# Patient Record
Sex: MALE | Race: WHITE | NOT HISPANIC OR LATINO | Employment: UNEMPLOYED | URBAN - METROPOLITAN AREA
[De-identification: names, ages, dates, MRNs, and addresses within clinical notes are randomized per-mention and may not be internally consistent; named-entity substitution may affect disease eponyms.]

---

## 2017-01-01 ENCOUNTER — APPOINTMENT (OUTPATIENT)
Dept: GENERAL RADIOLOGY | Facility: HOSPITAL | Age: 67
End: 2017-01-01

## 2017-01-01 ENCOUNTER — APPOINTMENT (OUTPATIENT)
Dept: CT IMAGING | Facility: HOSPITAL | Age: 67
End: 2017-01-01

## 2017-01-01 ENCOUNTER — HOSPITAL ENCOUNTER (INPATIENT)
Facility: HOSPITAL | Age: 67
LOS: 10 days | End: 2017-10-18
Attending: EMERGENCY MEDICINE | Admitting: INTERNAL MEDICINE

## 2017-01-01 ENCOUNTER — APPOINTMENT (OUTPATIENT)
Dept: CARDIOLOGY | Facility: HOSPITAL | Age: 67
End: 2017-01-01
Attending: HOSPITALIST

## 2017-01-01 VITALS
HEIGHT: 64 IN | RESPIRATION RATE: 20 BRPM | OXYGEN SATURATION: 86 % | DIASTOLIC BLOOD PRESSURE: 58 MMHG | TEMPERATURE: 98.1 F | SYSTOLIC BLOOD PRESSURE: 135 MMHG | HEART RATE: 120 BPM | BODY MASS INDEX: 53.78 KG/M2 | WEIGHT: 315 LBS

## 2017-01-01 DIAGNOSIS — A41.9 SEPSIS, DUE TO UNSPECIFIED ORGANISM: ICD-10-CM

## 2017-01-01 DIAGNOSIS — Z74.09 IMPAIRED FUNCTIONAL MOBILITY, BALANCE, GAIT, AND ENDURANCE: ICD-10-CM

## 2017-01-01 DIAGNOSIS — E87.20 LACTIC ACIDOSIS: ICD-10-CM

## 2017-01-01 DIAGNOSIS — R13.10 DYSPHAGIA, UNSPECIFIED TYPE: Primary | ICD-10-CM

## 2017-01-01 LAB
ALBUMIN SERPL-MCNC: 2.4 G/DL (ref 3.2–4.8)
ALBUMIN SERPL-MCNC: 2.5 G/DL (ref 3.2–4.8)
ALBUMIN SERPL-MCNC: 2.6 G/DL (ref 3.2–4.8)
ALBUMIN SERPL-MCNC: 2.7 G/DL (ref 3.2–4.8)
ALBUMIN SERPL-MCNC: 2.8 G/DL (ref 3.2–4.8)
ALBUMIN SERPL-MCNC: 3 G/DL (ref 3.2–4.8)
ALBUMIN SERPL-MCNC: 3.1 G/DL (ref 3.2–4.8)
ALBUMIN/GLOB SERPL: 0.8 G/DL (ref 1.5–2.5)
ALBUMIN/GLOB SERPL: 0.9 G/DL (ref 1.5–2.5)
ALBUMIN/GLOB SERPL: 0.9 G/DL (ref 1.5–2.5)
ALP SERPL-CCNC: 142 U/L (ref 25–100)
ALP SERPL-CCNC: 160 U/L (ref 25–100)
ALP SERPL-CCNC: 162 U/L (ref 25–100)
ALP SERPL-CCNC: 176 U/L (ref 25–100)
ALP SERPL-CCNC: 181 U/L (ref 25–100)
ALP SERPL-CCNC: 188 U/L (ref 25–100)
ALP SERPL-CCNC: 249 U/L (ref 25–100)
ALP SERPL-CCNC: 254 U/L (ref 25–100)
ALP SERPL-CCNC: 254 U/L (ref 25–100)
ALP SERPL-CCNC: 271 U/L (ref 25–100)
ALP SERPL-CCNC: 292 U/L (ref 25–100)
ALT SERPL W P-5'-P-CCNC: 32 U/L (ref 7–40)
ALT SERPL W P-5'-P-CCNC: 32 U/L (ref 7–40)
ALT SERPL W P-5'-P-CCNC: 33 U/L (ref 7–40)
ALT SERPL W P-5'-P-CCNC: 34 U/L (ref 7–40)
ALT SERPL W P-5'-P-CCNC: 34 U/L (ref 7–40)
ALT SERPL W P-5'-P-CCNC: 35 U/L (ref 7–40)
ALT SERPL W P-5'-P-CCNC: 60 U/L (ref 7–40)
ALT SERPL W P-5'-P-CCNC: 68 U/L (ref 7–40)
ALT SERPL W P-5'-P-CCNC: 68 U/L (ref 7–40)
ALT SERPL W P-5'-P-CCNC: 80 U/L (ref 7–40)
ALT SERPL W P-5'-P-CCNC: 91 U/L (ref 7–40)
AMMONIA BLD-SCNC: 115 UMOL/L (ref 19–60)
AMMONIA BLD-SCNC: 141 UMOL/L (ref 19–60)
AMMONIA BLD-SCNC: 31 UMOL/L (ref 19–60)
AMMONIA BLD-SCNC: 38 UMOL/L (ref 19–60)
AMMONIA BLD-SCNC: 47 UMOL/L (ref 19–60)
AMMONIA BLD-SCNC: 64 UMOL/L (ref 19–60)
AMPHET+METHAMPHET UR QL: NEGATIVE
AMPHETAMINES UR QL: NEGATIVE
ANION GAP SERPL CALCULATED.3IONS-SCNC: 1 MMOL/L (ref 3–11)
ANION GAP SERPL CALCULATED.3IONS-SCNC: 12 MMOL/L (ref 3–11)
ANION GAP SERPL CALCULATED.3IONS-SCNC: 13 MMOL/L (ref 3–11)
ANION GAP SERPL CALCULATED.3IONS-SCNC: 2 MMOL/L (ref 3–11)
ANION GAP SERPL CALCULATED.3IONS-SCNC: 20 MMOL/L (ref 3–11)
ANION GAP SERPL CALCULATED.3IONS-SCNC: 3 MMOL/L (ref 3–11)
ANION GAP SERPL CALCULATED.3IONS-SCNC: 4 MMOL/L (ref 3–11)
ANION GAP SERPL CALCULATED.3IONS-SCNC: 4 MMOL/L (ref 3–11)
ANION GAP SERPL CALCULATED.3IONS-SCNC: 5 MMOL/L (ref 3–11)
ANION GAP SERPL CALCULATED.3IONS-SCNC: 5 MMOL/L (ref 3–11)
ANION GAP SERPL CALCULATED.3IONS-SCNC: 6 MMOL/L (ref 3–11)
ANION GAP SERPL CALCULATED.3IONS-SCNC: 6 MMOL/L (ref 3–11)
ANION GAP SERPL CALCULATED.3IONS-SCNC: 7 MMOL/L (ref 3–11)
ARTERIAL PATENCY WRIST A: ABNORMAL
ARTERIAL PATENCY WRIST A: ABNORMAL
AST SERPL-CCNC: 112 U/L (ref 0–33)
AST SERPL-CCNC: 121 U/L (ref 0–33)
AST SERPL-CCNC: 127 U/L (ref 0–33)
AST SERPL-CCNC: 127 U/L (ref 0–33)
AST SERPL-CCNC: 143 U/L (ref 0–33)
AST SERPL-CCNC: 144 U/L (ref 0–33)
AST SERPL-CCNC: 186 U/L (ref 0–33)
AST SERPL-CCNC: 202 U/L (ref 0–33)
AST SERPL-CCNC: 202 U/L (ref 0–33)
AST SERPL-CCNC: 219 U/L (ref 0–33)
AST SERPL-CCNC: 228 U/L (ref 0–33)
ATMOSPHERIC PRESS: ABNORMAL MMHG
ATMOSPHERIC PRESS: ABNORMAL MMHG
BACTERIA SPEC AEROBE CULT: ABNORMAL
BACTERIA SPEC AEROBE CULT: ABNORMAL
BACTERIA SPEC AEROBE CULT: NORMAL
BACTERIA UR QL AUTO: ABNORMAL /HPF
BACTERIA UR QL AUTO: ABNORMAL /HPF
BARBITURATES UR QL SCN: NEGATIVE
BASE EXCESS BLDA CALC-SCNC: 0.9 MMOL/L (ref 0–2)
BASE EXCESS BLDA CALC-SCNC: 7.7 MMOL/L (ref 0–2)
BASOPHILS # BLD AUTO: 0.01 10*3/MM3 (ref 0–0.2)
BASOPHILS # BLD AUTO: 0.08 10*3/MM3 (ref 0–0.2)
BASOPHILS # BLD AUTO: 0.17 10*3/MM3 (ref 0–0.2)
BASOPHILS NFR BLD AUTO: 0.1 % (ref 0–1)
BASOPHILS NFR BLD AUTO: 0.6 % (ref 0–1)
BASOPHILS NFR BLD AUTO: 0.7 % (ref 0–1)
BDY SITE: ABNORMAL
BDY SITE: ABNORMAL
BENZODIAZ UR QL SCN: NEGATIVE
BH CV ECHO MEAS - AO ROOT AREA (BSA CORRECTED): 1
BH CV ECHO MEAS - AO ROOT AREA: 5 CM^2
BH CV ECHO MEAS - AO ROOT DIAM: 2.5 CM
BH CV ECHO MEAS - BSA(HAYCOCK): 2.7 M^2
BH CV ECHO MEAS - BSA: 2.4 M^2
BH CV ECHO MEAS - BZI_BMI: 56.8 KILOGRAMS/M^2
BH CV ECHO MEAS - BZI_METRIC_HEIGHT: 162.6 CM
BH CV ECHO MEAS - BZI_METRIC_WEIGHT: 150.1 KG
BH CV ECHO MEAS - CONTRAST EF (2CH): 51.3 ML/M^2
BH CV ECHO MEAS - CONTRAST EF 4CH: 65.3 ML/M^2
BH CV ECHO MEAS - EDV(CUBED): 205.5 ML
BH CV ECHO MEAS - EDV(MOD-SP2): 156 ML
BH CV ECHO MEAS - EDV(MOD-SP4): 199 ML
BH CV ECHO MEAS - EDV(TEICH): 173.3 ML
BH CV ECHO MEAS - EF(CUBED): 67 %
BH CV ECHO MEAS - EF(MOD-SP2): 51.3 %
BH CV ECHO MEAS - EF(MOD-SP4): 65.3 %
BH CV ECHO MEAS - EF(TEICH): 57.8 %
BH CV ECHO MEAS - ESV(CUBED): 67.7 ML
BH CV ECHO MEAS - ESV(MOD-SP2): 76 ML
BH CV ECHO MEAS - ESV(MOD-SP4): 69 ML
BH CV ECHO MEAS - ESV(TEICH): 73.2 ML
BH CV ECHO MEAS - FS: 30.9 %
BH CV ECHO MEAS - IVS/LVPW: 0.98
BH CV ECHO MEAS - IVSD: 1.3 CM
BH CV ECHO MEAS - LA DIMENSION: 3.9 CM
BH CV ECHO MEAS - LA/AO: 1.5
BH CV ECHO MEAS - LV DIASTOLIC VOL/BSA (35-75): 82.1 ML/M^2
BH CV ECHO MEAS - LV MASS(C)D: 356.7 GRAMS
BH CV ECHO MEAS - LV MASS(C)DI: 147.2 GRAMS/M^2
BH CV ECHO MEAS - LV SYSTOLIC VOL/BSA (12-30): 28.5 ML/M^2
BH CV ECHO MEAS - LVIDD: 5.9 CM
BH CV ECHO MEAS - LVIDS: 4.1 CM
BH CV ECHO MEAS - LVLD AP2: 8.3 CM
BH CV ECHO MEAS - LVLD AP4: 9.2 CM
BH CV ECHO MEAS - LVLS AP2: 6.5 CM
BH CV ECHO MEAS - LVLS AP4: 7.5 CM
BH CV ECHO MEAS - LVPWD: 1.4 CM
BH CV ECHO MEAS - SI(CUBED): 56.8 ML/M^2
BH CV ECHO MEAS - SI(MOD-SP2): 33 ML/M^2
BH CV ECHO MEAS - SI(MOD-SP4): 53.7 ML/M^2
BH CV ECHO MEAS - SI(TEICH): 41.3 ML/M^2
BH CV ECHO MEAS - SV(CUBED): 137.7 ML
BH CV ECHO MEAS - SV(MOD-SP2): 80 ML
BH CV ECHO MEAS - SV(MOD-SP4): 130 ML
BH CV ECHO MEAS - SV(TEICH): 100.1 ML
BILIRUB CONJ SERPL-MCNC: 2.1 MG/DL (ref 0–0.2)
BILIRUB CONJ SERPL-MCNC: 2.3 MG/DL (ref 0–0.2)
BILIRUB CONJ SERPL-MCNC: 2.3 MG/DL (ref 0–0.2)
BILIRUB INDIRECT SERPL-MCNC: 0.7 MG/DL (ref 0.1–1.1)
BILIRUB SERPL-MCNC: 2.8 MG/DL (ref 0.3–1.2)
BILIRUB SERPL-MCNC: 3 MG/DL (ref 0.3–1.2)
BILIRUB SERPL-MCNC: 3.5 MG/DL (ref 0.3–1.2)
BILIRUB SERPL-MCNC: 3.5 MG/DL (ref 0.3–1.2)
BILIRUB SERPL-MCNC: 3.8 MG/DL (ref 0.3–1.2)
BILIRUB SERPL-MCNC: 5.4 MG/DL (ref 0.3–1.2)
BILIRUB SERPL-MCNC: 5.6 MG/DL (ref 0.3–1.2)
BILIRUB SERPL-MCNC: 6 MG/DL (ref 0.3–1.2)
BILIRUB SERPL-MCNC: 7.2 MG/DL (ref 0.3–1.2)
BILIRUB UR QL STRIP: ABNORMAL
BILIRUB UR QL STRIP: ABNORMAL
BUN BLD-MCNC: 11 MG/DL (ref 9–23)
BUN BLD-MCNC: 12 MG/DL (ref 9–23)
BUN BLD-MCNC: 12 MG/DL (ref 9–23)
BUN BLD-MCNC: 13 MG/DL (ref 9–23)
BUN BLD-MCNC: 14 MG/DL (ref 9–23)
BUN BLD-MCNC: 23 MG/DL (ref 9–23)
BUN BLD-MCNC: 26 MG/DL (ref 9–23)
BUN BLD-MCNC: 27 MG/DL (ref 9–23)
BUN BLD-MCNC: 41 MG/DL (ref 9–23)
BUN BLD-MCNC: 43 MG/DL (ref 9–23)
BUN BLD-MCNC: 45 MG/DL (ref 9–23)
BUN/CREAT SERPL: 17.1 (ref 7–25)
BUN/CREAT SERPL: 20 (ref 7–25)
BUN/CREAT SERPL: 21.7 (ref 7–25)
BUN/CREAT SERPL: 23.3 (ref 7–25)
BUN/CREAT SERPL: 27.5 (ref 7–25)
BUN/CREAT SERPL: 28 (ref 7–25)
BUN/CREAT SERPL: 41 (ref 7–25)
BUN/CREAT SERPL: 43.3 (ref 7–25)
BUN/CREAT SERPL: 46 (ref 7–25)
BUN/CREAT SERPL: 56.3 (ref 7–25)
BUN/CREAT SERPL: 61.4 (ref 7–25)
BUPRENORPHINE SERPL-MCNC: NEGATIVE NG/ML
CA-I SERPL ISE-MCNC: 1.08 MMOL/L (ref 1.12–1.32)
CA-I SERPL ISE-MCNC: 1.2 MMOL/L (ref 1.12–1.32)
CALCIUM SPEC-SCNC: 7.9 MG/DL (ref 8.7–10.4)
CALCIUM SPEC-SCNC: 8.1 MG/DL (ref 8.7–10.4)
CALCIUM SPEC-SCNC: 8.2 MG/DL (ref 8.7–10.4)
CALCIUM SPEC-SCNC: 8.3 MG/DL (ref 8.7–10.4)
CALCIUM SPEC-SCNC: 8.4 MG/DL (ref 8.7–10.4)
CALCIUM SPEC-SCNC: 8.4 MG/DL (ref 8.7–10.4)
CALCIUM SPEC-SCNC: 8.5 MG/DL (ref 8.7–10.4)
CALCIUM SPEC-SCNC: 8.7 MG/DL (ref 8.7–10.4)
CALCIUM SPEC-SCNC: 8.8 MG/DL (ref 8.7–10.4)
CALCIUM SPEC-SCNC: 8.9 MG/DL (ref 8.7–10.4)
CALCIUM SPEC-SCNC: 9.2 MG/DL (ref 8.7–10.4)
CANNABINOIDS SERPL QL: NEGATIVE
CHLORIDE SERPL-SCNC: 103 MMOL/L (ref 99–109)
CHLORIDE SERPL-SCNC: 103 MMOL/L (ref 99–109)
CHLORIDE SERPL-SCNC: 104 MMOL/L (ref 99–109)
CHLORIDE SERPL-SCNC: 105 MMOL/L (ref 99–109)
CHLORIDE SERPL-SCNC: 107 MMOL/L (ref 99–109)
CHLORIDE SERPL-SCNC: 108 MMOL/L (ref 99–109)
CHLORIDE SERPL-SCNC: 84 MMOL/L (ref 99–109)
CHLORIDE SERPL-SCNC: 94 MMOL/L (ref 99–109)
CHLORIDE SERPL-SCNC: 97 MMOL/L (ref 99–109)
CHOLEST SERPL-MCNC: 72 MG/DL (ref 0–200)
CHOLEST SERPL-MCNC: 81 MG/DL (ref 0–200)
CK SERPL-CCNC: 259 U/L (ref 26–174)
CLARITY UR: ABNORMAL
CLARITY UR: ABNORMAL
CO2 BLDA-SCNC: 26.1 MMOL/L (ref 22–33)
CO2 BLDA-SCNC: 34.6 MMOL/L (ref 22–33)
CO2 SERPL-SCNC: 24 MMOL/L (ref 20–31)
CO2 SERPL-SCNC: 28 MMOL/L (ref 20–31)
CO2 SERPL-SCNC: 29 MMOL/L (ref 20–31)
CO2 SERPL-SCNC: 31 MMOL/L (ref 20–31)
CO2 SERPL-SCNC: 35 MMOL/L (ref 20–31)
CO2 SERPL-SCNC: 35 MMOL/L (ref 20–31)
CO2 SERPL-SCNC: 36 MMOL/L (ref 20–31)
CO2 SERPL-SCNC: 37 MMOL/L (ref 20–31)
CO2 SERPL-SCNC: 37 MMOL/L (ref 20–31)
CO2 SERPL-SCNC: 38 MMOL/L (ref 20–31)
CO2 SERPL-SCNC: 38 MMOL/L (ref 20–31)
CO2 SERPL-SCNC: 39 MMOL/L (ref 20–31)
COCAINE UR QL: NEGATIVE
COHGB MFR BLD: 1.4 % (ref 0–2)
COHGB MFR BLD: 1.7 % (ref 0–2)
COLOR UR: ABNORMAL
COLOR UR: ABNORMAL
CREAT BLD-MCNC: 0.4 MG/DL (ref 0.6–1.3)
CREAT BLD-MCNC: 0.5 MG/DL (ref 0.6–1.3)
CREAT BLD-MCNC: 0.5 MG/DL (ref 0.6–1.3)
CREAT BLD-MCNC: 0.6 MG/DL (ref 0.6–1.3)
CREAT BLD-MCNC: 0.7 MG/DL (ref 0.6–1.3)
CREAT BLD-MCNC: 0.8 MG/DL (ref 0.6–1.3)
CREAT BLD-MCNC: 1 MG/DL (ref 0.6–1.3)
CREAT UR-MCNC: 167.4 MG/DL
CRP SERPL-MCNC: 14.83 MG/DL (ref 0–1)
CRP SERPL-MCNC: 15.29 MG/DL (ref 0–1)
D-LACTATE SERPL-SCNC: 1.1 MMOL/L (ref 0.5–2)
D-LACTATE SERPL-SCNC: 1.5 MMOL/L (ref 0.5–2)
D-LACTATE SERPL-SCNC: 10.6 MMOL/L (ref 0.5–2)
D-LACTATE SERPL-SCNC: 3.4 MMOL/L (ref 0.5–2)
DEPRECATED RDW RBC AUTO: 56.9 FL (ref 37–54)
DEPRECATED RDW RBC AUTO: 57.9 FL (ref 37–54)
DEPRECATED RDW RBC AUTO: 59 FL (ref 37–54)
DEPRECATED RDW RBC AUTO: 60.9 FL (ref 37–54)
DEPRECATED RDW RBC AUTO: 66.3 FL (ref 37–54)
DEPRECATED RDW RBC AUTO: 67 FL (ref 37–54)
DEPRECATED RDW RBC AUTO: 67.4 FL (ref 37–54)
DEPRECATED RDW RBC AUTO: 68.2 FL (ref 37–54)
DEPRECATED RDW RBC AUTO: 68.5 FL (ref 37–54)
EOSINOPHIL # BLD AUTO: 0.03 10*3/MM3 (ref 0–0.3)
EOSINOPHIL # BLD AUTO: 0.24 10*3/MM3 (ref 0–0.3)
EOSINOPHIL # BLD AUTO: 0.42 10*3/MM3 (ref 0–0.3)
EOSINOPHIL NFR BLD AUTO: 0.3 % (ref 0–3)
EOSINOPHIL NFR BLD AUTO: 1.8 % (ref 0–3)
EOSINOPHIL NFR BLD AUTO: 1.8 % (ref 0–3)
ERYTHROCYTE [DISTWIDTH] IN BLOOD BY AUTOMATED COUNT: 15.6 % (ref 11.3–14.5)
ERYTHROCYTE [DISTWIDTH] IN BLOOD BY AUTOMATED COUNT: 15.7 % (ref 11.3–14.5)
ERYTHROCYTE [DISTWIDTH] IN BLOOD BY AUTOMATED COUNT: 15.8 % (ref 11.3–14.5)
ERYTHROCYTE [DISTWIDTH] IN BLOOD BY AUTOMATED COUNT: 16.1 % (ref 11.3–14.5)
ERYTHROCYTE [DISTWIDTH] IN BLOOD BY AUTOMATED COUNT: 16.6 % (ref 11.3–14.5)
ERYTHROCYTE [DISTWIDTH] IN BLOOD BY AUTOMATED COUNT: 16.8 % (ref 11.3–14.5)
ERYTHROCYTE [DISTWIDTH] IN BLOOD BY AUTOMATED COUNT: 16.8 % (ref 11.3–14.5)
ERYTHROCYTE [DISTWIDTH] IN BLOOD BY AUTOMATED COUNT: 17.1 % (ref 11.3–14.5)
ERYTHROCYTE [DISTWIDTH] IN BLOOD BY AUTOMATED COUNT: 17.2 % (ref 11.3–14.5)
GFR SERPL CREATININE-BSD FRML MDRD: 113 ML/MIN/1.73
GFR SERPL CREATININE-BSD FRML MDRD: 113 ML/MIN/1.73
GFR SERPL CREATININE-BSD FRML MDRD: 135 ML/MIN/1.73
GFR SERPL CREATININE-BSD FRML MDRD: 75 ML/MIN/1.73
GFR SERPL CREATININE-BSD FRML MDRD: 97 ML/MIN/1.73
GFR SERPL CREATININE-BSD FRML MDRD: >150 ML/MIN/1.73
GLOBULIN UR ELPH-MCNC: 2.9 GM/DL
GLOBULIN UR ELPH-MCNC: 2.9 GM/DL
GLOBULIN UR ELPH-MCNC: 3.1 GM/DL
GLOBULIN UR ELPH-MCNC: 3.2 GM/DL
GLOBULIN UR ELPH-MCNC: 3.2 GM/DL
GLOBULIN UR ELPH-MCNC: 3.5 GM/DL
GLUCOSE BLD-MCNC: 107 MG/DL (ref 70–100)
GLUCOSE BLD-MCNC: 109 MG/DL (ref 70–100)
GLUCOSE BLD-MCNC: 114 MG/DL (ref 70–100)
GLUCOSE BLD-MCNC: 118 MG/DL (ref 70–100)
GLUCOSE BLD-MCNC: 126 MG/DL (ref 70–100)
GLUCOSE BLD-MCNC: 135 MG/DL (ref 70–100)
GLUCOSE BLD-MCNC: 138 MG/DL (ref 70–100)
GLUCOSE BLD-MCNC: 141 MG/DL (ref 70–100)
GLUCOSE BLD-MCNC: 144 MG/DL (ref 70–100)
GLUCOSE BLD-MCNC: 145 MG/DL (ref 70–100)
GLUCOSE BLD-MCNC: 145 MG/DL (ref 70–100)
GLUCOSE BLD-MCNC: 149 MG/DL (ref 70–100)
GLUCOSE BLD-MCNC: 175 MG/DL (ref 70–100)
GLUCOSE BLDC GLUCOMTR-MCNC: 102 MG/DL (ref 70–130)
GLUCOSE BLDC GLUCOMTR-MCNC: 107 MG/DL (ref 70–130)
GLUCOSE BLDC GLUCOMTR-MCNC: 108 MG/DL (ref 70–130)
GLUCOSE BLDC GLUCOMTR-MCNC: 111 MG/DL (ref 70–130)
GLUCOSE BLDC GLUCOMTR-MCNC: 114 MG/DL (ref 70–130)
GLUCOSE BLDC GLUCOMTR-MCNC: 115 MG/DL (ref 70–130)
GLUCOSE BLDC GLUCOMTR-MCNC: 116 MG/DL (ref 70–130)
GLUCOSE BLDC GLUCOMTR-MCNC: 117 MG/DL (ref 70–130)
GLUCOSE BLDC GLUCOMTR-MCNC: 118 MG/DL (ref 70–130)
GLUCOSE BLDC GLUCOMTR-MCNC: 124 MG/DL (ref 70–130)
GLUCOSE BLDC GLUCOMTR-MCNC: 125 MG/DL (ref 70–130)
GLUCOSE BLDC GLUCOMTR-MCNC: 125 MG/DL (ref 70–130)
GLUCOSE BLDC GLUCOMTR-MCNC: 128 MG/DL (ref 70–130)
GLUCOSE BLDC GLUCOMTR-MCNC: 128 MG/DL (ref 70–130)
GLUCOSE BLDC GLUCOMTR-MCNC: 129 MG/DL (ref 70–130)
GLUCOSE BLDC GLUCOMTR-MCNC: 133 MG/DL (ref 70–130)
GLUCOSE BLDC GLUCOMTR-MCNC: 133 MG/DL (ref 70–130)
GLUCOSE BLDC GLUCOMTR-MCNC: 145 MG/DL (ref 70–130)
GLUCOSE BLDC GLUCOMTR-MCNC: 171 MG/DL (ref 70–130)
GLUCOSE BLDC GLUCOMTR-MCNC: 182 MG/DL (ref 70–130)
GLUCOSE BLDC GLUCOMTR-MCNC: 74 MG/DL (ref 70–130)
GLUCOSE BLDC GLUCOMTR-MCNC: 86 MG/DL (ref 70–130)
GLUCOSE BLDC GLUCOMTR-MCNC: 93 MG/DL (ref 70–130)
GLUCOSE BLDC GLUCOMTR-MCNC: 96 MG/DL (ref 70–130)
GLUCOSE BLDC GLUCOMTR-MCNC: 97 MG/DL (ref 70–130)
GLUCOSE BLDC GLUCOMTR-MCNC: 97 MG/DL (ref 70–130)
GLUCOSE UR STRIP-MCNC: NEGATIVE MG/DL
GLUCOSE UR STRIP-MCNC: NEGATIVE MG/DL
HCO3 BLDA-SCNC: 25 MMOL/L (ref 20–26)
HCO3 BLDA-SCNC: 33.1 MMOL/L (ref 20–26)
HCT VFR BLD AUTO: 36.2 % (ref 38.9–50.9)
HCT VFR BLD AUTO: 37.3 % (ref 38.9–50.9)
HCT VFR BLD AUTO: 37.9 % (ref 38.9–50.9)
HCT VFR BLD AUTO: 38.4 % (ref 38.9–50.9)
HCT VFR BLD AUTO: 38.7 % (ref 38.9–50.9)
HCT VFR BLD AUTO: 38.8 % (ref 38.9–50.9)
HCT VFR BLD AUTO: 39.1 % (ref 38.9–50.9)
HCT VFR BLD AUTO: 40.1 % (ref 38.9–50.9)
HCT VFR BLD AUTO: 41 % (ref 38.9–50.9)
HCT VFR BLD CALC: 38.9 %
HCT VFR BLD CALC: 45.8 %
HGB BLD-MCNC: 12.5 G/DL (ref 13.1–17.5)
HGB BLD-MCNC: 12.7 G/DL (ref 13.1–17.5)
HGB BLD-MCNC: 12.9 G/DL (ref 13.1–17.5)
HGB BLD-MCNC: 13 G/DL (ref 13.1–17.5)
HGB BLD-MCNC: 13.8 G/DL (ref 13.1–17.5)
HGB BLD-MCNC: 13.8 G/DL (ref 13.1–17.5)
HGB BLD-MCNC: 13.9 G/DL (ref 13.1–17.5)
HGB BLDA-MCNC: 12.7 G/DL (ref 13.5–17.5)
HGB BLDA-MCNC: 15 G/DL (ref 13.5–17.5)
HGB UR QL STRIP.AUTO: ABNORMAL
HGB UR QL STRIP.AUTO: ABNORMAL
HOLD SPECIMEN: NORMAL
HOROWITZ INDEX BLD+IHG-RTO: 32 %
HOROWITZ INDEX BLD+IHG-RTO: 44 %
HYALINE CASTS UR QL AUTO: ABNORMAL /LPF
HYALINE CASTS UR QL AUTO: ABNORMAL /LPF
IMM GRANULOCYTES # BLD: 0.03 10*3/MM3 (ref 0–0.03)
IMM GRANULOCYTES # BLD: 0.15 10*3/MM3 (ref 0–0.03)
IMM GRANULOCYTES # BLD: 0.26 10*3/MM3 (ref 0–0.03)
IMM GRANULOCYTES NFR BLD: 0.3 % (ref 0–0.6)
IMM GRANULOCYTES NFR BLD: 1.1 % (ref 0–0.6)
IMM GRANULOCYTES NFR BLD: 1.1 % (ref 0–0.6)
INR PPP: 1.27
INR PPP: 1.28
INR PPP: 1.63
INR PPP: 1.78
KETONES UR QL STRIP: ABNORMAL
KETONES UR QL STRIP: ABNORMAL
LEUKOCYTE ESTERASE UR QL STRIP.AUTO: ABNORMAL
LEUKOCYTE ESTERASE UR QL STRIP.AUTO: ABNORMAL
LIPASE SERPL-CCNC: 141 U/L (ref 6–51)
LIPASE SERPL-CCNC: 247 U/L (ref 6–51)
LV EF 2D ECHO EST: 75 %
LYMPHOCYTES # BLD AUTO: 0.29 10*3/MM3 (ref 0.6–4.8)
LYMPHOCYTES # BLD AUTO: 0.94 10*3/MM3 (ref 0.6–4.8)
LYMPHOCYTES # BLD AUTO: 1.96 10*3/MM3 (ref 0.6–4.8)
LYMPHOCYTES NFR BLD AUTO: 3.3 % (ref 24–44)
LYMPHOCYTES NFR BLD AUTO: 7 % (ref 24–44)
LYMPHOCYTES NFR BLD AUTO: 8.3 % (ref 24–44)
MAGNESIUM SERPL-MCNC: 1.8 MG/DL (ref 1.3–2.7)
MAGNESIUM SERPL-MCNC: 2 MG/DL (ref 1.3–2.7)
MAGNESIUM SERPL-MCNC: 2 MG/DL (ref 1.3–2.7)
MAGNESIUM SERPL-MCNC: 2.1 MG/DL (ref 1.3–2.7)
MAGNESIUM SERPL-MCNC: 2.3 MG/DL (ref 1.3–2.7)
MAGNESIUM SERPL-MCNC: 2.5 MG/DL (ref 1.3–2.7)
MCH RBC QN AUTO: 35.9 PG (ref 27–31)
MCH RBC QN AUTO: 36 PG (ref 27–31)
MCH RBC QN AUTO: 36.3 PG (ref 27–31)
MCH RBC QN AUTO: 36.4 PG (ref 27–31)
MCH RBC QN AUTO: 36.6 PG (ref 27–31)
MCH RBC QN AUTO: 37 PG (ref 27–31)
MCH RBC QN AUTO: 37.1 PG (ref 27–31)
MCH RBC QN AUTO: 37.1 PG (ref 27–31)
MCH RBC QN AUTO: 37.2 PG (ref 27–31)
MCHC RBC AUTO-ENTMCNC: 32.4 G/DL (ref 32–36)
MCHC RBC AUTO-ENTMCNC: 32.6 G/DL (ref 32–36)
MCHC RBC AUTO-ENTMCNC: 33.5 G/DL (ref 32–36)
MCHC RBC AUTO-ENTMCNC: 33.7 G/DL (ref 32–36)
MCHC RBC AUTO-ENTMCNC: 34.3 G/DL (ref 32–36)
MCHC RBC AUTO-ENTMCNC: 34.6 G/DL (ref 32–36)
MCHC RBC AUTO-ENTMCNC: 35.1 G/DL (ref 32–36)
MCHC RBC AUTO-ENTMCNC: 35.5 G/DL (ref 32–36)
MCHC RBC AUTO-ENTMCNC: 35.7 G/DL (ref 32–36)
MCV RBC AUTO: 102.5 FL (ref 80–99)
MCV RBC AUTO: 102.7 FL (ref 80–99)
MCV RBC AUTO: 104.5 FL (ref 80–99)
MCV RBC AUTO: 105.4 FL (ref 80–99)
MCV RBC AUTO: 108.3 FL (ref 80–99)
MCV RBC AUTO: 109.9 FL (ref 80–99)
MCV RBC AUTO: 110.3 FL (ref 80–99)
MCV RBC AUTO: 110.9 FL (ref 80–99)
MCV RBC AUTO: 112 FL (ref 80–99)
METHADONE UR QL SCN: NEGATIVE
METHGB BLD QL: 0.6 % (ref 0–1.5)
METHGB BLD QL: 1.2 % (ref 0–1.5)
MODALITY: ABNORMAL
MODALITY: ABNORMAL
MONOCYTES # BLD AUTO: 0.27 10*3/MM3 (ref 0–1)
MONOCYTES # BLD AUTO: 1.05 10*3/MM3 (ref 0–1)
MONOCYTES # BLD AUTO: 1.68 10*3/MM3 (ref 0–1)
MONOCYTES NFR BLD AUTO: 3.1 % (ref 0–12)
MONOCYTES NFR BLD AUTO: 7.1 % (ref 0–12)
MONOCYTES NFR BLD AUTO: 7.9 % (ref 0–12)
NEUTROPHILS # BLD AUTO: 10.89 10*3/MM3 (ref 1.5–8.3)
NEUTROPHILS # BLD AUTO: 19.25 10*3/MM3 (ref 1.5–8.3)
NEUTROPHILS # BLD AUTO: 8.19 10*3/MM3 (ref 1.5–8.3)
NEUTROPHILS NFR BLD AUTO: 81 % (ref 41–71)
NEUTROPHILS NFR BLD AUTO: 81.6 % (ref 41–71)
NEUTROPHILS NFR BLD AUTO: 92.9 % (ref 41–71)
NITRITE UR QL STRIP: POSITIVE
NITRITE UR QL STRIP: POSITIVE
OPIATES UR QL: NEGATIVE
OSMOLALITY SERPL: 301 MOSM/KG (ref 275–295)
OSMOLALITY UR: 301 MOSM/KG (ref 300–1100)
OXYCODONE UR QL SCN: NEGATIVE
OXYHGB MFR BLDV: 89.2 % (ref 94–99)
OXYHGB MFR BLDV: 93.8 % (ref 94–99)
PCO2 BLDA: 37.4 MM HG (ref 35–48)
PCO2 BLDA: 48.9 MM HG (ref 35–48)
PCP UR QL SCN: NEGATIVE
PH BLDA: 7.43 PH UNITS (ref 7.35–7.45)
PH BLDA: 7.44 PH UNITS (ref 7.35–7.45)
PH UR STRIP.AUTO: 6 [PH] (ref 5–8)
PH UR STRIP.AUTO: 6 [PH] (ref 5–8)
PHOSPHATE SERPL-MCNC: 1.2 MG/DL (ref 2.4–5.1)
PHOSPHATE SERPL-MCNC: 1.4 MG/DL (ref 2.4–5.1)
PHOSPHATE SERPL-MCNC: 1.4 MG/DL (ref 2.4–5.1)
PHOSPHATE SERPL-MCNC: 1.6 MG/DL (ref 2.4–5.1)
PHOSPHATE SERPL-MCNC: 1.6 MG/DL (ref 2.4–5.1)
PHOSPHATE SERPL-MCNC: 1.9 MG/DL (ref 2.4–5.1)
PHOSPHATE SERPL-MCNC: 1.9 MG/DL (ref 2.4–5.1)
PHOSPHATE SERPL-MCNC: 2 MG/DL (ref 2.4–5.1)
PHOSPHATE SERPL-MCNC: 2.1 MG/DL (ref 2.4–5.1)
PHOSPHATE SERPL-MCNC: 2.2 MG/DL (ref 2.4–5.1)
PHOSPHATE SERPL-MCNC: 2.4 MG/DL (ref 2.4–5.1)
PHOSPHATE SERPL-MCNC: 2.5 MG/DL (ref 2.4–5.1)
PHOSPHATE SERPL-MCNC: 2.5 MG/DL (ref 2.4–5.1)
PHOSPHATE SERPL-MCNC: 2.6 MG/DL (ref 2.4–5.1)
PHOSPHATE SERPL-MCNC: 2.7 MG/DL (ref 2.4–5.1)
PHOSPHATE SERPL-MCNC: 2.7 MG/DL (ref 2.4–5.1)
PHOSPHATE SERPL-MCNC: 4.9 MG/DL (ref 2.4–5.1)
PLATELET # BLD AUTO: 108 10*3/MM3 (ref 150–450)
PLATELET # BLD AUTO: 108 10*3/MM3 (ref 150–450)
PLATELET # BLD AUTO: 159 10*3/MM3 (ref 150–450)
PLATELET # BLD AUTO: 66 10*3/MM3 (ref 150–450)
PLATELET # BLD AUTO: 68 10*3/MM3 (ref 150–450)
PLATELET # BLD AUTO: 87 10*3/MM3 (ref 150–450)
PLATELET # BLD AUTO: 89 10*3/MM3 (ref 150–450)
PLATELET # BLD AUTO: 94 10*3/MM3 (ref 150–450)
PLATELET # BLD AUTO: 96 10*3/MM3 (ref 150–450)
PMV BLD AUTO: 10.8 FL (ref 6–12)
PMV BLD AUTO: 11.4 FL (ref 6–12)
PMV BLD AUTO: 11.4 FL (ref 6–12)
PMV BLD AUTO: 12 FL (ref 6–12)
PMV BLD AUTO: 12.2 FL (ref 6–12)
PMV BLD AUTO: 12.2 FL (ref 6–12)
PMV BLD AUTO: 12.3 FL (ref 6–12)
PMV BLD AUTO: 12.9 FL (ref 6–12)
PMV BLD AUTO: 13 FL (ref 6–12)
PO2 BLDA: 62.3 MM HG (ref 83–108)
PO2 BLDA: 85 MM HG (ref 83–108)
POTASSIUM BLD-SCNC: 2.9 MMOL/L (ref 3.5–5.5)
POTASSIUM BLD-SCNC: 2.9 MMOL/L (ref 3.5–5.5)
POTASSIUM BLD-SCNC: 3 MMOL/L (ref 3.5–5.5)
POTASSIUM BLD-SCNC: 3.1 MMOL/L (ref 3.5–5.5)
POTASSIUM BLD-SCNC: 3.4 MMOL/L (ref 3.5–5.5)
POTASSIUM BLD-SCNC: 3.6 MMOL/L (ref 3.5–5.5)
POTASSIUM BLD-SCNC: 3.7 MMOL/L (ref 3.5–5.5)
POTASSIUM BLD-SCNC: 3.7 MMOL/L (ref 3.5–5.5)
POTASSIUM BLD-SCNC: 3.8 MMOL/L (ref 3.5–5.5)
POTASSIUM BLD-SCNC: 3.9 MMOL/L (ref 3.5–5.5)
POTASSIUM BLD-SCNC: 3.9 MMOL/L (ref 3.5–5.5)
POTASSIUM BLD-SCNC: 4 MMOL/L (ref 3.5–5.5)
POTASSIUM BLD-SCNC: 4 MMOL/L (ref 3.5–5.5)
POTASSIUM BLD-SCNC: 4.1 MMOL/L (ref 3.5–5.5)
POTASSIUM BLD-SCNC: 4.1 MMOL/L (ref 3.5–5.5)
POTASSIUM BLD-SCNC: 4.2 MMOL/L (ref 3.5–5.5)
POTASSIUM BLD-SCNC: 4.4 MMOL/L (ref 3.5–5.5)
PREALB SERPL-MCNC: <5 MG/DL (ref 10–40)
PREALB SERPL-MCNC: <5 MG/DL (ref 10–40)
PROCALCITONIN SERPL-MCNC: 0.41 NG/ML
PROCALCITONIN SERPL-MCNC: 0.81 NG/ML
PROPOXYPH UR QL: NEGATIVE
PROT SERPL-MCNC: 5.3 G/DL (ref 5.7–8.2)
PROT SERPL-MCNC: 5.5 G/DL (ref 5.7–8.2)
PROT SERPL-MCNC: 5.7 G/DL (ref 5.7–8.2)
PROT SERPL-MCNC: 5.7 G/DL (ref 5.7–8.2)
PROT SERPL-MCNC: 5.8 G/DL (ref 5.7–8.2)
PROT SERPL-MCNC: 5.8 G/DL (ref 5.7–8.2)
PROT SERPL-MCNC: 6 G/DL (ref 5.7–8.2)
PROT SERPL-MCNC: 6 G/DL (ref 5.7–8.2)
PROT SERPL-MCNC: 6.2 G/DL (ref 5.7–8.2)
PROT SERPL-MCNC: 6.6 G/DL (ref 5.7–8.2)
PROT SERPL-MCNC: 7.4 G/DL (ref 5.7–8.2)
PROT UR QL STRIP: ABNORMAL
PROT UR QL STRIP: ABNORMAL
PROTHROMBIN TIME: 14 SECONDS (ref 9.6–11.5)
PROTHROMBIN TIME: 14.1 SECONDS (ref 9.6–11.5)
PROTHROMBIN TIME: 18 SECONDS (ref 9.6–11.5)
PROTHROMBIN TIME: 19.7 SECONDS (ref 9.6–11.5)
RBC # BLD AUTO: 3.48 10*6/MM3 (ref 4.2–5.76)
RBC # BLD AUTO: 3.5 10*6/MM3 (ref 4.2–5.76)
RBC # BLD AUTO: 3.5 10*6/MM3 (ref 4.2–5.76)
RBC # BLD AUTO: 3.53 10*6/MM3 (ref 4.2–5.76)
RBC # BLD AUTO: 3.54 10*6/MM3 (ref 4.2–5.76)
RBC # BLD AUTO: 3.58 10*6/MM3 (ref 4.2–5.76)
RBC # BLD AUTO: 3.73 10*6/MM3 (ref 4.2–5.76)
RBC # BLD AUTO: 3.74 10*6/MM3 (ref 4.2–5.76)
RBC # BLD AUTO: 3.77 10*6/MM3 (ref 4.2–5.76)
RBC # UR: ABNORMAL /HPF
RBC # UR: ABNORMAL /HPF
REF LAB TEST METHOD: ABNORMAL
REF LAB TEST METHOD: ABNORMAL
SODIUM BLD-SCNC: 128 MMOL/L (ref 132–146)
SODIUM BLD-SCNC: 134 MMOL/L (ref 132–146)
SODIUM BLD-SCNC: 141 MMOL/L (ref 132–146)
SODIUM BLD-SCNC: 142 MMOL/L (ref 132–146)
SODIUM BLD-SCNC: 144 MMOL/L (ref 132–146)
SODIUM BLD-SCNC: 145 MMOL/L (ref 132–146)
SODIUM BLD-SCNC: 145 MMOL/L (ref 132–146)
SODIUM BLD-SCNC: 146 MMOL/L (ref 132–146)
SODIUM BLD-SCNC: 147 MMOL/L (ref 132–146)
SODIUM BLD-SCNC: 147 MMOL/L (ref 132–146)
SODIUM BLD-SCNC: 148 MMOL/L (ref 132–146)
SODIUM BLD-SCNC: 149 MMOL/L (ref 132–146)
SODIUM BLD-SCNC: 149 MMOL/L (ref 132–146)
SODIUM BLD-SCNC: 150 MMOL/L (ref 132–146)
SODIUM BLD-SCNC: 150 MMOL/L (ref 132–146)
SODIUM UR-SCNC: 16 MMOL/L (ref 30–90)
SP GR UR STRIP: 1.02 (ref 1–1.03)
SP GR UR STRIP: 1.02 (ref 1–1.03)
SQUAMOUS #/AREA URNS HPF: ABNORMAL /HPF
SQUAMOUS #/AREA URNS HPF: ABNORMAL /HPF
T4 FREE SERPL-MCNC: 0.88 NG/DL (ref 0.89–1.76)
TRICYCLICS UR QL SCN: NEGATIVE
TRIGL SERPL-MCNC: 110 MG/DL (ref 0–150)
TRIGL SERPL-MCNC: 112 MG/DL (ref 0–150)
TROPONIN I SERPL-MCNC: 0.02 NG/ML (ref 0–0.07)
TROPONIN I SERPL-MCNC: 0.04 NG/ML (ref 0–0.07)
TSH SERPL DL<=0.05 MIU/L-ACNC: 6.01 MIU/ML (ref 0.35–5.35)
UROBILINOGEN UR QL STRIP: ABNORMAL
UROBILINOGEN UR QL STRIP: ABNORMAL
WBC NRBC COR # BLD: 10 10*3/MM3 (ref 3.5–10.8)
WBC NRBC COR # BLD: 11.3 10*3/MM3 (ref 3.5–10.8)
WBC NRBC COR # BLD: 13.03 10*3/MM3 (ref 3.5–10.8)
WBC NRBC COR # BLD: 13.35 10*3/MM3 (ref 3.5–10.8)
WBC NRBC COR # BLD: 13.52 10*3/MM3 (ref 3.5–10.8)
WBC NRBC COR # BLD: 23.74 10*3/MM3 (ref 3.5–10.8)
WBC NRBC COR # BLD: 7.12 10*3/MM3 (ref 3.5–10.8)
WBC NRBC COR # BLD: 8.82 10*3/MM3 (ref 3.5–10.8)
WBC NRBC COR # BLD: 9.76 10*3/MM3 (ref 3.5–10.8)
WBC UR QL AUTO: ABNORMAL /HPF
WBC UR QL AUTO: ABNORMAL /HPF
WHOLE BLOOD HOLD SPECIMEN: NORMAL
WHOLE BLOOD HOLD SPECIMEN: NORMAL

## 2017-01-01 PROCEDURE — 84100 ASSAY OF PHOSPHORUS: CPT | Performed by: NURSE PRACTITIONER

## 2017-01-01 PROCEDURE — 84478 ASSAY OF TRIGLYCERIDES: CPT

## 2017-01-01 PROCEDURE — 81001 URINALYSIS AUTO W/SCOPE: CPT | Performed by: NURSE PRACTITIONER

## 2017-01-01 PROCEDURE — 82805 BLOOD GASES W/O2 SATURATION: CPT | Performed by: EMERGENCY MEDICINE

## 2017-01-01 PROCEDURE — 84132 ASSAY OF SERUM POTASSIUM: CPT | Performed by: NURSE PRACTITIONER

## 2017-01-01 PROCEDURE — 80076 HEPATIC FUNCTION PANEL: CPT | Performed by: HOSPITALIST

## 2017-01-01 PROCEDURE — 82465 ASSAY BLD/SERUM CHOLESTEROL: CPT

## 2017-01-01 PROCEDURE — 80069 RENAL FUNCTION PANEL: CPT | Performed by: HOSPITALIST

## 2017-01-01 PROCEDURE — 25010000002 HYDROMORPHONE PER 4 MG: Performed by: FAMILY MEDICINE

## 2017-01-01 PROCEDURE — 82140 ASSAY OF AMMONIA: CPT | Performed by: INTERNAL MEDICINE

## 2017-01-01 PROCEDURE — 85025 COMPLETE CBC W/AUTO DIFF WBC: CPT | Performed by: EMERGENCY MEDICINE

## 2017-01-01 PROCEDURE — 83735 ASSAY OF MAGNESIUM: CPT | Performed by: INTERNAL MEDICINE

## 2017-01-01 PROCEDURE — 71010 HC CHEST PA OR AP: CPT

## 2017-01-01 PROCEDURE — 82962 GLUCOSE BLOOD TEST: CPT

## 2017-01-01 PROCEDURE — 82805 BLOOD GASES W/O2 SATURATION: CPT | Performed by: NURSE PRACTITIONER

## 2017-01-01 PROCEDURE — 87077 CULTURE AEROBIC IDENTIFY: CPT | Performed by: EMERGENCY MEDICINE

## 2017-01-01 PROCEDURE — 25010000002 PIPERACILLIN-TAZOBACTAM: Performed by: EMERGENCY MEDICINE

## 2017-01-01 PROCEDURE — 25010000002 FUROSEMIDE PER 20 MG: Performed by: INTERNAL MEDICINE

## 2017-01-01 PROCEDURE — 94799 UNLISTED PULMONARY SVC/PX: CPT

## 2017-01-01 PROCEDURE — 94660 CPAP INITIATION&MGMT: CPT

## 2017-01-01 PROCEDURE — 97110 THERAPEUTIC EXERCISES: CPT

## 2017-01-01 PROCEDURE — 85025 COMPLETE CBC W/AUTO DIFF WBC: CPT | Performed by: HOSPITALIST

## 2017-01-01 PROCEDURE — 94760 N-INVAS EAR/PLS OXIMETRY 1: CPT

## 2017-01-01 PROCEDURE — 87040 BLOOD CULTURE FOR BACTERIA: CPT | Performed by: EMERGENCY MEDICINE

## 2017-01-01 PROCEDURE — 85027 COMPLETE CBC AUTOMATED: CPT | Performed by: INTERNAL MEDICINE

## 2017-01-01 PROCEDURE — 99291 CRITICAL CARE FIRST HOUR: CPT | Performed by: INTERNAL MEDICINE

## 2017-01-01 PROCEDURE — 80053 COMPREHEN METABOLIC PANEL: CPT

## 2017-01-01 PROCEDURE — 93308 TTE F-UP OR LMTD: CPT | Performed by: INTERNAL MEDICINE

## 2017-01-01 PROCEDURE — 86140 C-REACTIVE PROTEIN: CPT

## 2017-01-01 PROCEDURE — 25010000002 LORAZEPAM PER 2 MG: Performed by: NURSE PRACTITIONER

## 2017-01-01 PROCEDURE — 92612 ENDOSCOPY SWALLOW (FEES) VID: CPT

## 2017-01-01 PROCEDURE — 84100 ASSAY OF PHOSPHORUS: CPT

## 2017-01-01 PROCEDURE — 84100 ASSAY OF PHOSPHORUS: CPT | Performed by: INTERNAL MEDICINE

## 2017-01-01 PROCEDURE — 83605 ASSAY OF LACTIC ACID: CPT | Performed by: EMERGENCY MEDICINE

## 2017-01-01 PROCEDURE — 83935 ASSAY OF URINE OSMOLALITY: CPT | Performed by: HOSPITALIST

## 2017-01-01 PROCEDURE — 25010000002 VANCOMYCIN: Performed by: HOSPITALIST

## 2017-01-01 PROCEDURE — 80053 COMPREHEN METABOLIC PANEL: CPT | Performed by: NURSE PRACTITIONER

## 2017-01-01 PROCEDURE — 25010000002 PIPERACILLIN SOD-TAZOBACTAM PER 1 G: Performed by: HOSPITALIST

## 2017-01-01 PROCEDURE — 74000 HC ABDOMEN KUB: CPT

## 2017-01-01 PROCEDURE — 25010000002 VANCOMYCIN HCL IN NACL 1.75-0.9 GM/500ML-% SOLUTION

## 2017-01-01 PROCEDURE — 93308 TTE F-UP OR LMTD: CPT

## 2017-01-01 PROCEDURE — 80053 COMPREHEN METABOLIC PANEL: CPT | Performed by: INTERNAL MEDICINE

## 2017-01-01 PROCEDURE — 87086 URINE CULTURE/COLONY COUNT: CPT | Performed by: NURSE PRACTITIONER

## 2017-01-01 PROCEDURE — 92610 EVALUATE SWALLOWING FUNCTION: CPT

## 2017-01-01 PROCEDURE — 36600 WITHDRAWAL OF ARTERIAL BLOOD: CPT | Performed by: NURSE PRACTITIONER

## 2017-01-01 PROCEDURE — 99232 SBSQ HOSP IP/OBS MODERATE 35: CPT | Performed by: INTERNAL MEDICINE

## 2017-01-01 PROCEDURE — 84100 ASSAY OF PHOSPHORUS: CPT | Performed by: HOSPITALIST

## 2017-01-01 PROCEDURE — 99233 SBSQ HOSP IP/OBS HIGH 50: CPT | Performed by: HOSPITALIST

## 2017-01-01 PROCEDURE — 84145 PROCALCITONIN (PCT): CPT | Performed by: NURSE PRACTITIONER

## 2017-01-01 PROCEDURE — 25010000002 PIPERACILLIN-TAZOBACTAM: Performed by: NURSE PRACTITIONER

## 2017-01-01 PROCEDURE — 0DH67UZ INSERTION OF FEEDING DEVICE INTO STOMACH, VIA NATURAL OR ARTIFICIAL OPENING: ICD-10-PCS | Performed by: HOSPITALIST

## 2017-01-01 PROCEDURE — 84134 ASSAY OF PREALBUMIN: CPT

## 2017-01-01 PROCEDURE — 25010000002 SULFUR HEXAFLUORIDE MICROSPH 60.7-25 MG RECONSTITUTED SUSPENSION: Performed by: HOSPITALIST

## 2017-01-01 PROCEDURE — 80048 BASIC METABOLIC PNL TOTAL CA: CPT | Performed by: HOSPITALIST

## 2017-01-01 PROCEDURE — 83735 ASSAY OF MAGNESIUM: CPT | Performed by: EMERGENCY MEDICINE

## 2017-01-01 PROCEDURE — 25010000002 FUROSEMIDE PER 20 MG: Performed by: FAMILY MEDICINE

## 2017-01-01 PROCEDURE — 83690 ASSAY OF LIPASE: CPT | Performed by: EMERGENCY MEDICINE

## 2017-01-01 PROCEDURE — 85610 PROTHROMBIN TIME: CPT | Performed by: INTERNAL MEDICINE

## 2017-01-01 PROCEDURE — 25010000002 LORAZEPAM PER 2 MG: Performed by: HOSPITALIST

## 2017-01-01 PROCEDURE — 25010000002 MAGNESIUM SULFATE PER 500 MG OF MAGNESIUM: Performed by: EMERGENCY MEDICINE

## 2017-01-01 PROCEDURE — 85610 PROTHROMBIN TIME: CPT | Performed by: HOSPITALIST

## 2017-01-01 PROCEDURE — 82140 ASSAY OF AMMONIA: CPT | Performed by: NURSE PRACTITIONER

## 2017-01-01 PROCEDURE — 99239 HOSP IP/OBS DSCHRG MGMT >30: CPT | Performed by: HOSPITALIST

## 2017-01-01 PROCEDURE — 25010000003 POTASSIUM CHLORIDE 10 MEQ/100ML SOLUTION: Performed by: INTERNAL MEDICINE

## 2017-01-01 PROCEDURE — 85027 COMPLETE CBC AUTOMATED: CPT | Performed by: HOSPITALIST

## 2017-01-01 PROCEDURE — C1751 CATH, INF, PER/CENT/MIDLINE: HCPCS

## 2017-01-01 PROCEDURE — 84300 ASSAY OF URINE SODIUM: CPT | Performed by: HOSPITALIST

## 2017-01-01 PROCEDURE — 84443 ASSAY THYROID STIM HORMONE: CPT | Performed by: INTERNAL MEDICINE

## 2017-01-01 PROCEDURE — 80306 DRUG TEST PRSMV INSTRMNT: CPT | Performed by: EMERGENCY MEDICINE

## 2017-01-01 PROCEDURE — 36600 WITHDRAWAL OF ARTERIAL BLOOD: CPT | Performed by: EMERGENCY MEDICINE

## 2017-01-01 PROCEDURE — 82330 ASSAY OF CALCIUM: CPT | Performed by: INTERNAL MEDICINE

## 2017-01-01 PROCEDURE — 99285 EMERGENCY DEPT VISIT HI MDM: CPT

## 2017-01-01 PROCEDURE — 94640 AIRWAY INHALATION TREATMENT: CPT

## 2017-01-01 PROCEDURE — 87086 URINE CULTURE/COLONY COUNT: CPT | Performed by: EMERGENCY MEDICINE

## 2017-01-01 PROCEDURE — 4A02X4A MEASUREMENT OF CARDIAC ELECTRICAL ACTIVITY, GUIDANCE, EXTERNAL APPROACH: ICD-10-PCS | Performed by: INTERNAL MEDICINE

## 2017-01-01 PROCEDURE — 82570 ASSAY OF URINE CREATININE: CPT | Performed by: HOSPITALIST

## 2017-01-01 PROCEDURE — 97163 PT EVAL HIGH COMPLEX 45 MIN: CPT

## 2017-01-01 PROCEDURE — 83735 ASSAY OF MAGNESIUM: CPT

## 2017-01-01 PROCEDURE — 87186 SC STD MICRODIL/AGAR DIL: CPT | Performed by: EMERGENCY MEDICINE

## 2017-01-01 PROCEDURE — P9612 CATHETERIZE FOR URINE SPEC: HCPCS

## 2017-01-01 PROCEDURE — 25010000002 VANCOMYCIN HCL IN NACL 2-0.9 GM/500ML-% SOLUTION: Performed by: EMERGENCY MEDICINE

## 2017-01-01 PROCEDURE — 25010000002 DIPHENHYDRAMINE PER 50 MG: Performed by: EMERGENCY MEDICINE

## 2017-01-01 PROCEDURE — 3E0G76Z INTRODUCTION OF NUTRITIONAL SUBSTANCE INTO UPPER GI, VIA NATURAL OR ARTIFICIAL OPENING: ICD-10-PCS | Performed by: INTERNAL MEDICINE

## 2017-01-01 PROCEDURE — 25010000003 CEFTRIAXONE PER 250 MG: Performed by: INTERNAL MEDICINE

## 2017-01-01 PROCEDURE — C1894 INTRO/SHEATH, NON-LASER: HCPCS

## 2017-01-01 PROCEDURE — 74176 CT ABD & PELVIS W/O CONTRAST: CPT

## 2017-01-01 PROCEDURE — 99233 SBSQ HOSP IP/OBS HIGH 50: CPT | Performed by: INTERNAL MEDICINE

## 2017-01-01 PROCEDURE — 5A09457 ASSISTANCE WITH RESPIRATORY VENTILATION, 24-96 CONSECUTIVE HOURS, CONTINUOUS POSITIVE AIRWAY PRESSURE: ICD-10-PCS | Performed by: INTERNAL MEDICINE

## 2017-01-01 PROCEDURE — 25010000002 LORAZEPAM PER 2 MG: Performed by: EMERGENCY MEDICINE

## 2017-01-01 PROCEDURE — 81001 URINALYSIS AUTO W/SCOPE: CPT | Performed by: EMERGENCY MEDICINE

## 2017-01-01 PROCEDURE — 25010000002 THIAMINE PER 100 MG: Performed by: EMERGENCY MEDICINE

## 2017-01-01 PROCEDURE — 83605 ASSAY OF LACTIC ACID: CPT | Performed by: INTERNAL MEDICINE

## 2017-01-01 PROCEDURE — 83735 ASSAY OF MAGNESIUM: CPT | Performed by: NURSE PRACTITIONER

## 2017-01-01 PROCEDURE — 83930 ASSAY OF BLOOD OSMOLALITY: CPT | Performed by: HOSPITALIST

## 2017-01-01 PROCEDURE — 93005 ELECTROCARDIOGRAM TRACING: CPT | Performed by: EMERGENCY MEDICINE

## 2017-01-01 PROCEDURE — 85027 COMPLETE CBC AUTOMATED: CPT | Performed by: NURSE PRACTITIONER

## 2017-01-01 PROCEDURE — 84439 ASSAY OF FREE THYROXINE: CPT | Performed by: INTERNAL MEDICINE

## 2017-01-01 PROCEDURE — 70450 CT HEAD/BRAIN W/O DYE: CPT

## 2017-01-01 PROCEDURE — 83605 ASSAY OF LACTIC ACID: CPT | Performed by: NURSE PRACTITIONER

## 2017-01-01 PROCEDURE — 84145 PROCALCITONIN (PCT): CPT | Performed by: HOSPITALIST

## 2017-01-01 PROCEDURE — 82550 ASSAY OF CK (CPK): CPT | Performed by: EMERGENCY MEDICINE

## 2017-01-01 PROCEDURE — 02HV33Z INSERTION OF INFUSION DEVICE INTO SUPERIOR VENA CAVA, PERCUTANEOUS APPROACH: ICD-10-PCS | Performed by: INTERNAL MEDICINE

## 2017-01-01 PROCEDURE — 83690 ASSAY OF LIPASE: CPT | Performed by: NURSE PRACTITIONER

## 2017-01-01 PROCEDURE — 84484 ASSAY OF TROPONIN QUANT: CPT

## 2017-01-01 PROCEDURE — 80053 COMPREHEN METABOLIC PANEL: CPT | Performed by: EMERGENCY MEDICINE

## 2017-01-01 RX ORDER — DIPHENHYDRAMINE HYDROCHLORIDE 50 MG/ML
25 INJECTION INTRAMUSCULAR; INTRAVENOUS ONCE
Status: COMPLETED | OUTPATIENT
Start: 2017-01-01 | End: 2017-01-01

## 2017-01-01 RX ORDER — CEFTRIAXONE SODIUM 2 G/50ML
2 INJECTION, SOLUTION INTRAVENOUS EVERY 24 HOURS
Status: DISCONTINUED | OUTPATIENT
Start: 2017-01-01 | End: 2017-01-01

## 2017-01-01 RX ORDER — LORAZEPAM 2 MG/ML
1 INJECTION INTRAMUSCULAR ONCE
Status: COMPLETED | OUTPATIENT
Start: 2017-01-01 | End: 2017-01-01

## 2017-01-01 RX ORDER — FUROSEMIDE 10 MG/ML
40 INJECTION INTRAMUSCULAR; INTRAVENOUS EVERY 12 HOURS
Status: COMPLETED | OUTPATIENT
Start: 2017-01-01 | End: 2017-01-01

## 2017-01-01 RX ORDER — FAMOTIDINE 10 MG/ML
20 INJECTION, SOLUTION INTRAVENOUS EVERY 12 HOURS SCHEDULED
Status: DISCONTINUED | OUTPATIENT
Start: 2017-01-01 | End: 2017-01-01

## 2017-01-01 RX ORDER — MAGNESIUM SULFATE HEPTAHYDRATE 40 MG/ML
2 INJECTION, SOLUTION INTRAVENOUS AS NEEDED
Status: DISCONTINUED | OUTPATIENT
Start: 2017-01-01 | End: 2017-01-01 | Stop reason: HOSPADM

## 2017-01-01 RX ORDER — LORAZEPAM 2 MG/ML
2 INJECTION INTRAMUSCULAR
Status: DISCONTINUED | OUTPATIENT
Start: 2017-01-01 | End: 2017-01-01 | Stop reason: HOSPADM

## 2017-01-01 RX ORDER — GINSENG 100 MG
CAPSULE ORAL EVERY 12 HOURS SCHEDULED
Status: DISCONTINUED | OUTPATIENT
Start: 2017-01-01 | End: 2017-01-01 | Stop reason: HOSPADM

## 2017-01-01 RX ORDER — FOLIC ACID 1 MG/1
1 TABLET ORAL DAILY
Status: DISCONTINUED | OUTPATIENT
Start: 2017-01-01 | End: 2017-01-01 | Stop reason: HOSPADM

## 2017-01-01 RX ORDER — LORAZEPAM 1 MG/1
2 TABLET ORAL
Status: DISCONTINUED | OUTPATIENT
Start: 2017-01-01 | End: 2017-01-01 | Stop reason: HOSPADM

## 2017-01-01 RX ORDER — DIAZEPAM 5 MG/1
5 TABLET ORAL EVERY 8 HOURS PRN
Status: DISCONTINUED | OUTPATIENT
Start: 2017-01-01 | End: 2017-01-01

## 2017-01-01 RX ORDER — LACTULOSE 10 G/15ML
30 SOLUTION ORAL
Status: DISCONTINUED | OUTPATIENT
Start: 2017-01-01 | End: 2017-01-01

## 2017-01-01 RX ORDER — SODIUM CHLORIDE 0.9 % (FLUSH) 0.9 %
1-10 SYRINGE (ML) INJECTION AS NEEDED
Status: DISCONTINUED | OUTPATIENT
Start: 2017-01-01 | End: 2017-01-01 | Stop reason: HOSPADM

## 2017-01-01 RX ORDER — GLYCOPYRROLATE 0.2 MG/ML
0.4 INJECTION INTRAMUSCULAR; INTRAVENOUS EVERY 4 HOURS
Status: DISCONTINUED | OUTPATIENT
Start: 2017-01-01 | End: 2017-01-01 | Stop reason: HOSPADM

## 2017-01-01 RX ORDER — LORAZEPAM 2 MG/ML
0.5 INJECTION INTRAMUSCULAR EVERY 6 HOURS
Status: DISCONTINUED | OUTPATIENT
Start: 2017-01-01 | End: 2017-01-01

## 2017-01-01 RX ORDER — THIAMINE MONONITRATE (VIT B1) 100 MG
100 TABLET ORAL DAILY
Status: DISCONTINUED | OUTPATIENT
Start: 2017-01-01 | End: 2017-01-01 | Stop reason: HOSPADM

## 2017-01-01 RX ORDER — LACTULOSE 10 G/15ML
30 SOLUTION ORAL EVERY 4 HOURS PRN
Status: DISCONTINUED | OUTPATIENT
Start: 2017-01-01 | End: 2017-01-01 | Stop reason: HOSPADM

## 2017-01-01 RX ORDER — SODIUM CHLORIDE 0.9 % (FLUSH) 0.9 %
10 SYRINGE (ML) INJECTION AS NEEDED
Status: DISCONTINUED | OUTPATIENT
Start: 2017-01-01 | End: 2017-01-01

## 2017-01-01 RX ORDER — HYDROMORPHONE HYDROCHLORIDE 1 MG/ML
0.5 INJECTION, SOLUTION INTRAMUSCULAR; INTRAVENOUS; SUBCUTANEOUS
Status: DISCONTINUED | OUTPATIENT
Start: 2017-01-01 | End: 2017-01-01 | Stop reason: HOSPADM

## 2017-01-01 RX ORDER — LACTULOSE 10 G/15ML
20 SOLUTION ORAL EVERY 6 HOURS
Status: DISCONTINUED | OUTPATIENT
Start: 2017-01-01 | End: 2017-01-01

## 2017-01-01 RX ORDER — MAGNESIUM SULFATE HEPTAHYDRATE 40 MG/ML
4 INJECTION, SOLUTION INTRAVENOUS AS NEEDED
Status: DISCONTINUED | OUTPATIENT
Start: 2017-01-01 | End: 2017-01-01 | Stop reason: HOSPADM

## 2017-01-01 RX ORDER — DEXMEDETOMIDINE HYDROCHLORIDE 4 UG/ML
INJECTION, SOLUTION INTRAVENOUS
Status: COMPLETED
Start: 2017-01-01 | End: 2017-01-01

## 2017-01-01 RX ORDER — LACTULOSE 10 G/15ML
30 SOLUTION ORAL
Status: COMPLETED | OUTPATIENT
Start: 2017-01-01 | End: 2017-01-01

## 2017-01-01 RX ORDER — AMINO ACIDS/PROTEIN HYDROLYS 15G-100/30
1 LIQUID (ML) ORAL 2 TIMES DAILY
Status: DISCONTINUED | OUTPATIENT
Start: 2017-01-01 | End: 2017-01-01

## 2017-01-01 RX ORDER — LACTULOSE 10 G/15ML
20 SOLUTION ORAL 3 TIMES DAILY
Status: DISCONTINUED | OUTPATIENT
Start: 2017-01-01 | End: 2017-01-01

## 2017-01-01 RX ORDER — LORAZEPAM 2 MG/ML
1 INJECTION INTRAMUSCULAR EVERY 4 HOURS PRN
Status: DISCONTINUED | OUTPATIENT
Start: 2017-01-01 | End: 2017-01-01

## 2017-01-01 RX ORDER — SODIUM CHLORIDE 9 MG/ML
50 INJECTION, SOLUTION INTRAVENOUS CONTINUOUS
Status: DISCONTINUED | OUTPATIENT
Start: 2017-01-01 | End: 2017-01-01

## 2017-01-01 RX ORDER — DIAZEPAM 5 MG/1
5 TABLET ORAL EVERY 8 HOURS SCHEDULED
Status: DISCONTINUED | OUTPATIENT
Start: 2017-01-01 | End: 2017-01-01

## 2017-01-01 RX ORDER — DEXMEDETOMIDINE HYDROCHLORIDE 4 UG/ML
.2-1.5 INJECTION, SOLUTION INTRAVENOUS
Status: DISCONTINUED | OUTPATIENT
Start: 2017-01-01 | End: 2017-01-01

## 2017-01-01 RX ORDER — VANCOMYCIN 2 GRAM/500 ML IN 0.9 % SODIUM CHLORIDE INTRAVENOUS
2000 ONCE
Status: COMPLETED | OUTPATIENT
Start: 2017-01-01 | End: 2017-01-01

## 2017-01-01 RX ORDER — FUROSEMIDE 10 MG/ML
20 INJECTION INTRAMUSCULAR; INTRAVENOUS ONCE
Status: COMPLETED | OUTPATIENT
Start: 2017-01-01 | End: 2017-01-01

## 2017-01-01 RX ORDER — DEXTROSE MONOHYDRATE 50 MG/ML
20 INJECTION, SOLUTION INTRAVENOUS CONTINUOUS
Status: DISCONTINUED | OUTPATIENT
Start: 2017-01-01 | End: 2017-01-01 | Stop reason: HOSPADM

## 2017-01-01 RX ORDER — NYSTATIN 100000 [USP'U]/G
POWDER TOPICAL EVERY 12 HOURS SCHEDULED
Status: DISCONTINUED | OUTPATIENT
Start: 2017-01-01 | End: 2017-01-01 | Stop reason: HOSPADM

## 2017-01-01 RX ORDER — DIAZEPAM 5 MG/ML
5 INJECTION, SOLUTION INTRAMUSCULAR; INTRAVENOUS EVERY 8 HOURS PRN
Status: DISCONTINUED | OUTPATIENT
Start: 2017-01-01 | End: 2017-01-01

## 2017-01-01 RX ORDER — SODIUM CHLORIDE 9 MG/ML
100 INJECTION, SOLUTION INTRAVENOUS CONTINUOUS
Status: DISCONTINUED | OUTPATIENT
Start: 2017-01-01 | End: 2017-01-01

## 2017-01-01 RX ORDER — LACTULOSE 10 G/15ML
20 SOLUTION ORAL 2 TIMES DAILY
Status: DISCONTINUED | OUTPATIENT
Start: 2017-01-01 | End: 2017-01-01

## 2017-01-01 RX ORDER — FAMOTIDINE 20 MG/1
20 TABLET, FILM COATED ORAL 2 TIMES DAILY
Status: DISCONTINUED | OUTPATIENT
Start: 2017-01-01 | End: 2017-01-01 | Stop reason: HOSPADM

## 2017-01-01 RX ORDER — MULTIVIT AND MINERALS-FERROUS GLUCONATE 9 MG IRON/15 ML ORAL LIQUID 9 MG/15 ML
15 LIQUID (ML) ORAL DAILY
Status: DISCONTINUED | OUTPATIENT
Start: 2017-01-01 | End: 2017-01-01 | Stop reason: HOSPADM

## 2017-01-01 RX ORDER — LORAZEPAM 2 MG/ML
0.5 INJECTION INTRAMUSCULAR EVERY 8 HOURS
Status: DISCONTINUED | OUTPATIENT
Start: 2017-01-01 | End: 2017-01-01

## 2017-01-01 RX ORDER — VANCOMYCIN/0.9 % SOD CHLORIDE 1.5G/250ML
1500 PLASTIC BAG, INJECTION (ML) INTRAVENOUS EVERY 24 HOURS
Status: DISCONTINUED | OUTPATIENT
Start: 2017-01-01 | End: 2017-01-01

## 2017-01-01 RX ORDER — POTASSIUM CHLORIDE 1.5 G/1.77G
40 POWDER, FOR SOLUTION ORAL AS NEEDED
Status: DISCONTINUED | OUTPATIENT
Start: 2017-01-01 | End: 2017-01-01 | Stop reason: HOSPADM

## 2017-01-01 RX ORDER — LORAZEPAM 1 MG/1
1 TABLET ORAL
Status: DISCONTINUED | OUTPATIENT
Start: 2017-01-01 | End: 2017-01-01 | Stop reason: HOSPADM

## 2017-01-01 RX ORDER — LACTULOSE 10 G/15ML
20 SOLUTION ORAL EVERY 4 HOURS
Status: DISCONTINUED | OUTPATIENT
Start: 2017-01-01 | End: 2017-01-01

## 2017-01-01 RX ORDER — AMINO ACIDS/PROTEIN HYDROLYS 15G-100/30
1 LIQUID (ML) ORAL DAILY
Status: DISCONTINUED | OUTPATIENT
Start: 2017-01-01 | End: 2017-01-01

## 2017-01-01 RX ORDER — DIAZEPAM 5 MG/1
10 TABLET ORAL EVERY 8 HOURS SCHEDULED
Status: DISCONTINUED | OUTPATIENT
Start: 2017-01-01 | End: 2017-01-01

## 2017-01-01 RX ORDER — DEXTROSE MONOHYDRATE 25 G/50ML
25 INJECTION, SOLUTION INTRAVENOUS ONCE
Status: COMPLETED | OUTPATIENT
Start: 2017-01-01 | End: 2017-01-01

## 2017-01-01 RX ORDER — LACTULOSE 10 G/15ML
30 SOLUTION ORAL 4 TIMES DAILY
Status: DISCONTINUED | OUTPATIENT
Start: 2017-01-01 | End: 2017-01-01

## 2017-01-01 RX ORDER — ONDANSETRON 2 MG/ML
4 INJECTION INTRAMUSCULAR; INTRAVENOUS EVERY 6 HOURS PRN
Status: DISCONTINUED | OUTPATIENT
Start: 2017-01-01 | End: 2017-01-01 | Stop reason: HOSPADM

## 2017-01-01 RX ORDER — IPRATROPIUM BROMIDE AND ALBUTEROL SULFATE 2.5; .5 MG/3ML; MG/3ML
3 SOLUTION RESPIRATORY (INHALATION) EVERY 4 HOURS PRN
Status: DISCONTINUED | OUTPATIENT
Start: 2017-01-01 | End: 2017-01-01 | Stop reason: HOSPADM

## 2017-01-01 RX ORDER — POTASSIUM CHLORIDE 7.45 MG/ML
10 INJECTION INTRAVENOUS
Status: DISCONTINUED | OUTPATIENT
Start: 2017-01-01 | End: 2017-01-01 | Stop reason: HOSPADM

## 2017-01-01 RX ORDER — VANCOMYCIN 1.75 GRAM/500 ML IN 0.9 % SODIUM CHLORIDE INTRAVENOUS
1750 EVERY 24 HOURS
Status: DISCONTINUED | OUTPATIENT
Start: 2017-01-01 | End: 2017-01-01

## 2017-01-01 RX ORDER — DEXTROSE MONOHYDRATE 50 MG/ML
50 INJECTION, SOLUTION INTRAVENOUS CONTINUOUS
Status: ACTIVE | OUTPATIENT
Start: 2017-01-01 | End: 2017-01-01

## 2017-01-01 RX ORDER — LORAZEPAM 2 MG/ML
1 INJECTION INTRAMUSCULAR
Status: DISCONTINUED | OUTPATIENT
Start: 2017-01-01 | End: 2017-01-01 | Stop reason: HOSPADM

## 2017-01-01 RX ORDER — POTASSIUM CHLORIDE 750 MG/1
40 CAPSULE, EXTENDED RELEASE ORAL AS NEEDED
Status: DISCONTINUED | OUTPATIENT
Start: 2017-01-01 | End: 2017-01-01 | Stop reason: HOSPADM

## 2017-01-01 RX ADMIN — SODIUM CHLORIDE 250 ML/HR: 9 INJECTION, SOLUTION INTRAVENOUS at 14:24

## 2017-01-01 RX ADMIN — Medication: at 21:57

## 2017-01-01 RX ADMIN — NYSTATIN: 100000 POWDER TOPICAL at 08:13

## 2017-01-01 RX ADMIN — LACTULOSE 20 G: 10 SOLUTION ORAL at 22:03

## 2017-01-01 RX ADMIN — AMPICILLIN SODIUM 1 G: 1 INJECTION, POWDER, FOR SOLUTION INTRAMUSCULAR; INTRAVENOUS at 19:45

## 2017-01-01 RX ADMIN — FUROSEMIDE 40 MG: 10 INJECTION, SOLUTION INTRAMUSCULAR; INTRAVENOUS at 11:51

## 2017-01-01 RX ADMIN — THIAMINE HYDROCHLORIDE 1000 ML/HR: 100 INJECTION, SOLUTION INTRAMUSCULAR; INTRAVENOUS at 11:29

## 2017-01-01 RX ADMIN — POTASSIUM CHLORIDE 40 MEQ: 1.5 POWDER, FOR SOLUTION ORAL at 17:06

## 2017-01-01 RX ADMIN — NYSTATIN: 100000 POWDER TOPICAL at 08:02

## 2017-01-01 RX ADMIN — AMPICILLIN SODIUM 1 G: 1 INJECTION, POWDER, FOR SOLUTION INTRAMUSCULAR; INTRAVENOUS at 19:28

## 2017-01-01 RX ADMIN — RIFAXIMIN 550 MG: 550 TABLET ORAL at 21:07

## 2017-01-01 RX ADMIN — DIAZEPAM 5 MG: 5 TABLET ORAL at 14:48

## 2017-01-01 RX ADMIN — MULTIVITAMIN 15 ML: LIQUID ORAL at 08:05

## 2017-01-01 RX ADMIN — METRONIDAZOLE 500 MG: 500 INJECTION, SOLUTION INTRAVENOUS at 02:04

## 2017-01-01 RX ADMIN — MULTIVITAMIN 15 ML: LIQUID ORAL at 09:56

## 2017-01-01 RX ADMIN — LORAZEPAM 1 MG: 2 INJECTION INTRAMUSCULAR; INTRAVENOUS at 16:46

## 2017-01-01 RX ADMIN — DEXMEDETOMIDINE HYDROCHLORIDE 0.4 MCG/KG/HR: 4 INJECTION, SOLUTION INTRAVENOUS at 02:24

## 2017-01-01 RX ADMIN — GLYCOPYRROLATE 0.4 MG: 0.2 INJECTION, SOLUTION INTRAMUSCULAR; INTRAVENOUS at 01:45

## 2017-01-01 RX ADMIN — MULTIVITAMIN 15 ML: LIQUID ORAL at 12:38

## 2017-01-01 RX ADMIN — METRONIDAZOLE 500 MG: 500 INJECTION, SOLUTION INTRAVENOUS at 09:44

## 2017-01-01 RX ADMIN — DIAZEPAM 5 MG: 5 TABLET ORAL at 22:02

## 2017-01-01 RX ADMIN — AMPICILLIN SODIUM 1 G: 1 INJECTION, POWDER, FOR SOLUTION INTRAMUSCULAR; INTRAVENOUS at 01:38

## 2017-01-01 RX ADMIN — Medication: at 20:15

## 2017-01-01 RX ADMIN — RIFAXIMIN 550 MG: 550 TABLET ORAL at 15:10

## 2017-01-01 RX ADMIN — AMPICILLIN SODIUM 1 G: 1 INJECTION, POWDER, FOR SOLUTION INTRAMUSCULAR; INTRAVENOUS at 14:48

## 2017-01-01 RX ADMIN — DEXMEDETOMIDINE HYDROCHLORIDE 0.8 MCG/KG/HR: 4 INJECTION, SOLUTION INTRAVENOUS at 01:15

## 2017-01-01 RX ADMIN — POTASSIUM CHLORIDE 40 MEQ: 1.5 POWDER, FOR SOLUTION ORAL at 01:10

## 2017-01-01 RX ADMIN — Medication 1 APPLICATION: at 17:37

## 2017-01-01 RX ADMIN — MULTIVITAMIN 15 ML: LIQUID ORAL at 08:14

## 2017-01-01 RX ADMIN — Medication 1 APPLICATION: at 09:26

## 2017-01-01 RX ADMIN — FOLIC ACID 1 MG: 1 TABLET ORAL at 08:04

## 2017-01-01 RX ADMIN — FOLIC ACID 1 MG: 1 TABLET ORAL at 08:15

## 2017-01-01 RX ADMIN — DIAZEPAM 10 MG: 5 TABLET ORAL at 05:50

## 2017-01-01 RX ADMIN — LACTULOSE 20 G: 10 SOLUTION ORAL at 21:01

## 2017-01-01 RX ADMIN — SODIUM CHLORIDE 100 ML/HR: 9 INJECTION, SOLUTION INTRAVENOUS at 17:07

## 2017-01-01 RX ADMIN — LACTULOSE 30 G: 10 SOLUTION ORAL at 19:57

## 2017-01-01 RX ADMIN — AMPICILLIN SODIUM 1 G: 1 INJECTION, POWDER, FOR SOLUTION INTRAMUSCULAR; INTRAVENOUS at 08:15

## 2017-01-01 RX ADMIN — DEXTROSE MONOHYDRATE 125 ML/HR: 50 INJECTION, SOLUTION INTRAVENOUS at 08:15

## 2017-01-01 RX ADMIN — DIAZEPAM 10 MG: 5 TABLET ORAL at 21:47

## 2017-01-01 RX ADMIN — AMPICILLIN SODIUM 1 G: 1 INJECTION, POWDER, FOR SOLUTION INTRAMUSCULAR; INTRAVENOUS at 13:11

## 2017-01-01 RX ADMIN — METRONIDAZOLE 500 MG: 500 INJECTION, SOLUTION INTRAVENOUS at 17:07

## 2017-01-01 RX ADMIN — DIAZEPAM 10 MG: 5 TABLET ORAL at 15:09

## 2017-01-01 RX ADMIN — RIFAXIMIN 550 MG: 550 TABLET ORAL at 20:09

## 2017-01-01 RX ADMIN — AMPICILLIN SODIUM 1 G: 1 INJECTION, POWDER, FOR SOLUTION INTRAMUSCULAR; INTRAVENOUS at 07:58

## 2017-01-01 RX ADMIN — FAMOTIDINE 20 MG: 20 TABLET ORAL at 17:19

## 2017-01-01 RX ADMIN — DIAZEPAM 10 MG: 5 TABLET ORAL at 13:50

## 2017-01-01 RX ADMIN — DEXMEDETOMIDINE HYDROCHLORIDE 0.6 MCG/KG/HR: 4 INJECTION, SOLUTION INTRAVENOUS at 06:29

## 2017-01-01 RX ADMIN — LACTULOSE 20 G: 10 SOLUTION ORAL at 04:00

## 2017-01-01 RX ADMIN — FUROSEMIDE 20 MG: 10 INJECTION, SOLUTION INTRAMUSCULAR; INTRAVENOUS at 01:46

## 2017-01-01 RX ADMIN — FAMOTIDINE 20 MG: 20 TABLET ORAL at 18:32

## 2017-01-01 RX ADMIN — TAZOBACTAM SODIUM AND PIPERACILLIN SODIUM 3.38 G: 375; 3 INJECTION, SOLUTION INTRAVENOUS at 20:14

## 2017-01-01 RX ADMIN — DEXTROSE MONOHYDRATE 50 ML/HR: 50 INJECTION, SOLUTION INTRAVENOUS at 09:00

## 2017-01-01 RX ADMIN — AMPICILLIN SODIUM 1 G: 1 INJECTION, POWDER, FOR SOLUTION INTRAMUSCULAR; INTRAVENOUS at 20:10

## 2017-01-01 RX ADMIN — LACTULOSE 30 G: 10 SOLUTION ORAL at 00:08

## 2017-01-01 RX ADMIN — SODIUM CHLORIDE 500 ML: 9 INJECTION, SOLUTION INTRAVENOUS at 15:12

## 2017-01-01 RX ADMIN — LACTULOSE 20 G: 10 SOLUTION ORAL at 09:59

## 2017-01-01 RX ADMIN — Medication: at 20:26

## 2017-01-01 RX ADMIN — SODIUM CHLORIDE 50 ML/HR: 9 INJECTION, SOLUTION INTRAVENOUS at 18:43

## 2017-01-01 RX ADMIN — POTASSIUM CHLORIDE 10 MEQ: 10 INJECTION, SOLUTION INTRAVENOUS at 06:57

## 2017-01-01 RX ADMIN — NYSTATIN 1 APPLICATION: 100000 POWDER TOPICAL at 08:04

## 2017-01-01 RX ADMIN — POTASSIUM & SODIUM PHOSPHATES POWDER PACK 280-160-250 MG 2 PACKET: 280-160-250 PACK at 06:52

## 2017-01-01 RX ADMIN — POTASSIUM CHLORIDE 10 MEQ: 10 INJECTION, SOLUTION INTRAVENOUS at 08:36

## 2017-01-01 RX ADMIN — LACTULOSE 30 G: 10 SOLUTION ORAL at 22:25

## 2017-01-01 RX ADMIN — NYSTATIN: 100000 POWDER TOPICAL at 20:21

## 2017-01-01 RX ADMIN — Medication 100 MG: at 08:15

## 2017-01-01 RX ADMIN — NYSTATIN: 100000 POWDER TOPICAL at 20:24

## 2017-01-01 RX ADMIN — NYSTATIN: 100000 POWDER TOPICAL at 20:10

## 2017-01-01 RX ADMIN — DEXMEDETOMIDINE HYDROCHLORIDE 0.4 MCG/KG/HR: 4 INJECTION, SOLUTION INTRAVENOUS at 17:37

## 2017-01-01 RX ADMIN — LACTULOSE 20 G: 10 SOLUTION ORAL at 08:12

## 2017-01-01 RX ADMIN — AMPICILLIN SODIUM 1 G: 1 INJECTION, POWDER, FOR SOLUTION INTRAMUSCULAR; INTRAVENOUS at 09:12

## 2017-01-01 RX ADMIN — NYSTATIN 1 APPLICATION: 100000 POWDER TOPICAL at 09:26

## 2017-01-01 RX ADMIN — POTASSIUM PHOSPHATE, MONOBASIC AND POTASSIUM PHOSPHATE, DIBASIC 45 MMOL: 224; 236 INJECTION, SOLUTION INTRAVENOUS at 11:16

## 2017-01-01 RX ADMIN — NYSTATIN: 100000 POWDER TOPICAL at 08:58

## 2017-01-01 RX ADMIN — RIFAXIMIN 550 MG: 550 TABLET ORAL at 20:14

## 2017-01-01 RX ADMIN — SULFUR HEXAFLUORIDE 3 ML: KIT at 11:10

## 2017-01-01 RX ADMIN — METRONIDAZOLE 500 MG: 500 INJECTION, SOLUTION INTRAVENOUS at 02:05

## 2017-01-01 RX ADMIN — DEXMEDETOMIDINE HYDROCHLORIDE 0.4 MCG/KG/HR: 4 INJECTION, SOLUTION INTRAVENOUS at 19:36

## 2017-01-01 RX ADMIN — AMPICILLIN SODIUM 1 G: 1 INJECTION, POWDER, FOR SOLUTION INTRAMUSCULAR; INTRAVENOUS at 02:03

## 2017-01-01 RX ADMIN — RIFAXIMIN 550 MG: 550 TABLET ORAL at 08:02

## 2017-01-01 RX ADMIN — AMPICILLIN SODIUM 1 G: 1 INJECTION, POWDER, FOR SOLUTION INTRAMUSCULAR; INTRAVENOUS at 01:29

## 2017-01-01 RX ADMIN — RIFAXIMIN 550 MG: 550 TABLET ORAL at 20:24

## 2017-01-01 RX ADMIN — FAMOTIDINE 20 MG: 20 TABLET ORAL at 09:59

## 2017-01-01 RX ADMIN — FOLIC ACID 1 MG: 1 TABLET ORAL at 10:25

## 2017-01-01 RX ADMIN — NOREPINEPHRINE BITARTRATE 0.02 MCG/KG/MIN: 8 SOLUTION at 16:20

## 2017-01-01 RX ADMIN — FAMOTIDINE 20 MG: 20 TABLET ORAL at 18:06

## 2017-01-01 RX ADMIN — Medication 2000 MG: at 12:38

## 2017-01-01 RX ADMIN — TAZOBACTAM SODIUM AND PIPERACILLIN SODIUM 4.5 G: .5; 4 INJECTION, POWDER, LYOPHILIZED, FOR SOLUTION INTRAVENOUS at 17:07

## 2017-01-01 RX ADMIN — FAMOTIDINE 20 MG: 20 TABLET ORAL at 08:12

## 2017-01-01 RX ADMIN — FOLIC ACID 1 MG: 1 TABLET ORAL at 08:13

## 2017-01-01 RX ADMIN — POTASSIUM CHLORIDE 10 MEQ: 10 INJECTION, SOLUTION INTRAVENOUS at 06:07

## 2017-01-01 RX ADMIN — POTASSIUM & SODIUM PHOSPHATES POWDER PACK 280-160-250 MG 2 PACKET: 280-160-250 PACK at 17:37

## 2017-01-01 RX ADMIN — POTASSIUM CHLORIDE 40 MEQ: 1.5 POWDER, FOR SOLUTION ORAL at 21:01

## 2017-01-01 RX ADMIN — LACTULOSE 30 G: 20 SOLUTION ORAL at 11:31

## 2017-01-01 RX ADMIN — LORAZEPAM 1 MG: 2 INJECTION INTRAMUSCULAR; INTRAVENOUS at 08:55

## 2017-01-01 RX ADMIN — DIAZEPAM 10 MG: 5 TABLET ORAL at 22:16

## 2017-01-01 RX ADMIN — Medication 100 MG: at 10:25

## 2017-01-01 RX ADMIN — AMPICILLIN SODIUM 1 G: 1 INJECTION, POWDER, FOR SOLUTION INTRAMUSCULAR; INTRAVENOUS at 02:06

## 2017-01-01 RX ADMIN — LACTULOSE 20 G: 10 SOLUTION ORAL at 10:41

## 2017-01-01 RX ADMIN — AMPICILLIN SODIUM 1 G: 1 INJECTION, POWDER, FOR SOLUTION INTRAMUSCULAR; INTRAVENOUS at 15:09

## 2017-01-01 RX ADMIN — TAZOBACTAM SODIUM AND PIPERACILLIN SODIUM 4.5 G: .5; 4 INJECTION, POWDER, LYOPHILIZED, FOR SOLUTION INTRAVENOUS at 22:06

## 2017-01-01 RX ADMIN — AMPICILLIN SODIUM 1 G: 1 INJECTION, POWDER, FOR SOLUTION INTRAMUSCULAR; INTRAVENOUS at 19:52

## 2017-01-01 RX ADMIN — FOLIC ACID 1 MG: 1 TABLET ORAL at 11:31

## 2017-01-01 RX ADMIN — GLYCOPYRROLATE 0.4 MG: 0.2 INJECTION, SOLUTION INTRAMUSCULAR; INTRAVENOUS at 05:29

## 2017-01-01 RX ADMIN — Medication: at 20:24

## 2017-01-01 RX ADMIN — Medication: at 20:09

## 2017-01-01 RX ADMIN — LACTULOSE 20 G: 10 SOLUTION ORAL at 21:07

## 2017-01-01 RX ADMIN — LACTULOSE 30 G: 10 SOLUTION ORAL at 18:08

## 2017-01-01 RX ADMIN — Medication 1 APPLICATION: at 08:04

## 2017-01-01 RX ADMIN — AMPICILLIN SODIUM 1 G: 1 INJECTION, POWDER, FOR SOLUTION INTRAMUSCULAR; INTRAVENOUS at 08:13

## 2017-01-01 RX ADMIN — RIFAXIMIN 550 MG: 550 TABLET ORAL at 20:20

## 2017-01-01 RX ADMIN — Medication 100 MG: at 08:06

## 2017-01-01 RX ADMIN — Medication 100 MG: at 08:13

## 2017-01-01 RX ADMIN — AMPICILLIN SODIUM 1 G: 1 INJECTION, POWDER, FOR SOLUTION INTRAMUSCULAR; INTRAVENOUS at 20:24

## 2017-01-01 RX ADMIN — SODIUM CHLORIDE 50 ML/HR: 9 INJECTION, SOLUTION INTRAVENOUS at 18:56

## 2017-01-01 RX ADMIN — Medication: at 08:14

## 2017-01-01 RX ADMIN — AMPICILLIN SODIUM 1 G: 1 INJECTION, POWDER, FOR SOLUTION INTRAMUSCULAR; INTRAVENOUS at 14:19

## 2017-01-01 RX ADMIN — RIFAXIMIN 550 MG: 550 TABLET ORAL at 08:13

## 2017-01-01 RX ADMIN — IPRATROPIUM BROMIDE AND ALBUTEROL SULFATE 3 ML: .5; 3 SOLUTION RESPIRATORY (INHALATION) at 21:20

## 2017-01-01 RX ADMIN — DIAZEPAM 10 MG: 5 TABLET ORAL at 21:42

## 2017-01-01 RX ADMIN — POTASSIUM CHLORIDE 10 MEQ: 10 INJECTION, SOLUTION INTRAVENOUS at 03:09

## 2017-01-01 RX ADMIN — LORAZEPAM 1 MG: 2 INJECTION INTRAMUSCULAR; INTRAVENOUS at 23:14

## 2017-01-01 RX ADMIN — NYSTATIN 1 APPLICATION: 100000 POWDER TOPICAL at 08:14

## 2017-01-01 RX ADMIN — LACTULOSE 30 G: 10 SOLUTION ORAL at 23:40

## 2017-01-01 RX ADMIN — LACTULOSE 30 G: 10 SOLUTION ORAL at 20:09

## 2017-01-01 RX ADMIN — DIAZEPAM 10 MG: 5 TABLET ORAL at 05:40

## 2017-01-01 RX ADMIN — FAMOTIDINE 20 MG: 20 TABLET ORAL at 08:04

## 2017-01-01 RX ADMIN — RIFAXIMIN 550 MG: 550 TABLET ORAL at 20:25

## 2017-01-01 RX ADMIN — FAMOTIDINE 20 MG: 10 INJECTION, SOLUTION INTRAVENOUS at 08:04

## 2017-01-01 RX ADMIN — Medication 9.38 PACKET: at 14:02

## 2017-01-01 RX ADMIN — AMPICILLIN SODIUM 1 G: 1 INJECTION, POWDER, FOR SOLUTION INTRAMUSCULAR; INTRAVENOUS at 02:24

## 2017-01-01 RX ADMIN — DIAZEPAM 5 MG: 5 TABLET ORAL at 05:54

## 2017-01-01 RX ADMIN — Medication 1 PACKET: at 08:14

## 2017-01-01 RX ADMIN — POTASSIUM CHLORIDE 10 MEQ: 10 INJECTION, SOLUTION INTRAVENOUS at 07:28

## 2017-01-01 RX ADMIN — DEXMEDETOMIDINE HYDROCHLORIDE 0.4 MCG/KG/HR: 4 INJECTION, SOLUTION INTRAVENOUS at 08:04

## 2017-01-01 RX ADMIN — Medication: at 08:12

## 2017-01-01 RX ADMIN — DEXTROSE MONOHYDRATE 25 G: 25 INJECTION, SOLUTION INTRAVENOUS at 11:26

## 2017-01-01 RX ADMIN — DEXMEDETOMIDINE HYDROCHLORIDE 0.2 MCG/KG/HR: 4 INJECTION, SOLUTION INTRAVENOUS at 21:46

## 2017-01-01 RX ADMIN — LORAZEPAM 1 MG: 2 INJECTION INTRAMUSCULAR; INTRAVENOUS at 09:40

## 2017-01-01 RX ADMIN — DIAZEPAM 10 MG: 5 TABLET ORAL at 13:11

## 2017-01-01 RX ADMIN — Medication 100 MG: at 08:04

## 2017-01-01 RX ADMIN — DEXMEDETOMIDINE HYDROCHLORIDE 0.2 MCG/KG/HR: 4 INJECTION, SOLUTION INTRAVENOUS at 06:57

## 2017-01-01 RX ADMIN — MULTIVITAMIN 15 ML: LIQUID ORAL at 08:13

## 2017-01-01 RX ADMIN — LORAZEPAM 1 MG: 2 INJECTION INTRAMUSCULAR; INTRAVENOUS at 18:30

## 2017-01-01 RX ADMIN — POTASSIUM CHLORIDE 10 MEQ: 10 INJECTION, SOLUTION INTRAVENOUS at 05:03

## 2017-01-01 RX ADMIN — AMPICILLIN SODIUM 1 G: 1 INJECTION, POWDER, FOR SOLUTION INTRAMUSCULAR; INTRAVENOUS at 20:25

## 2017-01-01 RX ADMIN — DEXMEDETOMIDINE HYDROCHLORIDE 0.6 MCG/KG/HR: 4 INJECTION, SOLUTION INTRAVENOUS at 16:22

## 2017-01-01 RX ADMIN — LACTULOSE 20 G: 10 SOLUTION ORAL at 08:02

## 2017-01-01 RX ADMIN — Medication 100 MG: at 08:14

## 2017-01-01 RX ADMIN — Medication: at 20:10

## 2017-01-01 RX ADMIN — AMPICILLIN SODIUM 1 G: 1 INJECTION, POWDER, FOR SOLUTION INTRAMUSCULAR; INTRAVENOUS at 07:46

## 2017-01-01 RX ADMIN — LACTULOSE 30 G: 20 SOLUTION ORAL at 08:04

## 2017-01-01 RX ADMIN — SODIUM CHLORIDE 1000 ML: 9 INJECTION, SOLUTION INTRAVENOUS at 12:37

## 2017-01-01 RX ADMIN — LACTULOSE 20 G: 10 SOLUTION ORAL at 03:49

## 2017-01-01 RX ADMIN — DEXMEDETOMIDINE HYDROCHLORIDE 0.8 MCG/KG/HR: 4 INJECTION, SOLUTION INTRAVENOUS at 21:01

## 2017-01-01 RX ADMIN — NYSTATIN: 100000 POWDER TOPICAL at 20:49

## 2017-01-01 RX ADMIN — LACTULOSE 30 G: 10 SOLUTION ORAL at 22:06

## 2017-01-01 RX ADMIN — FUROSEMIDE 40 MG: 10 INJECTION, SOLUTION INTRAMUSCULAR; INTRAVENOUS at 01:09

## 2017-01-01 RX ADMIN — DIPHENHYDRAMINE HYDROCHLORIDE 25 MG: 50 INJECTION INTRAMUSCULAR; INTRAVENOUS at 12:38

## 2017-01-01 RX ADMIN — POTASSIUM CHLORIDE 10 MEQ: 10 INJECTION, SOLUTION INTRAVENOUS at 03:49

## 2017-01-01 RX ADMIN — HYDROMORPHONE HYDROCHLORIDE 0.5 MG: 1 INJECTION, SOLUTION INTRAMUSCULAR; INTRAVENOUS; SUBCUTANEOUS at 01:47

## 2017-01-01 RX ADMIN — Medication 1750 MG: at 12:06

## 2017-01-01 RX ADMIN — LACTULOSE 30 G: 10 SOLUTION ORAL at 21:27

## 2017-01-01 RX ADMIN — AMPICILLIN SODIUM 1 G: 1 INJECTION, POWDER, FOR SOLUTION INTRAMUSCULAR; INTRAVENOUS at 08:14

## 2017-01-01 RX ADMIN — FOLIC ACID 1 MG: 1 TABLET ORAL at 09:59

## 2017-01-01 RX ADMIN — LACTULOSE 20 G: 10 SOLUTION ORAL at 09:44

## 2017-01-01 RX ADMIN — FAMOTIDINE 20 MG: 20 TABLET ORAL at 17:38

## 2017-01-01 RX ADMIN — RIFAXIMIN 550 MG: 550 TABLET ORAL at 10:25

## 2017-01-01 RX ADMIN — AMPICILLIN SODIUM 1 G: 1 INJECTION, POWDER, FOR SOLUTION INTRAMUSCULAR; INTRAVENOUS at 03:03

## 2017-01-01 RX ADMIN — AMPICILLIN SODIUM 1 G: 1 INJECTION, POWDER, FOR SOLUTION INTRAMUSCULAR; INTRAVENOUS at 13:26

## 2017-01-01 RX ADMIN — FAMOTIDINE 20 MG: 20 TABLET ORAL at 08:15

## 2017-01-01 RX ADMIN — POTASSIUM PHOSPHATE, MONOBASIC AND POTASSIUM PHOSPHATE, DIBASIC 15 MMOL: 224; 236 INJECTION, SOLUTION INTRAVENOUS at 03:49

## 2017-01-01 RX ADMIN — NYSTATIN: 100000 POWDER TOPICAL at 08:17

## 2017-01-01 RX ADMIN — DEXMEDETOMIDINE HYDROCHLORIDE 0.8 MCG/KG/HR: 4 INJECTION, SOLUTION INTRAVENOUS at 16:10

## 2017-01-01 RX ADMIN — LACTULOSE 20 G: 10 SOLUTION ORAL at 17:37

## 2017-01-01 RX ADMIN — NYSTATIN: 100000 POWDER TOPICAL at 20:09

## 2017-01-01 RX ADMIN — AMPICILLIN SODIUM 1 G: 1 INJECTION, POWDER, FOR SOLUTION INTRAMUSCULAR; INTRAVENOUS at 13:50

## 2017-01-01 RX ADMIN — TAZOBACTAM SODIUM AND PIPERACILLIN SODIUM 3.38 G: 375; 3 INJECTION, SOLUTION INTRAVENOUS at 00:01

## 2017-01-01 RX ADMIN — DIAZEPAM 10 MG: 5 TABLET ORAL at 14:19

## 2017-01-01 RX ADMIN — POTASSIUM & SODIUM PHOSPHATES POWDER PACK 280-160-250 MG 2 PACKET: 280-160-250 PACK at 17:06

## 2017-01-01 RX ADMIN — Medication: at 08:05

## 2017-01-01 RX ADMIN — RIFAXIMIN 550 MG: 550 TABLET ORAL at 21:01

## 2017-01-01 RX ADMIN — AMPICILLIN SODIUM 1 G: 1 INJECTION, POWDER, FOR SOLUTION INTRAMUSCULAR; INTRAVENOUS at 14:02

## 2017-01-01 RX ADMIN — FAMOTIDINE 20 MG: 10 INJECTION, SOLUTION INTRAVENOUS at 21:07

## 2017-01-01 RX ADMIN — METRONIDAZOLE 500 MG: 500 INJECTION, SOLUTION INTRAVENOUS at 18:10

## 2017-01-01 RX ADMIN — CEFTRIAXONE SODIUM 2 G: 2 INJECTION, SOLUTION INTRAVENOUS at 11:51

## 2017-01-01 RX ADMIN — LORAZEPAM 1 MG: 2 INJECTION INTRAMUSCULAR; INTRAVENOUS at 11:50

## 2017-01-01 RX ADMIN — MULTIVITAMIN 15 ML: LIQUID ORAL at 09:59

## 2017-01-01 RX ADMIN — NYSTATIN: 100000 POWDER TOPICAL at 08:06

## 2017-01-01 RX ADMIN — FAMOTIDINE 20 MG: 10 INJECTION, SOLUTION INTRAVENOUS at 08:06

## 2017-01-01 RX ADMIN — NYSTATIN: 100000 POWDER TOPICAL at 20:15

## 2017-01-01 RX ADMIN — TAZOBACTAM SODIUM AND PIPERACILLIN SODIUM 4.5 G: .5; 4 INJECTION, POWDER, LYOPHILIZED, FOR SOLUTION INTRAVENOUS at 06:11

## 2017-01-01 RX ADMIN — LORAZEPAM 1 MG: 2 INJECTION INTRAMUSCULAR; INTRAVENOUS at 14:04

## 2017-01-01 RX ADMIN — HYDROMORPHONE HYDROCHLORIDE 0.5 MG: 1 INJECTION, SOLUTION INTRAMUSCULAR; INTRAVENOUS; SUBCUTANEOUS at 04:51

## 2017-01-01 RX ADMIN — Medication 100 MG: at 09:59

## 2017-01-01 RX ADMIN — RIFAXIMIN 550 MG: 550 TABLET ORAL at 08:06

## 2017-01-01 RX ADMIN — NYSTATIN: 100000 POWDER TOPICAL at 21:57

## 2017-01-01 RX ADMIN — MULTIVITAMIN 15 ML: LIQUID ORAL at 08:06

## 2017-01-01 RX ADMIN — LACTULOSE 20 G: 10 SOLUTION ORAL at 08:15

## 2017-01-01 RX ADMIN — LACTULOSE 20 G: 10 SOLUTION ORAL at 15:40

## 2017-01-01 RX ADMIN — FAMOTIDINE 20 MG: 10 INJECTION, SOLUTION INTRAVENOUS at 20:49

## 2017-01-01 RX ADMIN — Medication: at 20:21

## 2017-01-01 RX ADMIN — FOLIC ACID 1 MG: 1 TABLET ORAL at 08:14

## 2017-01-01 RX ADMIN — LACTULOSE 30 G: 10 SOLUTION ORAL at 18:31

## 2017-01-01 RX ADMIN — METRONIDAZOLE 500 MG: 500 INJECTION, SOLUTION INTRAVENOUS at 10:20

## 2017-01-01 RX ADMIN — RIFAXIMIN 550 MG: 550 TABLET ORAL at 12:38

## 2017-01-01 RX ADMIN — DEXMEDETOMIDINE HYDROCHLORIDE 0.3 MCG/KG/HR: 4 INJECTION, SOLUTION INTRAVENOUS at 10:00

## 2017-01-01 RX ADMIN — DEXMEDETOMIDINE HYDROCHLORIDE 0.4 MCG/KG/HR: 4 INJECTION, SOLUTION INTRAVENOUS at 23:32

## 2017-01-01 RX ADMIN — RIFAXIMIN 550 MG: 550 TABLET ORAL at 08:05

## 2017-01-01 RX ADMIN — NYSTATIN: 100000 POWDER TOPICAL at 08:04

## 2017-01-01 RX ADMIN — SODIUM CHLORIDE 50 ML/HR: 9 INJECTION, SOLUTION INTRAVENOUS at 15:32

## 2017-01-01 RX ADMIN — MULTIVITAMIN 15 ML: LIQUID ORAL at 10:24

## 2017-01-01 RX ADMIN — CEFTRIAXONE SODIUM 2 G: 2 INJECTION, SOLUTION INTRAVENOUS at 11:31

## 2017-01-01 RX ADMIN — FAMOTIDINE 20 MG: 20 TABLET ORAL at 17:06

## 2017-01-01 RX ADMIN — FAMOTIDINE 20 MG: 20 TABLET ORAL at 08:14

## 2017-01-01 RX ADMIN — RIFAXIMIN 550 MG: 550 TABLET ORAL at 08:14

## 2017-01-01 RX ADMIN — FOLIC ACID 1 MG: 1 TABLET ORAL at 08:06

## 2017-01-01 RX ADMIN — AMPICILLIN SODIUM 1 G: 1 INJECTION, POWDER, FOR SOLUTION INTRAMUSCULAR; INTRAVENOUS at 20:20

## 2017-01-01 RX ADMIN — DIAZEPAM 10 MG: 5 TABLET ORAL at 05:13

## 2017-01-01 RX ADMIN — DIAZEPAM 5 MG: 5 TABLET ORAL at 17:03

## 2017-01-01 RX ADMIN — NYSTATIN: 100000 POWDER TOPICAL at 20:26

## 2017-01-01 RX ADMIN — VANCOMYCIN HYDROCHLORIDE 2000 MG: 1 INJECTION, POWDER, LYOPHILIZED, FOR SOLUTION INTRAVENOUS at 18:25

## 2017-01-01 RX ADMIN — Medication 100 MG: at 11:31

## 2017-01-01 RX ADMIN — DEXMEDETOMIDINE HYDROCHLORIDE 1 MCG/KG/HR: 4 INJECTION, SOLUTION INTRAVENOUS at 11:11

## 2017-10-08 PROBLEM — K70.11 ALCOHOLIC HEPATITIS WITH ASCITES: Status: ACTIVE | Noted: 2017-01-01

## 2017-10-08 PROBLEM — I51.89 DIASTOLIC DYSFUNCTION: Status: ACTIVE | Noted: 2017-01-01

## 2017-10-08 PROBLEM — E87.1 HYPONATREMIA: Status: ACTIVE | Noted: 2017-01-01

## 2017-10-08 PROBLEM — R79.89 ELEVATED LACTIC ACID LEVEL: Status: ACTIVE | Noted: 2017-01-01

## 2017-10-08 PROBLEM — R41.82 ALTERED MENTAL STATUS: Status: ACTIVE | Noted: 2017-01-01

## 2017-10-08 PROBLEM — K76.82 HEPATIC ENCEPHALOPATHY (HCC): Status: ACTIVE | Noted: 2017-01-01

## 2017-10-08 PROBLEM — A41.9 SEPSIS (HCC): Status: ACTIVE | Noted: 2017-01-01

## 2017-10-08 PROBLEM — E16.2 HYPOGLYCEMIA: Status: ACTIVE | Noted: 2017-01-01

## 2017-10-08 NOTE — H&P
Critical Care History & Physical    Patient name: Tod Ambrose  CSN: [id number]  MRN: [id number]  : 1950  Today's date: 10/8/2017    Primary Care Physician: No Known Provider    Chief complaint:  confusion    HPI: Mr. Ambrose is a 65 y/o WM who was admitted via the ED today for confusion.  According to his sister, who is an RN here and who he lives with, he had been increasingly confused and weak over the last few days.  She states that he lives in her basement and lost his job about a year ago.  He is noncompliant and does not go for any follow up appts.  He is still drinking alcohol and she is unsure of how much he drinks.  He does drink bourbon, beer and whiskey.  He is also still smoking.  She stated she went down to check on him today and there was stench.  He was incontinent of bowel and bladder and very lethargic.  She called EMS.  In the ED, he had a temp 97.9, P 113. R 16, 178/85 and 95% on 2L.  He was altered.  ABG on 32% had pH 7.43, pCO2 37, pO2 85.  WBC 8.8, Hgb 13.9, HCT 39.1, plts 66, troponin 0.02, Na 128, K 4.2, BUN 41, Cr 1.0, lipase 247, lactate 10.6, , ALT 34, Alk Phos 162.  CT head revealed questionable pontine infarct but there was motion artifact. UDS was negative. He will be admitted into the ICU per the Intensivist service.    PMH:   Past Medical History:   Diagnosis Date   • Alcoholic hepatitis with ascites    • COPD (chronic obstructive pulmonary disease)    • Diastolic dysfunction    • Polycythemia    • Thrombocytopenia      PSH:   Past Surgical History:   Procedure Laterality Date   • PARACENTESIS       PFH:   History reviewed. No pertinent family history.    SH:   Social History     Social History   • Marital status: Single     Spouse name: N/A   • Number of children: N/A   • Years of education: N/A     Social History Main Topics   • Smoking status: Never Smoker   • Smokeless tobacco: None   • Alcohol use Yes       "Comment: daily unknown amount   • Drug use: No   • Sexual activity: Not Asked     Other Topics Concern   • None     Social History Narrative   • None     Allergies:   No Known Allergies    Code Status:  Conditional Code    Home Medications:  No prescriptions prior to admission.     Review of Systems  Negative systems except for what is noted in HPI     Vital Signs:  Blood pressure 166/94, pulse (!) 121, temperature 98.1 °F (36.7 °C), temperature source Axillary, resp. rate 21, height 64\" (162.6 cm), weight 250 lb (113 kg), SpO2 95 %.    Physical Exam:  Constitutional:  Appears well-developed and obese. No distress.   HEENT:  Normocephalic and atraumatic. PERRL  Neck:  Neck supple. No JVD present.   CV: Normal rate, regular rhythm, intact distal pulses.  No gallop, murmur or rub  Pulmonary/Chest: Effort normal. Coarse rhonchi bilaterally.   Abdominal: Soft. +BS.  No distended and no mass. + hernia. Obese. There is no tenderness. Dull to percussion.   Musculoskeletal: Normal muscle tone and strength  Neurological: Lethargic, does not follow commands  Skin: Skin is warm and dry. No rash noted.   Extremities:  No clubbing, edema or cyanosis    Labs:    Results from last 7 days  Lab Units 10/08/17  1140   WBC 10*3/mm3 8.82   HEMOGLOBIN g/dL 13.9   HEMATOCRIT % 39.1   PLATELETS 10*3/mm3 66*       Results from last 7 days  Lab Units 10/08/17  1140   SODIUM mmol/L 128*   POTASSIUM mmol/L 4.2   CHLORIDE mmol/L 84*   CO2 mmol/L 24.0   BUN mg/dL 41*   CREATININE mg/dL 1.00   CALCIUM mg/dL 9.2   BILIRUBIN mg/dL 7.2*   ALK PHOS U/L 162*   ALT (SGPT) U/L 34   AST (SGOT) U/L 121*   GLUCOSE mg/dL 175*     Magnesium   Date Value Ref Range Status   10/08/2017 2.1 1.3 - 2.7 mg/dL Final     Imaging:  CT a/p revealed Stranding identified within the mesenteric root with peripancreatic stranding identified. There are also inflammatory changes and wall thickening of the second and third portion of the duodenum. Correlation to clinical lab " values is recommended. Stones are seen in the gallbladder. A small amount of fluid along the left paracolic gutter. Ventral abdominal wall hernia containing bowel and fat. No definite strangulation.    ECHO: 11/2/14 The study is technically limited due to poor acoustic windows. The left ventricular chamber size is normal. The estimated ejection fraction is 50-55%. Abnormal left ventricular diastolic function is observed.  RA is noted dilated. There is mild mitral regurgitation.  There is moderate tricuspid regurgitation. The right ventricular systolic pressure is estimated to be 40-45 mmHg    ABG:     Results from last 7 days  Lab Units 10/08/17  1100   PH, ARTERIAL pH units 7.433   PO2 ART mm Hg 85.0   PCO2, ARTERIAL mm Hg 37.4   HCO3 ART mmol/L 25.0     Assessment:  Hospital Problem List     * (Principal)Altered mental status    Sepsis    Diastolic dysfunction    Hypoglycemia    H/O Alcoholic hepatitis with ascites    Elevated lactic acid level    Hyponatremia    Hepatic encephalopathy        Plan:   ICU admission  Start Lactulose for hepatic encephalopathy.   Gentle hydration  NPO  Serial lactates  Precedex if needed for agitation later  Repeat lipase in am  Add flagyl for enteritis. Continue Vanc and Zosyn  Procalcitonin  Check INR in order to calculate discriminate function. If > 32, patient may benefit from prednisolone.     KITTY Cesar, LUDA-BC, FNP-BC  Pulmonary & Critical Care Medicine    I have personally seen, interviewed and examined the patient and verified all the key components of the history, physical examination, assessment and plan with KITTY Cesar, LUDA-BC, FNP-BC and reviewed the note, which reflects my changes and contributions.    Time: was greater than 40 minutes.(This excludes time spent performing separately reportable procedures and services).       Vandana V Case, DO  Pulmonary and Critical Care Medicine

## 2017-10-08 NOTE — PROGRESS NOTES
"Pharmacy Consult-Vancomycin Dosing  Tod Ambrose is a  66 y.o. male receiving vancomycin therapy.     Indication: Sepsis  Consulting Provider: Intensivist  ID Consult: No    Goal Trough: 15-20 mcg/mL    Current Antimicrobial Therapy  IV Anti-Infectives       Ordered     Dose/Rate Route Frequency Start Stop      10/08/17 1520  piperacillin-tazobactam (ZOSYN) 4.5 g/100 mL 0.9% NS IVPB (mbp)     Ordering Provider:  KITTY Casas    4.5 g  over 4 Hours Intravenous Every 8 Hours 10/08/17 2200      10/08/17 1520  Pharmacy to dose vancomycin     Ordering Provider:  KITTY Casas     Does not apply Continuous PRN 10/08/17 1519      10/08/17 1135  vancomycin IVPB 2000 mg in 0.9% Sodium Chloride (premix) 500 mL     Ordering Provider:  Ramiro Ruiz MD    2,000 mg Intravenous Once 10/08/17 1137 10/08/17 1238    10/08/17 1135  piperacillin-tazobactam (ZOSYN) 4.5 g/100 mL 0.9% NS IVPB (mbp)     Ordering Provider:  Ramiro Ruiz MD    4.5 g Intravenous Once 10/08/17 1137            Allergies  Allergies as of 10/08/2017   • (No Known Allergies)     Labs    Results from last 7 days   Lab Units 10/08/17  1140   BUN mg/dL 41*   CREATININE mg/dL 1.00       Results from last 7 days   Lab Units 10/08/17  1140   WBC 10*3/mm3 8.82     Evaluation of Dosing     Last Dose Received in the ED/Outside Facility: 2000 mg IV x 1 10/8 @ 1238    Ht - 64\" (162.6 cm)  Wt - 250 lb (113 kg)    Estimated Creatinine Clearance: 84.1 mL/min (by C-G formula based on Cr of 1).    Microbiology and Radiology    Blood and urine cultures in process    Evaluation of Level    Vancomycin trough 10/10 @ 1230.    Assessment/Plan:    Patient received a loading dose of vancomycin 2000 mg IV x 1.  Will start patient on a maintenance dose of vancomycin 1750 mg IV every 24 hours.  Vancomycin trough ordered for 10/10 @ 1230 prior to the 3rd dose. Goal trough = 15-20 mcg/mL.  Monitor renal function, cultures and sensitivities, and clinical " status, and adjust regimen as necessary.  Pharmacy will continue to follow.    Rachana Harris, PharmD  10/8/2017  3:35 PM

## 2017-10-09 PROBLEM — J96.21 ACUTE ON CHRONIC RESPIRATORY FAILURE WITH HYPOXIA AND HYPERCAPNIA (HCC): Status: ACTIVE | Noted: 2017-01-01

## 2017-10-09 PROBLEM — J96.22 ACUTE ON CHRONIC RESPIRATORY FAILURE WITH HYPOXIA AND HYPERCAPNIA (HCC): Status: ACTIVE | Noted: 2017-01-01

## 2017-10-09 PROBLEM — F10.10 ALCOHOL ABUSE: Status: ACTIVE | Noted: 2017-01-01

## 2017-10-09 NOTE — CONSULTS
Continued Stay Note  [facility]     Patient Name: Tod Ambrose  MRN: [id number]  Today's Date: 10/9/2017    Admit Date: 10/8/2017          Discharge Plan       10/09/17 1426    Case Management/Social Work Plan    Additional Comments SW received consult for drinking. SW spoke with pt's nurse who explained that pt is currently confused and is not appropriate at this time to see for etoh resources. SW will continue to follow and see pt when appropriate.      10/09/17 0595    Case Management/Social Work Plan    Plan TBD    Patient/Family In Agreement With Plan yes    Additional Comments Pt has BIPAP on, I met with Tung (sister) she states her brother stay in the basement of her home. He was independent with ADL's and mobility. Pt wear 02 Tung was not sure of the company, pt is not compiant with his 02.  Pt has had [facility] for P[facility] in the past. .              Discharge Codes     None            IDALIA Tomlin

## 2017-10-09 NOTE — PROGRESS NOTES
Intensive Care Follow-up      LOS: 1 day     Mr. Tod Ambrose, 66 y.o. male is followed for: Altered mental status     Subjective - Interval History     Remains obtunded  On low dose Precedex for episodes of agitation  Remains encephalopathic even one Precedex decreased  BiPAP dependent    The patient's relevant past medical, surgical and social history were reviewed and updated in Epic as appropriate.     Objective     Infusions:    dexmedetomidine 0.2-1.5 mcg/kg/hr Last Rate: 0.4 mcg/kg/hr (10/09/17 0804)   norepinephrine 0.02-0.3 mcg/kg/min Last Rate: Stopped (10/08/17 1754)   Pharmacy to dose vancomycin     sodium chloride 100 mL/hr Last Rate: 100 mL/hr (10/08/17 1707)     Medications:    [START ON 10/10/2017] Pharmacy Consult  Does not apply Once   ceftriaxone 2 g Intravenous Q24H   famotidine 20 mg Intravenous Q12H   folic acid 1 mg Oral Daily   lactulose 30 g Oral 4x Daily   metroNIDAZOLE 500 mg Intravenous Q8H   multivitamin and minerals 15 mL Oral Daily   nystatin  Topical Q12H   rifAXIMin 550 mg Oral Q12H   thiamine 100 mg Oral Daily   vancomycin 1,750 mg Intravenous Q24H     Intake/Output       10/08/17 0700 - 10/09/17 0659 10/09/17 0700 - 10/10/17 0659    Intake (ml) 7928.2 553    Output (ml) 595 275    Net (ml) 7333.2 278    Last Weight 250 lb (113 kg) --        Vital Sign Min/Max for last 24 hours  Temp  Min: 97.6 °F (36.4 °C)  Max: 98.5 °F (36.9 °C)   BP  Min: 81/53  Max: 166/94   Pulse  Min: 56  Max: 121   Resp  Min: 14  Max: 22   SpO2  Min: 89 %  Max: 98 %   Flow (L/min)  Min: 2  Max: 6        Physical Exam:   GENERAL: On BiPAP, no overt distress   HEENT: No adenopathy or thyromegaly   LUNGS: Decreased breath sounds bilaterally without wheezes   HEART: Regular rate and rhythm without murmurs   ABDOMEN: Obese, protuberant.  No overt fluid wave.   EXTREMITIES: Trace pitting lower extremity edema, no cyanosis   NEURO/PSYCH: Obtunded.  Will open eyes to stimulation.  Spontaneous will move all  fours      Results from last 7 days  Lab Units 10/09/17  0457 10/08/17  1140   WBC 10*3/mm3 7.12 8.82   HEMOGLOBIN g/dL 12.7* 13.9   PLATELETS 10*3/mm3 68* 66*       Results from last 7 days  Lab Units 10/09/17  0457 10/08/17  1140   SODIUM mmol/L 134 128*   POTASSIUM mmol/L 3.1* 4.2   CO2 mmol/L 28.0 24.0   BUN mg/dL 45* 41*   CREATININE mg/dL 0.80 1.00   MAGNESIUM mg/dL 2.5 2.1   PHOSPHORUS mg/dL 1.2*  --    GLUCOSE mg/dL 107* 175*     Estimated Creatinine Clearance: 105.1 mL/min (by C-G formula based on Cr of 0.8).            Results from last 7 days  Lab Units 10/08/17  1727   PH, ARTERIAL pH units 7.439   PCO2, ARTERIAL mm Hg 48.9*   PO2 ART mm Hg 62.3*     Lab Results   Component Value Date    LACTATE 1.5 10/09/2017          Images: Chest x-ray reveals cardiomegaly and some opacity occasional left base which could be due to effusion, atelectasis, or infiltrate    I reviewed the patient's results and images.     Impression      Hospital Problem List     * (Principal)Altered mental status    Sepsis    Elevated lactic acid level    Hepatic encephalopathy    H/O Alcoholic hepatitis with ascites    Alcohol abuse    Mixed respiratory failure    Diastolic dysfunction    Hypoglycemia    Hyponatremia               Plan        Add Rifaxan  Simplify antibiotics  Follow-up cultures and adjust antibiotics as appropriate  Continue vitamins  Discontinue IV fluids; appears volume resuscitated  Prognosis appears poor     Plan of care and goals reviewed with mulitdisciplinary team at daily rounds   I discussed the patient's findings and my recommendations with nursing staff     Critical Care time spent in direct patient care: 30 minutes (excluding procedure time, if applicable) including high complexity decision making to assess, manipulate, and support vital organ system failure in this individual who has impairment of one or more vital organ systems such that there is a high probability of imminent or life threatening  deterioration in the patient’s condition.    NATHANAEL Peterson MD  Pulmonary and Critical Care Medicine

## 2017-10-09 NOTE — PLAN OF CARE
Problem: Patient Care Overview (Adult)  Goal: Plan of Care Review  Outcome: Ongoing (interventions implemented as appropriate)    10/09/17 3935   Patient Care Overview   Progress no change   Outcome Evaluation   Outcome Summary/Follow up Plan WOC consulted to evaluate groin excoriation. Bilateral groin and scrotum excoriated with several small superficial open wounds; small amount bleeding; scrotum edematous; moist. Area cleansed with skin wipes. Inter-dry applied to bilateral groin regions. Plan Z-guard BID and prn for soiling; Inter-dry to bilateral groin and scrotal area; PT for scrotal wrap. WOC to follow. Please contact WOC as needed for concerns.          Problem: Wound, Traumatic, Nonburn (Adult)  Goal: Signs and Symptoms of Listed Potential Problems Will be Absent or Manageable (Wound, Traumatic, Nonburn)  Outcome: Ongoing (interventions implemented as appropriate)

## 2017-10-09 NOTE — PLAN OF CARE
Problem: Acute Alcohol Withdrawal Syndrome, Risk For/Actual (Adult)  Goal: Signs and Symptoms of Listed Potential Problems Will be Absent or Manageable (Acute Alcohol Withdrawal Syndrome, Risk For/Actual)  Outcome: Ongoing (interventions implemented as appropriate)    Problem: Patient Care Overview (Adult)  Goal: Plan of Care Review  Outcome: Ongoing (interventions implemented as appropriate)    10/09/17 2585   Coping/Psychosocial Response Interventions   Plan Of Care Reviewed With patient;daughter   Patient Care Overview   Progress no change         Problem: Sepsis (Adult)  Goal: Signs and Symptoms of Listed Potential Problems Will be Absent or Manageable (Sepsis)  Outcome: Ongoing (interventions implemented as appropriate)    Problem: Respiratory Insufficiency (Adult)  Goal: Identify Related Risk Factors and Signs and Symptoms  Outcome: Ongoing (interventions implemented as appropriate)  Goal: Acid/Base Balance  Outcome: Ongoing (interventions implemented as appropriate)  Goal: Effective Ventilation  Outcome: Ongoing (interventions implemented as appropriate)    Problem: Wound, Traumatic, Nonburn (Adult)  Goal: Signs and Symptoms of Listed Potential Problems Will be Absent or Manageable (Wound, Traumatic, Nonburn)  Outcome: Ongoing (interventions implemented as appropriate)

## 2017-10-09 NOTE — PROGRESS NOTES
Discharge Planning Assessment  [facility]     Patient Name: Tod Ambrose  MRN: [id number]  Today's Date: 10/9/2017    Admit Date: 10/8/2017          Discharge Needs Assessment       10/09/17 0362    Living Environment    Lives With sibling(s)   Pt lives with his sister Tung    Living Arrangements house    Home Accessibility bed and bath on same level    Transportation Available car;family or friend will provide    Living Environment Comment --   I met with Tung (sister) in room, she states her brother lives in the basement of her home in [facility].    Living Environment    Provides Primary Care For no one, unable/limited ability to care for self    Primary Care Provided By spouse/significant other    Quality Of Family Relationships supportive    Able to Return to Prior Living Arrangements yes    Discharge Needs Assessment    Concerns To Be Addressed basic needs concerns;adjustment to diagnosis/illness concerns    Readmission Within The Last 30 Days no previous admission in last 30 days    Anticipated Changes Related to Illness inability to care for self    Equipment Currently Used at Home oxygen   home o2 2 liters they were not sure of the agency.    Discharge Disposition still a patient    Discharge Contact Information if Applicable Caroline Ambrose (sister)  [phone]            Discharge Plan       10/09/17 6060    Case Management/Social Work Plan    Plan TBD    Patient/Family In Agreement With Plan yes    Additional Comments Pt has BIPAP on, I met with Tung (sister) she states her brother stay in the basement of her home. He was independent with ADL's and mobility. Pt wear 02 Tung was not sure of the company, pt is not compiant with his 02.  Pt has had [facility]  for P.T[facility] in the past. .        Discharge Placement     No information found                Demographic Summary       10/09/17 1350    Referral Information    Admission Type inpatient    Arrived From admitted as an inpatient    Referral  Source admission list    Reason For Consult discharge planning    Record Reviewed clinical discipline documentation;history and physical    Contact Information    Permission Granted to Share Information With     Primary Care Physician Information    Name Per Liseth's daughter pt does not see PCP            Functional Status       10/09/17 0100    Functional Status Prior    Ambulation 0-->independent    Transferring 0-->independent    Toileting 0-->independent    Bathing 0-->independent    Dressing 0-->independent    Eating 0-->independent    Communication 0-->understands/communicates without difficulty    Swallowing 0-->swallows foods/liquids without difficulty    Prior Functional Level Comment --   independent    IADL    Medications independent    Meal Preparation independent    Housekeeping independent    Laundry independent    Shopping independent    Oral Care independent    IADL Comments --   independent    Activity Tolerance    Usual Activity Tolerance excellent            Psychosocial     None            Abuse/Neglect     None            Legal     None            Substance Abuse     None            Patient Forms     None          Macy Logan RN

## 2017-10-09 NOTE — PROGRESS NOTES
"Adult Nutrition  Assessment/PES    Patient Name:  Tod Ambrose  YOB: 1950  MRN: [id number]  Admit Date:  10/8/2017    Assessment Date:  10/9/2017       If require enteral feed, suggest Peptamen AF @80ml/hr          Reason for Assessment       10/09/17 1606    Reason for Assessment    Reason For Assessment/Visit multidisciplinary rounds;identified at risk by screening criteria;TF/PN    Time Spent (min) 60    Diagnosis Diagnosis    Cardiac Diastolic dysfunction    Hepatic Encephalopathy  h/o ascites, hepatitis    Infectious Disease Sepsis    Neurological AMS    Pulmonary/Critical Care COPD;biPAP    Substance Use ETOH;Tobacco        Patient Active Problem List   Diagnosis   • Sepsis   • Altered mental status   • Diastolic dysfunction   • Hypoglycemia   • H/O Alcoholic hepatitis with ascites   • Elevated lactic acid level   • Hyponatremia   • Hepatic encephalopathy   • Alcohol abuse   • Mixed respiratory failure             Anthropometrics       10/09/17 1609    Anthropometrics (Special Considerations)    Height Used for Calculations 1.626 m (5' 4\")    Weight Used for Calculations 113 kg (250 lb)    RD Calculated     RD Calculated BMI (kg/m2) 42.9            Labs/Tests/Procedures/Meds       10/09/17 1609    Labs/Tests/Procedures/Meds    Labs/Tests Review Reviewed;BUN;Creat;K+;Mg++;Phos;NH3;Lactic acid    Medication Review Reviewed, pertinent   NS@100ml, precedex, pepcid, lactulose, rifaximin, MVI, folate, thiamine       Results from last 7 days  Lab Units 10/09/17  0457   SODIUM mmol/L 134   POTASSIUM mmol/L 3.1*   CHLORIDE mmol/L 94*   CO2 mmol/L 28.0   BUN mg/dL 45*   CREATININE mg/dL 0.80   CALCIUM mg/dL 8.4*   BILIRUBIN mg/dL 6.0*   ALK PHOS U/L 142*   ALT (SGPT) U/L 32   AST (SGOT) U/L 127*   GLUCOSE mg/dL 107*              Physical Findings       10/09/17 1610    Physical Findings/Assessment    Additional Documentation Physical Appearance (Group)   pt on Bipap, not awake,  per RN: pt is not " alert enough to eat    Physical Appearance    Skin other (see comments)   per WOC: groin/ scotum excoriated, several small superficial open wounds       WOC: Outcome Summary/Follow up Plan WOC consulted to evaluate groin excoriation. Bilateral groin and scrotum excoriated with several small superficial open wounds; small amount bleeding; scrotum edematous; moist. Area cleansed with skin wipes. Inter-dry applied to bilateral groin regions. Plan Z-guard BID and prn for soiling; Inter-dry to bilateral groin and scrotal area; PT for scrotal wrap. WOC to follow. Please contact WOC as needed for concerns.              Estimated/Assessed Needs       10/09/17 1612    Estimated/Assessed Energy Needs    Energy Need Method Kcal/kg;Morrison-St Jeor    kcal/kg --   14kcal per kg= 1591kcal    Total estimated needs (Morrison St. Jeor) --   MSJ= 1832kcal    Estimated Kcal Range  1591-1832kcal    Estimated/Assessed Protein Needs    Estimated Protein Range 118-148g protein   2-2.5g protein per kg IBW            Nutrition Prescription Ordered       10/09/17 1613    Nutrition Prescription PO    Current PO Diet NPO              Problem/Interventions:        Problem 1       10/09/17 1606    Nutrition Diagnoses Problem 1    Problem 1 Needs Alternate Route    Etiology (related to) --   per clinical status    Signs/Symptoms (evidenced by) NPO                    Intervention Goal       10/09/17 1613    Intervention Goal    General Nutrition support treatment            Nutrition Intervention       10/09/17 1613    Nutrition Intervention    RD/Tech Action Follow Tx progress;Care plan reviewd, consider enteral feed depending on GOC            Nutrition Prescription       10/09/17 1613    Nutrition Prescription EN    Enteral Prescription Enteral begin/change;Enteral to supply    Enteral Route ND    Product Peptamen AF    TF Delivery Method Continuous    Continuous TF Goal Rate (mL/hr) 80 mL/hr    Continuous TF Starting Rate (mL/hr) 25 mL/hr     Continuous TF Goal Volume (mL) 1600 mL based on 20 hour goal volume    New EN Prescription Ordered? No, recommended    EN to Supply    Kcal/Day 1920 Kcal/Day    Protein (gm/day) 121 gm/day    Meet Estimated Kcal Need (%) 105 %    Meet Estimated Protein Need (%) 100 %    TF Free H2O (mL) 1296 mL            Education/Evaluation       10/09/17 1615    Monitor/Evaluation    Monitor Per protocol;I&O;Pertinent labs;Weight;Skin status;Symptoms        Electronically signed by:  Eri Kennedy RD  10/09/17 4:15 PM

## 2017-10-09 NOTE — PLAN OF CARE
Problem: Pressure Ulcer Risk (Antwan Scale) (Adult,Obstetrics,Pediatric)  Goal: Identify Related Risk Factors and Signs and Symptoms  Outcome: Ongoing (interventions implemented as appropriate)  Goal: Skin Integrity  Outcome: Ongoing (interventions implemented as appropriate)    Problem: Acute Alcohol Withdrawal Syndrome, Risk For/Actual (Adult)  Goal: Signs and Symptoms of Listed Potential Problems Will be Absent or Manageable (Acute Alcohol Withdrawal Syndrome, Risk For/Actual)  Outcome: Ongoing (interventions implemented as appropriate)    Problem: Patient Care Overview (Adult)  Goal: Plan of Care Review  Outcome: Ongoing (interventions implemented as appropriate)  Goal: Discharge Needs Assessment  Outcome: Ongoing (interventions implemented as appropriate)    Problem: Sepsis (Adult)  Goal: Signs and Symptoms of Listed Potential Problems Will be Absent or Manageable (Sepsis)  Outcome: Ongoing (interventions implemented as appropriate)    Problem: Respiratory Insufficiency (Adult)  Goal: Identify Related Risk Factors and Signs and Symptoms  Outcome: Ongoing (interventions implemented as appropriate)  Goal: Acid/Base Balance  Outcome: Ongoing (interventions implemented as appropriate)  Goal: Effective Ventilation  Outcome: Ongoing (interventions implemented as appropriate)

## 2017-10-09 NOTE — PLAN OF CARE
Problem: Patient Care Overview (Adult)  Goal: Plan of Care Review    10/09/17 0855   Outcome Evaluation   Outcome Summary/Follow up Plan WOC consulted to evaluate groin excoriation. Bilateral groin and scrotum excoriated with several small superficial open wounds; small amount bleeding; scrotum edematous; moist. Area cleansed with skin wipes. Inter-dry applied to bilateral groin regions. Plan Z-guard BID and prn for soiling; Inter-dry to bilateral groin and scrotal area; PT for scrotal wrap. WOC to follow. Please contact WOC as needed for concerns.

## 2017-10-10 NOTE — PROGRESS NOTES
"Intensive Care Follow-up      LOS: 2 days     Mr. Tod Ambrose, 66 y.o. male is followed for: Altered mental status     Subjective - Interval History     Remains poorly responsive although a little \"lighter\" today  On Precedex for agitation  Remains BiPAP dependent for respiratory support    The patient's relevant past medical, surgical and social history were reviewed and updated in Epic as appropriate.     Objective     Infusions:    dexmedetomidine 0.2-1.5 mcg/kg/hr Last Rate: 0.2 mcg/kg/hr (10/10/17 0657)   Pharmacy to dose vancomycin       Medications:    Pharmacy Consult  Does not apply Once   ceftriaxone 2 g Intravenous Q24H   famotidine 20 mg Oral BID   folic acid 1 mg Oral Daily   lactulose 20 g Oral Q6H   metroNIDAZOLE 500 mg Intravenous Q8H   multivitamin and minerals 15 mL Oral Daily   nystatin  Topical Q12H   rifAXIMin 550 mg Oral Q12H   thiamine 100 mg Oral Daily   vancomycin 1,750 mg Intravenous Q24H     Intake/Output       10/09/17 0700 - 10/10/17 0659 10/10/17 0700 - 10/11/17 0659    Intake (ml) 1785.4 754.9    Output (ml) 1750 150    Net (ml) 35.4 604.9        Vital Sign Min/Max for last 24 hours  Temp  Min: 97.3 °F (36.3 °C)  Max: 98.3 °F (36.8 °C)   BP  Min: 79/48  Max: 131/116   Pulse  Min: 55  Max: 81   Resp  Min: 14  Max: 22   SpO2  Min: 90 %  Max: 99 %   No Data Recorded        Physical Exam:   GENERAL: On BiPAP, no distress   HEENT: No adenopathy or thyromegaly   LUNGS: Decreased breath sounds bilaterally without wheezes   HEART: Regular rate and rhythm with distant heart sounds   ABDOMEN: Obese, protuberant, bowel sounds noted, soft, no fluid wave   EXTREMITIES: Pitting ankle edema, no cyanosis   NEURO/PSYCH: Will open eyes and withdraw symmetrically.  Doesn't follow commands      Results from last 7 days  Lab Units 10/09/17  0457 10/08/17  1140   WBC 10*3/mm3 7.12 8.82   HEMOGLOBIN g/dL 12.7* 13.9   PLATELETS 10*3/mm3 68* 66*       Results from last 7 days  Lab Units 10/10/17  0141 " 10/10/17  0140 10/09/17  0457 10/08/17  1140   SODIUM mmol/L 142  --  134 128*   POTASSIUM mmol/L 2.9*  2.9*  --  3.1* 4.2   CO2 mmol/L 31.0  --  28.0 24.0   BUN mg/dL 43*  --  45* 41*   CREATININE mg/dL 0.70  --  0.80 1.00   MAGNESIUM mg/dL 2.3  --  2.5 2.1   PHOSPHORUS mg/dL 1.9* 2.0* 1.2*  --    GLUCOSE mg/dL 114*  --  107* 175*     Estimated Creatinine Clearance: 105.1 mL/min (by C-G formula based on Cr of 0.7).            Results from last 7 days  Lab Units 10/08/17  1727   PH, ARTERIAL pH units 7.439   PCO2, ARTERIAL mm Hg 48.9*   PO2 ART mm Hg 62.3*     Lab Results   Component Value Date    LACTATE 1.1 10/10/2017          Images: Chest x-ray without overt effusion although left diaphragm and distinct    I reviewed the patient's results and images.     Impression      Hospital Problem List     * (Principal)Altered mental status    Sepsis    Elevated lactic acid level    Hepatic encephalopathy    H/O Alcoholic hepatitis with ascites    Alcohol abuse    Mixed respiratory failure    Diastolic dysfunction    Hypoglycemia    Hyponatremia               Plan        Initiate nutritional support  Continue lactulose and Rifamixin  Continue broad-spectrum empiric antibiotics for now until final culture results  Continue BiPAP  Remains tenuous however hopefully we will see an improvement in neurologic status as ammonia level improves    Discussed at bedside with sister who is her POA     Plan of care and goals reviewed with mulitdisciplinary team at daily rounds   I discussed the patient's findings and my recommendations with family and nursing staff     Critical Care time spent in direct patient care: 35 minutes (excluding procedure time, if applicable) including high complexity decision making to assess, manipulate, and support vital organ system failure in this individual who has impairment of one or more vital organ systems such that there is a high probability of imminent or life threatening deterioration in the  patient’s condition.    NATHANAEL Peterson MD  Pulmonary and Critical Care Medicine

## 2017-10-10 NOTE — PLAN OF CARE
Problem: Pressure Ulcer Risk (Antwan Scale) (Adult,Obstetrics,Pediatric)  Goal: Identify Related Risk Factors and Signs and Symptoms  Outcome: Ongoing (interventions implemented as appropriate)  Goal: Skin Integrity  Outcome: Ongoing (interventions implemented as appropriate)    Problem: Acute Alcohol Withdrawal Syndrome, Risk For/Actual (Adult)  Goal: Signs and Symptoms of Listed Potential Problems Will be Absent or Manageable (Acute Alcohol Withdrawal Syndrome, Risk For/Actual)  Outcome: Ongoing (interventions implemented as appropriate)    Problem: Patient Care Overview (Adult)  Goal: Plan of Care Review  Outcome: Ongoing (interventions implemented as appropriate)    10/10/17 1600 10/10/17 1713   Coping/Psychosocial Response Interventions   Plan Of Care Reviewed With patient --    Patient Care Overview   Progress --  progress toward functional goals is gradual       Goal: Discharge Needs Assessment  Outcome: Ongoing (interventions implemented as appropriate)    Problem: Sepsis (Adult)  Goal: Signs and Symptoms of Listed Potential Problems Will be Absent or Manageable (Sepsis)  Outcome: Ongoing (interventions implemented as appropriate)    Problem: Respiratory Insufficiency (Adult)  Goal: Identify Related Risk Factors and Signs and Symptoms  Outcome: Ongoing (interventions implemented as appropriate)  Goal: Acid/Base Balance  Outcome: Ongoing (interventions implemented as appropriate)  Goal: Effective Ventilation  Outcome: Ongoing (interventions implemented as appropriate)    Problem: Wound, Traumatic, Nonburn (Adult)  Goal: Signs and Symptoms of Listed Potential Problems Will be Absent or Manageable (Wound, Traumatic, Nonburn)  Outcome: Ongoing (interventions implemented as appropriate)

## 2017-10-10 NOTE — PLAN OF CARE
Problem: Pressure Ulcer Risk (Antwan Scale) (Adult,Obstetrics,Pediatric)  Goal: Identify Related Risk Factors and Signs and Symptoms  Outcome: Ongoing (interventions implemented as appropriate)  Goal: Skin Integrity  Outcome: Ongoing (interventions implemented as appropriate)    Problem: Acute Alcohol Withdrawal Syndrome, Risk For/Actual (Adult)  Goal: Signs and Symptoms of Listed Potential Problems Will be Absent or Manageable (Acute Alcohol Withdrawal Syndrome, Risk For/Actual)  Outcome: Ongoing (interventions implemented as appropriate)    Problem: Patient Care Overview (Adult)  Goal: Plan of Care Review  Outcome: Ongoing (interventions implemented as appropriate)  Goal: Discharge Needs Assessment  Outcome: Ongoing (interventions implemented as appropriate)    Problem: Sepsis (Adult)  Goal: Signs and Symptoms of Listed Potential Problems Will be Absent or Manageable (Sepsis)  Outcome: Ongoing (interventions implemented as appropriate)    Problem: Respiratory Insufficiency (Adult)  Goal: Identify Related Risk Factors and Signs and Symptoms  Outcome: Ongoing (interventions implemented as appropriate)  Goal: Acid/Base Balance  Outcome: Ongoing (interventions implemented as appropriate)  Goal: Effective Ventilation  Outcome: Ongoing (interventions implemented as appropriate)    Problem: Wound, Traumatic, Nonburn (Adult)  Goal: Signs and Symptoms of Listed Potential Problems Will be Absent or Manageable (Wound, Traumatic, Nonburn)  Outcome: Ongoing (interventions implemented as appropriate)

## 2017-10-10 NOTE — CONSULTS
Adult Nutrition  Assessment/PES    Patient Name:  Tod Ambrose  YOB: 1950  MRN: [id number]  Admit Date:  10/8/2017    Assessment Date:  10/10/2017    Comments:  Enteral nutrition order set intiated; Peptamen 1.5 at 20ml/hr  advance as tolerated to 60 ml/hr , daily goal is 1200ml, 1 Pro-Stat daily and IVF dc;d H20 at 25 ml/hr; reduced volume feeding provided  w/ presence of ascites . Initial protein load 1.5 gm/kg of IBW advance as tolerated based on NH3 and mental status.          Reason for Assessment       10/10/17 1539    Reason for Assessment    Reason For Assessment/Visit follow up protocol;multidisciplinary rounds;TF/PN    Identified At Risk By Screening Criteria tube feeding or parenteral nutrition    Time Spent (min) 45    Diagnosis --   per notes of this adm              Nutrition/Diet History       10/10/17 1540    Nutrition/Diet History    Reported/Observed By RN;MD    Other Pt still w/ AMS only slightly improved still unable to eat; begin to feed via NG tube               Labs/Tests/Procedures/Meds       10/10/17 1542    Labs/Tests/Procedures/Meds    Labs/Tests Review Reviewed;NH3;K+;Phos    Medication Review Reviewed, pertinent            Physical Findings       10/10/17 1629    Physical Appearance    Overall Physical Appearance on oxygen therapy   BiPap dependent    Tubes nasogastric tube    Skin --   per WOCN notes            Estimated/Assessed Needs       10/10/17 1630    Estimated/Assessed Protein Needs    Weight Used for Protein Calculation 59 kg (130 lb 1.1 oz)   IBW    Protein (gm/kg) 1.5    1.5 Gm Protein (gm) 88.5    Estimated Protein Range 90 gm /d in presences of elev ammonia and hepatic encephalopathy ; advance as mental status and biochemical parameters allow            Nutrition Prescription Ordered       10/10/17 1632    Nutrition Prescription PO    Current PO Diet NPO              Problem/Interventions:        Problem 1       10/10/17 1632    Nutrition Diagnoses Problem  1    Problem 1 Needs Alternate Route    Etiology (related to) Medical Diagnosis    Neurological AMS;Encephalopathy    Pulmonary/Critical Care A/C respiratory failure;biPAP    Signs/Symptoms (evidenced by) NPO;Other (comment)   unable to take po diet                    Intervention Goal       10/10/17 1634    Intervention Goal    General Nutrition support treatment    TF/PN Inititiate TF/PN;Establish TF tolerance            Nutrition Intervention       10/10/17 1634    Nutrition Intervention    RD/Tech Action Follow Tx progress;Care plan reviewd            Nutrition Prescription       10/10/17 1634    Nutrition Prescription EN    Enteral Prescription Enteral begin/change    Enteral Route NG    Product Peptamin 1.5 qi    Modulars Liquid Protein (15 gm/30 mL)    Liquid Protein (15 gm/30 mL) 30 mL/1 packet    Protein Liquid Frequency Daily    TF Delivery Method Continuous    Continuous TF Goal Rate (mL/hr) 60 mL/hr    Continuous TF Starting Rate (mL/hr) 20 mL/hr    Continuous TF Goal Volume (mL) 1200 mL    Continuous TF Starting Volume (mL) 480 mL    Water flush (mL)  25 mL    Water Flush Frequency Per hour    New EN Prescription Ordered? Yes   vo given per Dr. Peterson to begin feeding    EN to Supply    Kcal/Day 1900 Kcal/Day    Protein (gm/day) 96 gm/day    Meet Estimated Kcal Need (%) 104 %    Meet Estimated Protein Need (%) 106 %   calculated on 1.5 gm/kg og IBW - initial protein load    TF Free H2O (mL) 924 mL    Total Free H2O (mL/day) 1524 mL/day            Education/Evaluation       10/10/17 1658    Monitor/Evaluation    Monitor Per protocol;Pertinent labs;TF delivery/tolerance;I&O;Symptoms;Skin status        Electronically signed by:  Dawn Fernandes RD  10/10/17 4:58 PM

## 2017-10-11 NOTE — PLAN OF CARE
Problem: Pressure Ulcer Risk (Antwan Scale) (Adult,Obstetrics,Pediatric)  Goal: Identify Related Risk Factors and Signs and Symptoms  Outcome: Ongoing (interventions implemented as appropriate)  Goal: Skin Integrity  Outcome: Ongoing (interventions implemented as appropriate)    Problem: Acute Alcohol Withdrawal Syndrome, Risk For/Actual (Adult)  Goal: Signs and Symptoms of Listed Potential Problems Will be Absent or Manageable (Acute Alcohol Withdrawal Syndrome, Risk For/Actual)  Outcome: Ongoing (interventions implemented as appropriate)    Problem: Patient Care Overview (Adult)  Goal: Plan of Care Review  Outcome: Ongoing (interventions implemented as appropriate)  Goal: Adult Individualization and Mutuality  Outcome: Ongoing (interventions implemented as appropriate)  Goal: Discharge Needs Assessment  Outcome: Ongoing (interventions implemented as appropriate)    Problem: Sepsis (Adult)  Goal: Signs and Symptoms of Listed Potential Problems Will be Absent or Manageable (Sepsis)  Outcome: Ongoing (interventions implemented as appropriate)    Problem: Respiratory Insufficiency (Adult)  Goal: Identify Related Risk Factors and Signs and Symptoms  Outcome: Ongoing (interventions implemented as appropriate)  Goal: Acid/Base Balance  Outcome: Ongoing (interventions implemented as appropriate)  Goal: Effective Ventilation  Outcome: Ongoing (interventions implemented as appropriate)    Problem: Wound, Traumatic, Nonburn (Adult)  Goal: Signs and Symptoms of Listed Potential Problems Will be Absent or Manageable (Wound, Traumatic, Nonburn)  Outcome: Ongoing (interventions implemented as appropriate)

## 2017-10-11 NOTE — PROGRESS NOTES
Intensive Care Follow-up      LOS: 3 days     Mr. Tod Ambrose, 66 y.o. male is followed for: Altered mental status     Subjective - Interval History     More awake and alert today  We'll follow some commands  Desaturates when off of BiPAP  Still on Precedex drip and low dose norepinephrine    The patient's relevant past medical, surgical and social history were reviewed and updated in Epic as appropriate.     Objective     Infusions:    dexmedetomidine 0.2-1.5 mcg/kg/hr Last Rate: 0.6 mcg/kg/hr (10/11/17 0629)   norepinephrine 0.02-0.3 mcg/kg/min Last Rate: 0.02 mcg/kg/min (10/10/17 1715)     Medications:    ampicillin 1 g Intravenous Q6H   famotidine 20 mg Oral BID   folic acid 1 mg Oral Daily   lactulose 20 g Oral BID   multivitamin and minerals 15 mL Oral Daily   nystatin  Topical Q12H   PRO-STAT 1 packet Nasogastric Daily   rifAXIMin 550 mg Oral Q12H   thiamine 100 mg Oral Daily     Intake/Output       10/10/17 0700 - 10/11/17 0659    Intake (ml) 2288.9    Output (ml) 4410    Net (ml) -2121.1        Vital Sign Min/Max for last 24 hours  Temp  Min: 96.7 °F (35.9 °C)  Max: 98.1 °F (36.7 °C)   BP  Min: 96/43  Max: 139/76   Pulse  Min: 54  Max: 85   Resp  Min: 14  Max: 20   SpO2  Min: 87 %  Max: 98 %   No Data Recorded        Physical Exam:   GENERAL: More alert, no distress   HEENT: No adenopathy or thyromegaly   LUNGS: Decreased breath sounds bilaterally without wheezes   HEART: Regular rate and rhythm with distant heart sounds   ABDOMEN: Bees, protuberant, soft, nontender.  Bowel sounds present   EXTREMITIES: No edema or cyanosis   NEURO/PSYCH: Lethargic but arousable and follows simple commands.  Moves all 4 symmetrically      Results from last 7 days  Lab Units 10/11/17  0426 10/09/17  0457 10/08/17  1140   WBC 10*3/mm3 11.30* 7.12 8.82   HEMOGLOBIN g/dL 13.8 12.7* 13.9   PLATELETS 10*3/mm3 108* 68* 66*       Results from last 7 days  Lab Units 10/11/17  0426 10/10/17  1540 10/10/17  0141  10/09/17  0457    SODIUM mmol/L 146  --  142  --  134   POTASSIUM mmol/L 3.8 3.0* 2.9*  2.9*  --  3.1*   CO2 mmol/L 35.0*  --  31.0  --  28.0   BUN mg/dL 27*  --  43*  --  45*   CREATININE mg/dL 0.70  --  0.70  --  0.80   MAGNESIUM mg/dL 2.0  --  2.3  --  2.5   PHOSPHORUS mg/dL 1.6*  1.6* 1.4* 1.9*  < > 1.2*   GLUCOSE mg/dL 145*  --  114*  --  107*   < > = values in this interval not displayed.  Estimated Creatinine Clearance: 105.1 mL/min (by C-G formula based on Cr of 0.7).            Results from last 7 days  Lab Units 10/08/17  1727   PH, ARTERIAL pH units 7.439   PCO2, ARTERIAL mm Hg 48.9*   PO2 ART mm Hg 62.3*     Lab Results   Component Value Date    LACTATE 1.1 10/10/2017          Images: Chest x-ray reveals low lung volumes and some atelectasis but no consolidation or effusions    I reviewed the patient's results and images.     Impression      Hospital Problem List     * (Principal)Altered mental status    Sepsis    Elevated lactic acid level    Hepatic encephalopathy    H/O Alcoholic hepatitis with ascites    Alcohol abuse    Mixed respiratory failure    Diastolic dysfunction    Hypoglycemia    Hyponatremia               Plan        Continue lactulose but decrease frequency  Reduce Precedex and mobilize  Physical therapy  Continue ampicillin for urinary tract infection   Still remains on stable from a pulmonary and hemodynamic standpoint requiring continued ICU care     Plan of care and goals reviewed with mulitdisciplinary team at daily rounds   I discussed the patient's findings and my recommendations with patient and nursing staff     Critical Care time spent in direct patient care: 30 minutes (excluding procedure time, if applicable) including high complexity decision making to assess, manipulate, and support vital organ system failure in this individual who has impairment of one or more vital organ systems such that there is a high probability of imminent or life threatening deterioration in the patient’s  condition.    NATHANAEL Peterson MD  Pulmonary and Critical Care Medicine

## 2017-10-11 NOTE — PLAN OF CARE
Problem: Patient Care Overview (Adult)  Goal: Plan of Care Review  Outcome: Ongoing (interventions implemented as appropriate)    10/11/17 1330   Coping/Psychosocial Response Interventions   Plan Of Care Reviewed With patient;spouse   Patient Care Overview   Progress no change   Outcome Evaluation   Outcome Summary/Follow up Plan WOC consulted for pressure injury to bridge of nose. Pt has been on Bi-PAP, which was stopped today. Medical Device DTPI noted bridge of nose. Plan to apply Bacitracin BID. If Bi-Pap is used, apply thick foam dressing under mask to avoid pressure. Groin excoriation slightly improved, moist with yeast. Plan to crust groin area with Nystatin powder, stoma powder and barrier spray, then apply Inter-dry dressing to bilateral groin regions. See LDA and orders. JOSE RAUL bed ordered from [facility]. WOC nurse will continue to f/u. Contact WOC nurse with concerns. Thank you.         Problem: Wound, Traumatic, Nonburn (Adult)  Goal: Signs and Symptoms of Listed Potential Problems Will be Absent or Manageable (Wound, Traumatic, Nonburn)  Outcome: Ongoing (interventions implemented as appropriate)    10/11/17 1330   Wound, Traumatic, Nonburn   Problems Assessed (Wound) all   Problems Present (Wound) skin breakdown;undernutrition

## 2017-10-11 NOTE — PROGRESS NOTES
Adult Nutrition  Assessment/PES    Patient Name:  Tod Ambrose  YOB: 1950  MRN: [id number]  Admit Date:  10/8/2017    Assessment Date:  10/11/2017       Change TF to Peptamen AF @80ml/hr, free water @10ml/hr            Reason for Assessment       10/11/17 1215    Reason for Assessment    Reason For Assessment/Visit multidisciplinary rounds;TF/PN;follow up protocol    Time Spent (min) 30    Hepatic Encephalopathy    Infectious Disease Sepsis    Neurological AMS    Pulmonary/Critical Care COPD;biPAP    Substance Use ETOH;Tobacco          Patient Active Problem List   Diagnosis   • Sepsis   • Altered mental status   • Diastolic dysfunction   • Hypoglycemia   • H/O Alcoholic hepatitis with ascites   • Elevated lactic acid level   • Hyponatremia   • Hepatic encephalopathy   • Alcohol abuse   • Mixed respiratory failure             Labs/Tests/Procedures/Meds       10/11/17 1216    Labs/Tests/Procedures/Meds    Labs/Tests Review Reviewed;BUN;Creat;NH3 47; Mg++;Phos;C-React PRO;Pre Albumin    Medication Review Reviewed, pertinent   lactulose, rifaximin, abx, pepcid, MVI, folate, thiamine            Results from last 7 days  Lab Units 10/11/17  0426   SODIUM mmol/L 146   POTASSIUM mmol/L 3.8   CHLORIDE mmol/L 105   CO2 mmol/L 35.0*   BUN mg/dL 27*   CREATININE mg/dL 0.70   CALCIUM mg/dL 8.5*   BILIRUBIN mg/dL 5.4*   ALK PHOS U/L 188*   ALT (SGPT) U/L 34   AST (SGOT) U/L 144*   GLUCOSE mg/dL 145*           Physical Findings       10/11/17 1217    Physical Findings/Assessment    Additional Documentation --   pt resting in bed on bipap, per RN: pt more alert today, talking, oriented to self, tolerating TF    Physical Appearance    Tubes nasogastric tube    Skin other (see comments)   scotal edema, groin/ scotum- several superficial open wounds            Estimated/Assessed Needs       10/11/17 1222    Estimated/Assessed Energy Needs    kcal/kg --   14kcal per kg= 1591kcal    Total estimated needs ([place]  [place]) --   MSJ= 1832kcal    Estimated Kcal Range  1591-1832kcal    Estimated/Assessed Protein Needs    Protein (gm/kg) --   1.2g protein per kg actual body weight = 136g protein    Estimated Protein Range 118-148g protein   2-2.5g protein per kg IBW            Nutrition Prescription Ordered       10/11/17 1219    Nutrition Prescription PO    Current PO Diet NPO    Nutrition Prescription EN    Enteral Route NG    Product Peptamen 1.5    Modulars Liquid Protein (15 gm/30 mL)    Liquid Protein (15 gm/30 mL) 30 mL/1 packet    Protein Liquid Frequency Daily    TF Delivery Method Continuous    Continuous TF Goal Rate (mL/hr) 60 mL/hr    Continuous TF Current Rate (mL/hr) 60 mL/hr    Continuous TF Goal Volume (mL) 1200 mL    Water flush (mL)  25 mL    Water Flush Frequency Per hour            Evaluation of Received Nutrient/Fluid Intake       10/11/17 1219    EN Evaluation    EN Average Volume Delivered (mL/day) 306 mL/day   TF initiated yetserday night    % Goal Volume  26 %            Evaluation of Prescribed Nutrient/Fluid Intake       10/11/17 1220    Evaluation of Prescribed Nutrient/Fluid Intake    Nutrition Prescribed Calorie Evaluation;Protein Evaluation;Fluid Evaluation    Calories at Prescribed Goal    Enteral Calories (kcal) 1900    Total Calories (kcal) 1900    % Kcal Needs 104    Protein at Prescribed Goal    Enteral Protein (gm) 97    Total Protein (gm) 97    % Protein Needs 82    Fluid at Prescribed Goal    Enteral  Fluid (mL) 924    Free Water Flush Fluid (mL) 1424    Total Fluid Intake (mL) 2348          Problem/Interventions:        Problem 1       10/11/17 1215    Nutrition Diagnoses Problem 1    Problem 1 Needs Alternate Route    Etiology (related to) --   per clinical status, AMS    Signs/Symptoms (evidenced by) NPO                    Intervention Goal       10/11/17 1221    Intervention Goal    General Nutrition support treatment    TF/PN Adjust TF/PN            Nutrition Intervention       10/11/17  1221    Nutrition Intervention    RD/Tech Action Follow Tx progress;Care plan reviewd            Nutrition Prescription       10/11/17 1221    Nutrition Prescription EN    Enteral Prescription Enteral begin/change    Enteral Route NG    Product Peptamen AF    Continuous TF Goal Rate (mL/hr) 80 mL/hr    Continuous TF Starting Rate (mL/hr) 60 mL/hr    Continuous TF Goal Volume (mL) 1600 mL based on 20 hour goal volume    Water flush (mL)  10 mL    Water Flush Frequency Per hour    New EN Prescription Ordered? Yes    EN to Supply    Kcal/Day 1920 Kcal/Day    Protein (gm/day) 121 gm/day    Meet Estimated Kcal Need (%) 105 %    Meet Estimated Protein Need (%) 100 %    TF Free H2O (mL) 1296 mL    Total Free H2O (mL/day) 1496 mL/day            Education/Evaluation       10/11/17 1222    Monitor/Evaluation    Monitor Per protocol;I&O;Supplement intake;Pertinent labs;TF delivery/tolerance;Skin status;Symptoms        Electronically signed by:  Eri Kennedy RD  10/11/17 12:26 PM

## 2017-10-12 NOTE — MBS/VFSS/FEES
Acute Care - Speech Language Pathology   Swallow Initial Evaluation [facility]   Clinical Swallow Evaluation  & Fiberoptic Endoscopic Evaluation of Swallowing (FEES)     Patient Name: Tod Ambrose  : 1950  MRN: [id number]  Today's Date: 10/12/2017  Onset of Illness/Injury or Date of Surgery Date: 10/11/17  Date of Referral to SLP: (P) 10/12/17         Admit Date: 10/8/2017    Visit Dx:     ICD-10-CM ICD-9-CM   1. Sepsis, due to unspecified organism A41.9 038.9     995.91   2. Lactic acidosis E87.2 276.2   3. Impaired functional mobility, balance, gait, and endurance Z74.09 V49.89   4. Dysphagia, unspecified type R13.10 787.20     Patient Active Problem List   Diagnosis   • Sepsis   • Altered mental status   • Diastolic dysfunction   • Hypoglycemia   • H/O Alcoholic hepatitis with ascites   • Elevated lactic acid level   • Hyponatremia   • Hepatic encephalopathy   • Alcohol abuse   • Mixed respiratory failure     Past Medical History:   Diagnosis Date   • Alcoholic hepatitis with ascites    • COPD (chronic obstructive pulmonary disease)    • Diastolic dysfunction    • Polycythemia    • Thrombocytopenia      Past Surgical History:   Procedure Laterality Date   • PARACENTESIS            SWALLOW EVALUATION (last 72 hours)      Swallow Evaluation       10/12/17 1100                Rehab Evaluation    Document Type (P)  evaluation  -AS        Subjective Information (P)  no complaints;agree to therapy  -AS        General Information    Patient Profile Review (P)  yes  -AS        Onset of Illness/Injury (P)  10/08/17  -AS        Subjective Patient Observations (P)  Pt alert and cooperative. Family present @ bedside.   -AS        Pertinent History Of Current Problem (P)  Admit w/ altered mental status, mixed respiratiory failure. Currently on high flow nasal canula.   -AS        Current Diet Limitations (P)  NPO  -AS        Precautions/Limitations, Vision (P)  WFL;other (see comments)   for purposes of eval   -AS         Precautions/Limitations, Hearing (P)  WFL;other (see comments)   for purposes of eval   -AS        Prior Level of Function- Communication (P)  functional in all spheres  -AS        Prior Level of Function- Swallowing (P)  no diet consistency restrictions  -AS        Plans/Goals Discussed With (P)  patient and family;agreed upon  -AS        Barriers to Rehab (P)  none identified  -AS        Clinical Impression    Patient's Goals For Discharge (P)  return to PO diet  -AS        Family Goals For Discharge (P)  patient able to return to PO diet  -AS        SLP Swallowing Diagnosis (P)  moderate dysphagia;severe dysphagia;pharyngeal dysfunction  -AS        Rehab Potential/Prognosis, Swallowing (P)  good, to achieve stated therapy goals  -AS        Criteria for Skilled Therapeutic Interventions Met (P)  skilled criteria for dysphagia intervention met  -AS        Therapy Frequency (P)  5 times/wk  -AS        Predicted Duration Therapy Interv (days) (P)  until discharge  -AS        SLP Diet Recommendation (P)  NPO: unsafe for food/liquid intake  -AS        Recommended Diagnostics (P)  reassess via clinical swallow (non-instrumental exam);other (see comments)   to determine instrumental readiness   -AS        SLP Rec. for Method of Medication Administration (P)  meds crushed in pudding/applesauce   only if alert. Discontinue if pt coughing.   -AS        Monitor For Signs Of Aspiration (P)  cough  -AS        Anticipated Discharge Disposition (P)  other (see comments)   unknown at this time  -AS        Demonstrates Need for Referral to Another Service (P)  other (see comments)   may consider further esophageal workup   -AS        Pain Assessment    Pain Assessment (P)  No/denies pain  -AS        Cognitive Assessment/Intervention    Current Cognitive/Communication Assessment (P)  functional  -AS        Oral Motor Structure and Function    Oral Motor Anatomy and Physiology (P)  patient demonstrates anatomy and physiology  that is WNL  -AS        Dentition Assessment (P)  present and adequate  -AS        Secretion Management (P)  WNL/WFL  -AS        Mucosal Quality (P)  dry  -AS        Volitional Swallow (P)  no difficulties initiating volitional swallow  -AS        Volitional Cough (P)  no difficulties initiating volitional cough  -AS        Oral Musculature General Assessment (P)  WFL (within functional limits)  -AS        General Feeding/Swallowing Observations    Current Feeding Method (P)  NPO  -AS        Respiratory Support Currently in Use (P)  nasal cannula in use;other (see comments)   high flow nasal canula in use  -AS        Observations of Self Feeding Skills (P)  appropriate self feeding skills observed  -AS        Observations of Posture During Feeding (P)  upright in bed  -AS        Clinical Swallow Exam    Mode of Presentation (P)  fed by clinician;self fed;cup;spoon;straw  -AS        Oral Residue (P)  cohesive solid:;tongue residue;other (see comments)   likely 2' dryness   -AS        Oral Phase Results (P)  intact oral phase without signs of dysfunction  -AS        Pharyngeal Phase Results (P)  sign/symptoms of pharyngeal impairment;cough  -AS        Summary of Clinical Exam (P)  Clinical dysphagia eval complete. Pt alert and cooperative. Pt currently on high flow nasal canula. Oral motor and structure seemingly WFL. Trialed thin via spoon/cup/straw, nectar thick via cup, puree, and solid. Overt clinical s/s aspiration of thin c/b immediate coughing. Delayed coughing w/ puree after being pushed. No overt clinical s/s aspiration w/ nectar thick and solid. REC: NPO. SLP will f/u w/ FEES to r/o pharyngeal dysphagia.   -AS        Fiberoptic Endoscopic Swallowing Exam    Risks/Benefits Reviewed (P)  risks/benefits explained;patient;agreed to eval  -AS        Positioning Needs for Swallow Exam (P)  upright in chair  -AS        Nasal Entry, Topical Anesthetic (P)  right:;nostril entered using no topical anesthetic  -AS         Anatomy and Physiology    Velopharyngeal (P)  WFL  -AS        Base of Tongue (P)  symmetrical  -AS        Epiglottis (P)  WFL  -AS        Laryngeal Function Breathing (P)  symmetrical  -AS        Laryngeal Function Phonation (P)  symmetrical  -AS        Laryngeal Function Breath Hold (P)  TVF contact  -AS        Secretions (P)  1- Secretions present around the laryngeal vestibule  -AS        Secretion Description (P)  thick;discolored  -AS        Ice Chips (P)  partially cleared secretions  -AS        Spontaneous Swallow (P)  frequency adequate  -AS        Sensory (P)  reduced sensation  -AS        Anatomy Hypopharynx    Observation Anatomic Considerations (P)  other (see comments)   R-side pharyngeal wall prominence   -AS        FEES    Mode of Presentation (P)  thin:;nectar:;pudding:;fed by clinician;cup;spoon;straw  -AS        Pharyngeal Phase Impairment (P)  thin:;pudding:;bolus to pyriforms before initiation of response;tongue base retraction reduced;reduced pharyngeal wall contraction  -AS        Post Swallow Residue (P)  nectar:;residue present in valleculae;pudding:;other (see comments)   diffuse residue w/ pudding  -AS        Rosenbek's Scale (P)  thin:;nectar:;pudding:;1-->Level 1   Asp Level 6 2/ retrograde flow  -AS        Response to Aspiration (P)  productive reflexive cough  -AS        Pharyngeal Phase Results (P)  impaired pharyngeal phase of swallowing  -AS        FEES Summary (P)  FEES complete. Moderate-severe pharyngeal dysphagia. Pt lethargic and difficulty following commands. Pt presented w/ intermittent cough at baseline. Trialed thin via spoon/cup/straw, nectar thick via straw, and pudding. Diffuse thick secretions within vestibule area prior to trials, which partially cleared w/ ice chip X1. Significant delay (greater than 60 sec.) to the pyriforms w/ thin before initiation of the swallow. No pen/asp of thin initially; however, pt at great risk for asp given significant delay and  lethargy. Aspiration from retrograde flow w/ thins, which pt responded w/ by coughing; however, an intermittent cough was noted at baseline. Timing improved w/ pudding; however, pt presented w/ delay to the pyfiroms as pt fatigued. No pen/asp w/ pudding or nectar thick. Mild amount of residue present w/ nectar thick in valleculae and diffuse residue present w/ pudding, which partially w/ consecutive spontaneous swallow 2' reduced base of tongue and reduced pharyngeal wall contraction. REC: NPO. Meds crushed in applesauce/pudding only when pt is alert (discontinue if coughing noted). SLP will f/u in AM to re-evaluate if pt alert.    -AS        FEES Swallow Recommendations    Oral Care (P)  oral care with toothbrush and dentifrice BID and PRN  -AS        Recommended Diet (P)  NPO: unsafe for food/liquid intake  -AS        Swallow Recommendations    Oral Care (P)  oral care with toothbrush and dentifrice BID and PRN  -AS        Recommended Diet (P)  NPO: unsafe for food/liquid intake  -AS        Dysphagia Treatment Objectives and Progress    Dysphagia Treatment Objectives (P)  Improve tongue base & pharyngeal wall squeeze;Other 1  -AS        Improve tongue base & pharyngeal wall squeeze    To improve tongue base & pharyngeal wall squeeze, patient will: (P)  Complete tongue base retraction;Complete effortful swallow;80%;with inconsistent cues  -AS        Status: Improve tongue base & pharyngeal wall squeeze (P)  New  -AS        Tongue Base/Pharyngeal Wall Squeeze Progress (P)  discontinue achieved  -AS        Dysphagia Other 1    Dysphagia Other 1 Objective (P)  Pt will tolerate PO trials w/o s/s aspiration w/ 100% acc w/o cues.   -AS        Status: Dysphagia Other 1 (P)  New  -AS        Dysphagia Other 1 Progress (P)  continue to address  -AS          User Key  (r) = Recorded By, (t) = Taken By, (c) = Cosigned By    Initials Name Effective Dates    AS Estrella Houser, Speech Therapy Student 08/24/17 -          EDUCATION  The patient has been educated in the following areas:   Dysphagia (Swallowing Impairment) Oral Care/Hydration NPO rationale.    SLP Recommendation and Plan  SLP Swallowing Diagnosis: (P) moderate dysphagia, severe dysphagia, pharyngeal dysfunction  SLP Diet Recommendation: (P) NPO: unsafe for food/liquid intake     SLP Rec. for Method of Medication Administration: (P) meds crushed in pudding/applesauce (only if alert. Discontinue if pt coughing. )  Monitor For Signs Of Aspiration: (P) cough  Recommended Diagnostics: (P) reassess via clinical swallow (non-instrumental exam), other (see comments) (to determine instrumental readiness )  Criteria for Skilled Therapeutic Interventions Met: (P) skilled criteria for dysphagia intervention met  Anticipated Discharge Disposition: (P) other (see comments) (unknown at this time)  Rehab Potential/Prognosis, Swallowing: (P) good, to achieve stated therapy goals  Therapy Frequency: (P) 5 times/wk        Demonstrates Need for Referral to Another Service: (P) other (see comments) (may consider further esophageal workup )    Plan of Care Review  Plan Of Care Reviewed With: (P) patient  Progress: (P)  (eval)  Outcome Summary/Follow up Plan: (P) Clinical dysphagia eval complete. Pt alert and cooperative. Pt currently on high flow nasal canula. Oral motor and structure seemingly WFL. Trialed thin via spoon/cup/straw, nectar thick via cup, puree, and solid. Overt clinical s/s aspiration of thin c/b immediate coughing. Delayed coughing w/ puree after being pushed. No overt clinical s/s aspiration w/ nectar thick and solid. REC: NPO. SLP will f/u w/ FEES to r/o pharyngeal dysphagia. FEES complete. Moderate-severe pharyngeal dysphagia. Pt lethargic and difficulty following commands. Pt presented w/ intermittent cough at baseline. Trialed thin via spoon/cup/straw, nectar thick via straw, and pudding. Diffuse thick secretions within vestibule area prior to trials, which  partially cleared w/ ice chip X1. Significant delay (greater than 60 sec.) to the pyriforms w/ thin before initiation of the swallow. No pen/asp of thin initially; however, pt at great risk for asp given significant delay and lethargy. Aspiration from retrograde flow w/ thins, which pt responded w/ by coughing; however, an intermittent cough was noted at baseline. Timing improved w/ pudding; however, pt presented w/ delay to the pyfiroms as pt fatigued. No pen/asp w/ pudding or nectar thick. Mild amount of residue present w/ nectar thick in valleculae and diffuse residue present w/ pudding, which partially w/ consecutive spontaneous swallow 2' reduced base of tongue and reduced pharyngeal wall contraction. REC: NPO. Meds crushed in applesauce/pudding only when pt is alert (discontinue if coughing noted). SLP will f/u in AM to re-evaluate if pt alert.            IP SLP Goals       10/12/17 1552          Begin to Take Some PO Safely    Begin to Take Some PO Safely- SLP, Date Established (P)  10/12/17  -AS      Begin to Take Some PO Safely- SLP, Time to Achieve (P)  by discharge  -AS      Begin to Take Some PO Safely- SLP, Date Goal Reviewed (P)  10/12/17  -AS        User Key  (r) = Recorded By, (t) = Taken By, (c) = Cosigned By    Initials Name Provider Type    AS Estrella Houser Speech Therapy Student Speech Therapy Student               Time Calculation:         Time Calculation- SLP       10/12/17 1556          Time Calculation- SLP    SLP Start Time (P)  1100  -AS      SLP Received On (P)  10/12/17  -AS        User Key  (r) = Recorded By, (t) = Taken By, (c) = Cosigned By    Initials Name Provider Type    AS Estrella Houser Speech Therapy Student Speech Therapy Student          Therapy Charges for Today     Code Description Service Date Service Provider Modifiers Qty    [id number]  ST EVAL ORAL PHARYNG SWALLOW 4 10/12/2017 Estrella Houser Speech Therapy Student GN 1    [id number]  ST FIBEROPTIC ENDO EVAL SWALL 8  10/12/2017 Estrella Houser, Speech Therapy Student GN 1               Estrella Houser, Speech Therapy Student  10/12/2017

## 2017-10-12 NOTE — PROGRESS NOTES
Adult Nutrition  Assessment/PES    Patient Name:  Tod Ambrose  YOB: 1950  MRN: [id number]  Admit Date:  10/8/2017       Pt failed FEES today, SLP to re-evaluate swallowing tomorrow, if pt fails, suggest place keofeed, resume enteral feed    Noted hypernatremia, currently has D5W @50ml/hr    Consider increasing enteral free water to 50ml/hr if still hypernatremic       Assessment Date:  10/12/2017          Reason for Assessment       10/12/17 1719    Reason for Assessment    Reason For Assessment/Visit follow up protocol;TF/PN;multidisciplinary rounds    Time Spent (min) 30    Hepatic Encephalopathy    Infectious Disease Sepsis    Pulmonary/Critical Care COPD    Substance Use Tobacco;ETOH        Patient Active Problem List   Diagnosis   • Sepsis   • Altered mental status   • Diastolic dysfunction   • Hypoglycemia   • H/O Alcoholic hepatitis with ascites   • Elevated lactic acid level   • Hyponatremia   • Hepatic encephalopathy   • Alcohol abuse   • Mixed respiratory failure               Labs/Tests/Procedures/Meds       10/12/17 1720    Labs/Tests/Procedures/Meds    Labs/Tests Review Reviewed;BUN;Creat;K+;Na+ 149;Mg++;Phos;NH3    Medication Review Reviewed, pertinent   lactulose, rifaximin, MVI, folate, thiamine, abx, pepcid, D5W @50ml/hr       SLP: Outcome Summary/Follow up Plan 10-12-17 Clinical dysphagia eval complete. Pt alert and cooperative. Pt currently on high flow nasal canula. Oral motor and structure seemingly WFL. Trialed thin via spoon/cup/straw, nectar thick via cup, puree, and solid. Overt clinical s/s aspiration of thin c/b immediate coughing. Delayed coughing w/ puree after being pushed. No overt clinical s/s aspiration w/ nectar thick and solid.  REC: NPO. SLP will f/u w/ FEES to r/o pharyngeal dysphagia.       FEES complete. Moderate-severe pharyngeal dysphagia. Pt lethargic and difficulty following commands. Pt presented w/ intermittent cough at baseline. Trialed thin via  spoon/cup/straw, nectar thick via straw, and pudding. Diffuse thick secretions within vestibule area prior to trials, which partially cleared w/ ice chip X1. Significant delay (greater than 60 sec.) to the pyriforms w/ thin before initiation of the swallow. No pen/asp of thin initially; however, pt at great risk for asp given significant delay and lethargy. Aspiration from retrograde flow w/ thins, which pt responded w/ by coughing; however, an intermittent cough was noted at baseline. Timing improved w/ pudding; however, pt presented w/ delay to the pyfiroms as pt fatigued. No pen/asp w/ pudding or nectar thick. Mild amount of residue present w/ nectar thick in valleculae and diffuse residue present w/ pudding, which partially w/ consecutive spontaneous swallow 2' reduced base of tongue and reduced pharyngeal wall contraction.   REC: NPO. Meds crushed in applesauce/pudding only when pt is alert (discontinue if coughing noted). SLP will f/u in AM to re-evaluate if pt alert.               Results from last 7 days  Lab Units 10/12/17  0409   SODIUM mmol/L 149*   POTASSIUM mmol/L 3.7   CHLORIDE mmol/L 108   CO2 mmol/L 36.0*   BUN mg/dL 26*   CREATININE mg/dL 0.60   CALCIUM mg/dL 8.1*   BILIRUBIN mg/dL 3.8*   ALK PHOS U/L 176*   ALT (SGPT) U/L 33   AST (SGOT) U/L 112*   GLUCOSE mg/dL 126*           Physical Findings       10/12/17 1721    Physical Findings/Assessment    Additional Documentation --   pt alert, sitting up in chair, on HFNC, much more alert today, pleasant, talking, per RN: pt more oriented, follows commands, did pull out ngt 2x last night + this am, has nasal trauma, failed dyphagia eval, plan for SLP to come back tomorrow and re-assess    Physical Appearance    Skin pressure ulcer(s)   sDTI- NARE, scrotal edema, several superficial open wounds WOC following        WOC: Outcome Summary/Follow up Plan 10-11-17 WOC consulted for pressure injury to bridge of nose. Pt has been on Bi-PAP, which was stopped  today. Medical Device DTPI noted bridge of nose. Plan to apply Bacitracin BID. If Bi-Pap is used, apply thick foam dressing under mask to avoid pressure. Groin excoriation slightly improved, moist with yeast. Plan to crust groin area with Nystatin powder, stoma powder and barrier spray, then apply Inter-dry dressing to bilateral groin regions. See LDA and orders. JOSE RAUL bed ordered from [facility]. WO nurse will continue to f/u. Contact [facility] nurse with concerns. Thank you.               Nutrition Prescription Ordered       10/12/17 1723    Nutrition Prescription PO    Current PO Diet NPO    Nutrition Prescription EN    Enteral Route --   no access at present    Product Peptamen AF    TF Delivery Method Continuous    Continuous TF Goal Rate (mL/hr) 80 mL/hr    Continuous TF Current Rate (mL/hr) --   TF held    Continuous TF Goal Volume (mL) 1600 mL    Water flush (mL)  10 mL    Water Flush Frequency Per hour            Evaluation of Received Nutrient/Fluid Intake       10/12/17 1724    EN Evaluation    EN Average Volume Delivered (mL/day) --   TF product changed yesterday, insufficient data            Problem/Interventions:        Problem 1       10/12/17 1719    Nutrition Diagnoses Problem 1    Problem 1 Needs Alternate Route    Etiology (related to) --   per clinical status, AMS    Signs/Symptoms (evidenced by) NPO                    Intervention Goal       10/12/17 1725    Intervention Goal    General Nutrition support treatment    PO Initiate feeding   if able to pass SLP eval    TF/PN Inititiate TF/PN   if fails SLP eval            Nutrition Intervention       10/12/17 1725    Nutrition Intervention    RD/Tech Action Follow Tx progress;Care plan reviewd            Nutrition Prescription       10/12/17 1725    Nutrition Prescription EN    Enteral Prescription Enteral begin/change    Water flush (mL)  50 mL    Water Flush Frequency Per hour    New EN Prescription Ordered? No, recommended    EN to Supply    TF Free H2O (mL)  1296 mL    Total Free H2O (mL/day) 2296 mL/day            Education/Evaluation       10/12/17 1726    Monitor/Evaluation    Monitor Per protocol;I&O;Pertinent labs;TF delivery/tolerance;Skin status;Symptoms        Electronically signed by:  Eri Kennedy RD  10/12/17 5:26 PM

## 2017-10-12 NOTE — THERAPY EVALUATION
Acute Care - Physical Therapy Initial Evaluation  [facility]     Patient Name: Tod Ambrose  : 1950  MRN: [id number]  Today's Date: 10/12/2017   Onset of Illness/Injury or Date of Surgery Date: 10/11/17  Date of Referral to PT: 10/11/17  Referring Physician: MD Nicholas      Admit Date: 10/8/2017     Visit Dx:    ICD-10-CM ICD-9-CM   1. Sepsis, due to unspecified organism A41.9 038.9     995.91   2. Lactic acidosis E87.2 276.2   3. Impaired functional mobility, balance, gait, and endurance Z74.09 V49.89     Patient Active Problem List   Diagnosis   • Sepsis   • Altered mental status   • Diastolic dysfunction   • Hypoglycemia   • H/O Alcoholic hepatitis with ascites   • Elevated lactic acid level   • Hyponatremia   • Hepatic encephalopathy   • Alcohol abuse   • Mixed respiratory failure     Past Medical History:   Diagnosis Date   • Alcoholic hepatitis with ascites    • COPD (chronic obstructive pulmonary disease)    • Diastolic dysfunction    • Polycythemia    • Thrombocytopenia      Past Surgical History:   Procedure Laterality Date   • PARACENTESIS            PT ASSESSMENT (last 72 hours)      PT Evaluation       10/12/17 1312 10/12/17 1100    Rehab Evaluation    Document Type evaluation  -LS (P)  evaluation  -AS    Subjective Information agree to therapy;no complaints  -LS (P)  no complaints;agree to therapy  -AS    Patient Effort, Rehab Treatment good  -LS     General Information    Patient Profile Review yes  -LS     Onset of Illness/Injury or Date of Surgery Date 10/11/17  -LS     Referring Physician MD Nicholas  -LS     Pertinent History Of Current Problem To ED with confusion; found to have hepatic encephalopathy. PMH significant for ETOH and noncompliance.   -LS     Precautions/Limitations fall precautions;oxygen therapy device and L/min;other (see comments)   FMS; large abd hernia  -LS     Prior Level of Function independent:;gait;transfer;ADL's;bathing;dressing  -LS     Equipment Currently  Used at Home none  -LS     Plans/Goals Discussed With patient;agreed upon  -LS     Risks Reviewed patient:;LOB;dizziness;increased discomfort;change in vital signs  -LS     Benefits Reviewed patient:;improve function;increase independence;increase strength;increase knowledge  -LS     Barriers to Rehab cognitive status  -LS     Living Environment    Lives With sibling(s)  -LS     Living Arrangements house  -LS     Home Accessibility stairs within home  -LS     Number of Stairs Within Home 12   with HR  -LS     Living Environment Comment Pt lives in the basement of his sister's home.   -LS     Clinical Impression    Date of Referral to PT 10/11/17  -LS     PT Diagnosis impaired functional mobility, balance, gait  -LS     Patient/Family Goals Statement return to PLOF  -LS     Criteria for Skilled Therapeutic Interventions Met yes;treatment indicated  -LS     Rehab Potential good, to achieve stated therapy goals  -LS     Vital Signs    Pre Systolic BP Rehab 104  -LS     Pre Treatment Diastolic BP 54  -LS     Post Systolic BP Rehab 91  -LS     Post Treatment Diastolic BP 62  -LS     Pretreatment Resp Rate (breaths/min) 75  -LS     Posttreatment Resp Rate (breaths/min) 78  -LS     Pre SpO2 (%) 96  -LS     O2 Delivery Pre Treatment supplemental O2   highflow NC  -LS     Post SpO2 (%) 97  -LS     O2 Delivery Post Treatment supplemental O2  -LS     Pre Patient Position Supine  -LS     Intra Patient Position Standing  -LS     Post Patient Position Sitting  -LS     Pain Assessment    Pain Assessment 0-10  -LS (P)  No/denies pain  -AS    Pain Score 0  -LS     Post Pain Score 0  -LS     Cognitive Assessment/Intervention    Current Cognitive/Communication Assessment impaired  -LS (P)  functional  -AS    Orientation Status oriented to;person;place;disoriented to;time  -LS     Follows Commands/Answers Questions able to follow single-step instructions;75% of the time;needs cueing;needs increased time;needs repetition  -LS      Personal Safety moderate impairment;decreased awareness, need for assist;decreased awareness, need for safety;decreased insight to deficits  -LS     Personal Safety Interventions fall prevention program maintained;gait belt;nonskid shoes/slippers when out of bed  -LS     ROM (Range of Motion)    General ROM no range of motion deficits identified  -LS     MMT (Manual Muscle Testing)    General MMT Assessment lower extremity strength deficits identified  -LS     Lower Extremity    Lower Ext Manual Muscle Testing Detail BLEs grossly 4-/5  -LS     Bed Mobility, Assessment/Treatment    Bed Mobility, Assistive Device bed rails;head of bed elevated;draw sheet  -LS     Bed Mob, Supine to Sit, [place] moderate assist (50% patient effort);2 person assist required;verbal cues required  -LS     Bed Mob, Sit to Supine, [place] not tested  -LS     Bed Mobility, Impairments strength decreased;impaired balance  -LS     Bed Mobility, Comment VC's for sequencing/   -LS     Transfer Assessment/Treatment    Transfers, Bed-Chair [place] moderate assist (50% patient effort);2 person assist required;verbal cues required  -LS     Transfers, Sit-Stand [place] maximum assist (25% patient effort);2 person assist required;verbal cues required  -LS     Transfers, Stand-Sit [place] maximum assist (25% patient effort);2 person assist required;verbal cues required  -LS     Transfer, Impairments impaired balance;strength decreased  -LS     Transfer, Comment Performed STS throughout session (x1 from EOB and x1 from recliner). PT/tech on either side of pt for blocking knees and BUE support.   -LS     Gait Assessment/Treatment    Gait, [place] Level not appropriate to assess  -LS     Motor Skills/Interventions    Additional Documentation Balance Skills Training (Group)  -LS     Balance Skills Training    Sitting-Level of Assistance Minimum assistance  -LS     Sitting-Balance Support Right upper extremity supported;Left  upper extremity supported;Feet unsupported  -LS     Standing-Level of Assistance Moderate assistance;x2  -LS     Static Standing Balance Support Right upper extremity supported;Left upper extremity supported  -LS     Gait Balance-Level of Assistance Maximum assistance;x2  -LS     Gait Balance Support Right upper extremity supported;Left upper extremity supported  -LS     Therapy Exercises    Bilateral Lower Extremities AROM:;5 reps;sitting;ankle pumps/circles  -LS     Sensory Assessment/Intervention    Light Touch RLE;LLE  -LS     LLE Light Touch WNL  -LS     RLE Light Touch WNL  -LS     Positioning and Restraints    Pre-Treatment Position in bed  -LS     Post Treatment Position chair  -LS     In Chair notified nsg;reclined;call light within reach;encouraged to call for assist;exit alarm on;RUE elevated;LUE elevated;on mechanical lift sling;waffle cushion;legs elevated;heels elevated  -LS       10/11/17 0811 10/11/17 0316    Rehab Evaluation    Evaluation Not Performed other (see comments)   eval noted for scrotal wrap, nsg independent with compression wrapping.   -     General Information    Equipment Currently Used at Home  oxygen  -BS    Living Environment    Transportation Available  car;family or friend will provide  -BS      10/10/17 0244       General Information    Equipment Currently Used at Home oxygen  -BS     Living Environment    Transportation Available car;family or friend will provide  -BS       User Key  (r) = Recorded By, (t) = Taken By, (c) = Cosigned By    Initials Name Provider Type    RAE Meehan, PT Physical Therapist    LS Alyce Pearce, PT Physical Therapist    BS Maria Esther Alatorre, RN Registered Nurse    AS Estrella Houser, Speech Therapy Student Speech Therapy Student          Physical Therapy Education     Title: PT OT SLP Therapies (Active)     Topic: Physical Therapy (Active)     Point: Mobility training (Active)    Learning Progress Summary    Learner Readiness Method Response  Comment Documented by Status   Patient Acceptance E,D NR  LS 10/12/17 1408 Active               Point: Body mechanics (Active)    Learning Progress Summary    Learner Readiness Method Response Comment Documented by Status   Patient Acceptance E,D NR  LS 10/12/17 1408 Active               Point: Precautions (Active)    Learning Progress Summary    Learner Readiness Method Response Comment Documented by Status   Patient Acceptance E,D NR  LS 10/12/17 1408 Active                      User Key     Initials Effective Dates Name Provider Type Discipline     06/19/15 -  Alyce Pearce, PT Physical Therapist PT                PT Recommendation and Plan  Anticipated Discharge Disposition: home with assist  PT Frequency: daily  Plan of Care Review  Plan Of Care Reviewed With: patient  Outcome Summary/Follow up Plan: PT evaluation complete. Pt demonstrates generalized weakness and decreased indep re: functional mobility with unstable signs/symptoms, warranting further skilled PT services to promote PLOF. Limited today by lethargy and confusion but very agreeable to participation. Recommend SNF at d/c.           IP PT Goals       10/12/17 1408          Bed Mobility PT LTG    Bed Mobility PT LTG, Date Established 10/12/17  -      Bed Mobility PT LTG, Time to Achieve 2 wks  -LS      Bed Mobility PT LTG, Activity Type supine to sit/sit to supine  -LS      Bed Mobility PT LTG, [place] Level minimum assist (75% patient effort)  -LS      Transfer Training PT LTG    Transfer Training PT LTG, Date Established 10/12/17  -LS      Transfer Training PT LTG, Time to Achieve 2 wks  -LS      Transfer Training PT LTG, Activity Type sit to stand/stand to sit  -LS      Transfer Training PT LTG, [place] Level minimum assist (75% patient effort)  -LS      Transfer Training PT LTG, Assist Device walker, rolling  -LS      Gait Training PT LTG    Gait Training Goal PT LTG, Date Established 10/12/17  -LS      Gait Training Goal PT LTG,  Time to Achieve 2 wks  -LS      Gait Training Goal PT LTG, [place] Level minimum assist (75% patient effort);2 person assist required  -LS      Gait Training Goal PT LTG, Assist Device walker, rolling  -LS      Gait Training Goal PT LTG, Distance to Achieve 50  -LS        User Key  (r) = Recorded By, (t) = Taken By, (c) = Cosigned By    Initials Name Provider Type    ASIA Pearce PT Physical Therapist                Outcome Measures       10/12/17 1312          How much help from another person do you currently need...    Turning from your back to your side while in flat bed without using bedrails? 2  -LS      Moving from lying on back to sitting on the side of a flat bed without bedrails? 2  -LS      Moving to and from a bed to a chair (including a wheelchair)? 2  -LS      Standing up from a chair using your arms (e.g., wheelchair, bedside chair)? 2  -LS      Climbing 3-5 steps with a railing? 1  -LS      To walk in hospital room? 1  -LS      AM-PAC 6 Clicks Score 10  -LS      Functional Assessment    Outcome Measure Options AM-PAC 6 Clicks Basic Mobility (PT)  -LS        User Key  (r) = Recorded By, (t) = Taken By, (c) = Cosigned By    Initials Name Provider Type    ASIA Pearce PT Physical Therapist           Time Calculation:         PT Charges       10/12/17 1411          Time Calculation    Start Time 1312  -LS      PT Received On 10/12/17  -LS      PT Goal Re-Cert Due Date 10/22/17  -        User Key  (r) = Recorded By, (t) = Taken By, (c) = Cosigned By    Initials Name Provider Type    ASIA Pearce PT Physical Therapist          Therapy Charges for Today     Code Description Service Date Service Provider Modifiers Qty    56480425587 HC PT EVAL HIGH COMPLEXITY 4 10/12/2017 Alyce Pearce, PT GP 1    47559719146 HC PT THER SUPP EA 15 MIN 10/12/2017 Alyce Pearce, PT GP 2          PT G-Codes  Outcome Measure Options: AM-PAC 6 Clicks Basic Mobility (PT)      Alyce Pearce,  PT  10/12/2017

## 2017-10-12 NOTE — NURSING NOTE
Only give patient valium and rifaximin crushed with pudding or apple sauce. Make sure the patient is alert, talking, and sitting up high. A FEEs will be repeated in the morning around 0830 - 0900, if the patient is more alert. Do not give patient any other PO medications, until FEEs evaluation in morning.

## 2017-10-12 NOTE — PROGRESS NOTES
Continued Stay Note   [facility]     Patient Name: Tod Ambrose  MRN: [id number]  Today's Date: 10/12/2017    Admit Date: 10/8/2017          Discharge Plan       10/12/17 1016    Case Management/Social Work Plan    Plan Home    Additional Comments Plan is for pt to go home at discharge, pt lives with sister in [facility].              Discharge Codes     None            Macy Logan RN

## 2017-10-12 NOTE — PLAN OF CARE
Problem: Patient Care Overview (Adult)  Goal: Plan of Care Review  Outcome: Ongoing (interventions implemented as appropriate)    10/12/17 1552   Coping/Psychosocial Response Interventions   Plan Of Care Reviewed With patient   Patient Care Overview   Progress (eval)   Outcome Evaluation   Outcome Summary/Follow up Plan Clinical dysphagia eval complete. Pt alert and cooperative. Pt currently on high flow nasal canula. Oral motor and structure seemingly WFL. Trialed thin via spoon/cup/straw, nectar thick via cup, puree, and solid. Overt clinical s/s aspiration of thin c/b immediate coughing. Delayed coughing w/ puree after being pushed. No overt clinical s/s aspiration w/ nectar thick and solid.  REC: NPO. SLP will f/u w/ FEES to r/o pharyngeal dysphagia.      FEES complete. Moderate-severe pharyngeal dysphagia. Pt lethargic and difficulty following commands. Pt presented w/ intermittent cough at baseline. Trialed thin via spoon/cup/straw, nectar thick via straw, and pudding. Diffuse thick secretions within vestibule area prior to trials, which partially cleared w/ ice chip X1. Significant delay (greater than 60 sec.) to the pyriforms w/ thin before initiation of the swallow. No pen/asp of thin initially; however, pt at great risk for asp given significant delay and lethargy. Aspiration from retrograde flow w/ thins, which pt responded w/ by coughing; however, an intermittent cough was noted at baseline. Timing improved w/ pudding; however, pt presented w/ delay to the pyfiroms as pt fatigued. No pen/asp w/ pudding or nectar thick. Mild amount of residue present w/ nectar thick in valleculae and diffuse residue present w/ pudding, which partially w/ consecutive spontaneous swallow 2' reduced base of tongue and reduced pharyngeal wall contraction.   REC: NPO. Meds crushed in applesauce/pudding only when pt is alert (discontinue if coughing noted). SLP will f/u in AM to re-evaluate if pt alert.          Problem:  Inpatient SLP  Goal: Dysphagia- Patient will improve swallowing skills to begin to take some PO safely  Outcome: Ongoing (interventions implemented as appropriate)    10/12/17 1552   Begin to Take Some PO Safely   Begin to Take Some PO Safely- SLP, Date Established 10/12/17   Begin to Take Some PO Safely- SLP, Time to Achieve by discharge   Begin to Take Some PO Safely- SLP, Date Goal Reviewed 10/12/17

## 2017-10-12 NOTE — PLAN OF CARE
Problem: Patient Care Overview (Adult)  Goal: Plan of Care Review  Outcome: Ongoing (interventions implemented as appropriate)    10/12/17 1408   Coping/Psychosocial Response Interventions   Plan Of Care Reviewed With patient   Outcome Evaluation   Outcome Summary/Follow up Plan PT evaluation complete. Pt demonstrates generalized weakness and decreased indep re: functional mobility with unstable signs/symptoms, warranting further skilled PT services to promote PLOF. Limited today by lethargy and confusion but very agreeable to participation. Recommend SNF at d/c.          Problem: Inpatient Physical Therapy  Goal: Bed Mobility Goal LTG- PT  Outcome: Ongoing (interventions implemented as appropriate)    10/12/17 1408   Bed Mobility PT LTG   Bed Mobility PT LTG, Date Established 10/12/17   Bed Mobility PT LTG, Time to Achieve 2 wks   Bed Mobility PT LTG, Activity Type supine to sit/sit to supine   Bed Mobility PT LTG, [place] Level minimum assist (75% patient effort)       Goal: Transfer Training Goal 1 LTG- PT  Outcome: Ongoing (interventions implemented as appropriate)    10/12/17 1408   Transfer Training PT LTG   Transfer Training PT LTG, Date Established 10/12/17   Transfer Training PT LTG, Time to Achieve 2 wks   Transfer Training PT LTG, Activity Type sit to stand/stand to sit   Transfer Training PT LTG, [place] Level minimum assist (75% patient effort)   Transfer Training PT LTG, Assist Device walker, rolling       Goal: Gait Training Goal LTG- PT  Outcome: Ongoing (interventions implemented as appropriate)    10/12/17 1408   Gait Training PT LTG   Gait Training Goal PT LTG, Date Established 10/12/17   Gait Training Goal PT LTG, Time to Achieve 2 wks   Gait Training Goal PT LTG, [place] Level minimum assist (75% patient effort);2 person assist required   Gait Training Goal PT LTG, Assist Device walker, rolling   Gait Training Goal PT LTG, Distance to Achieve 50

## 2017-10-12 NOTE — PROGRESS NOTES
Intensive Care Follow-up      LOS: 4 days     Mr. Tod Ambrose, 66 y.o. male is followed for: Altered mental status     Subjective - Interval History     Much more alert this morning  Doesn't always open eyes but will follow commands and responds to questions  Follow some commands but remains agitated at times pulling at lines  Pulled out IV and nasogastric tube  Doing okay off of BiPAP requiring high flow nasal cannula at 50% FiO2    The patient's relevant past medical, surgical and social history were reviewed and updated in Epic as appropriate.     Objective     Infusions:    dexmedetomidine 0.2-1.5 mcg/kg/hr Last Rate: 0.3 mcg/kg/hr (10/12/17 0554)   dextrose 50 mL/hr    norepinephrine 0.02-0.3 mcg/kg/min Last Rate: Stopped (10/11/17 1545)     Medications:    ampicillin 1 g Intravenous Q6H   bacitracin  Topical Q12H   diazePAM 10 mg Oral Q8H   famotidine 20 mg Oral BID   folic acid 1 mg Oral Daily   lactulose 20 g Oral BID   multivitamin and minerals 15 mL Oral Daily   nystatin  Topical Q12H   rifAXIMin 550 mg Oral Q12H   thiamine 100 mg Oral Daily     Intake/Output       10/11/17 0700 - 10/12/17 0659    Intake (ml) 2722.6    Output (ml) 2335    Net (ml) 387.6        Vital Sign Min/Max for last 24 hours  Temp  Min: 97.8 °F (36.6 °C)  Max: 98.7 °F (37.1 °C)   BP  Min: 83/58  Max: 124/67   Pulse  Min: 63  Max: 82   Resp  Min: 16  Max: 22   SpO2  Min: 81 %  Max: 97 %   Flow (L/min)  Min: 6  Max: 50        Physical Exam:   GENERAL: Awake, no distress   HEENT: No adenopathy or thyromegaly   LUNGS: Decreased breath sounds with crackles no wheezes   HEART: Regular rate and rhythm without murmurs   ABDOMEN: Soft, obese, protuberant, bowel sounds present.  Easily reducible hernia   EXTREMITIES: No edema or cyanosis.   NEURO/PSYCH: More alert.  Moves all fours.  Follow some commands.  Overall improved      Results from last 7 days  Lab Units 10/12/17  0409 10/11/17  0426 10/09/17  0457   WBC 10*3/mm3 9.76 11.30* 7.12    HEMOGLOBIN g/dL 12.9* 13.8 12.7*   PLATELETS 10*3/mm3 94* 108* 68*       Results from last 7 days  Lab Units 10/12/17  0409 10/11/17  2338 10/11/17  1323 10/11/17  0426 10/10/17  1540 10/10/17  0141  10/09/17  0457   SODIUM mmol/L 149*  --   --  146  --  142  --  134   POTASSIUM mmol/L 3.7  --   --  3.8 3.0* 2.9*  2.9*  --  3.1*   CO2 mmol/L 36.0*  --   --  35.0*  --  31.0  --  28.0   BUN mg/dL 26*  --   --  27*  --  43*  --  45*   CREATININE mg/dL 0.60  --   --  0.70  --  0.70  --  0.80   MAGNESIUM mg/dL  --   --   --  2.0  --  2.3  --  2.5   PHOSPHORUS mg/dL  --  1.9* 1.4* 1.6*  1.6* 1.4* 1.9*  < > 1.2*   GLUCOSE mg/dL 126*  --   --  145*  --  114*  --  107*   < > = values in this interval not displayed.  Estimated Creatinine Clearance: 105.1 mL/min (by C-G formula based on Cr of 0.6).            Results from last 7 days  Lab Units 10/08/17  1727   PH, ARTERIAL pH units 7.439   PCO2, ARTERIAL mm Hg 48.9*   PO2 ART mm Hg 62.3*     Lab Results   Component Value Date    LACTATE 1.1 10/10/2017          Images: Most recent chest x-ray reveals no consolidation or effusions with some basilar atelectasis    I reviewed the patient's results and images.     Impression      Hospital Problem List     * (Principal)Altered mental status    Sepsis    Elevated lactic acid level    Hepatic encephalopathy    H/O Alcoholic hepatitis with ascites    Alcohol abuse    Mixed respiratory failure    Diastolic dysfunction    Hypoglycemia    Hyponatremia               Plan        Increase free water  Increase Valium  Try to remain off of Precedex  Dysphagia evaluation; hopefully can start oral intake  Mobilize     Plan of care and goals reviewed with mulitdisciplinary team at daily rounds   I discussed the patient's findings and my recommendations with patient and nursing staff       NATHANAEL Peterson MD  Pulmonary and Critical Care Medicine

## 2017-10-13 NOTE — MBS/VFSS/FEES
Acute Care - Speech Language Pathology   Swallow Re-Evaluation [facility]   Clinical Swallow Re-Evaluation +  Fiberoptic Endoscopic Evaluation of Swallowing (FEES)     Patient Name: Tod Ambrose  : 1950  MRN: [id number]  Today's Date: 10/13/2017  Onset of Illness/Injury or Date of Surgery Date: 10/11/17  Date of Referral to SLP: 10/12/17         Admit Date: 10/8/2017    Visit Dx:     ICD-10-CM ICD-9-CM   1. Sepsis, due to unspecified organism A41.9 038.9     995.91   2. Lactic acidosis E87.2 276.2   3. Impaired functional mobility, balance, gait, and endurance Z74.09 V49.89   4. Dysphagia, unspecified type R13.10 787.20     Patient Active Problem List   Diagnosis   • Sepsis   • Altered mental status   • Diastolic dysfunction   • Hypoglycemia   • H/O Alcoholic hepatitis with ascites   • Elevated lactic acid level   • Hyponatremia   • Hepatic encephalopathy   • Alcohol abuse   • Mixed respiratory failure     Past Medical History:   Diagnosis Date   • Alcoholic hepatitis with ascites    • COPD (chronic obstructive pulmonary disease)    • Diastolic dysfunction    • Polycythemia    • Thrombocytopenia      Past Surgical History:   Procedure Laterality Date   • PARACENTESIS            SWALLOW EVALUATION (last 72 hours)      Swallow Evaluation       10/13/17 0900 10/12/17 1100             Rehab Evaluation    Document Type re-evaluation  -SM evaluation  -AC (r) AS (t) AC (c)       Subjective Information agree to therapy  - no complaints;agree to therapy  -AC (r) AS (t) AC (c)       General Information    Patient Profile Review  yes  -AC (r) AS (t) AC (c)       Onset of Illness/Injury  10/08/17  -AC (r) AS (t) AC (c)       Subjective Patient Observations Alert and cooperative  - Pt alert and cooperative. Family present @ bedside.   -AC (r) AS (t) AC (c)       Pertinent History Of Current Problem  Admit w/ altered mental status, mixed respiratiory failure. Currently on high flow nasal canula. Alcohol w/d.  -AC        Current Diet Limitations  NPO  -AC (r) AS (t) AC (c)       Precautions/Limitations, Vision  WFL;other (see comments)   for purposes of eval   -AC (r) AS (t) AC (c)       Precautions/Limitations, Hearing  WFL;other (see comments)   for purposes of eval   -AC (r) AS (t) AC (c)       Prior Level of Function- Communication  functional in all spheres  -AC (r) AS (t) AC (c)       Prior Level of Function- Swallowing safe, efficient swallowing in all situations  - no diet consistency restrictions  -AC (r) AS (t) AC (c)       Plans/Goals Discussed With patient;agreed upon  - patient and family;agreed upon  -AC (r) AS (t) AC (c)       Barriers to Rehab none identified  - none identified  -AC (r) AS (t) AC (c)       Clinical Impression    Patient's Goals For Discharge return to PO diet  - return to PO diet  -AC (r) AS (t) AC (c)       Family Goals For Discharge  patient able to return to PO diet  -AC (r) AS (t) AC (c)       SLP Swallowing Diagnosis  moderate dysphagia;severe dysphagia;pharyngeal dysfunction  -AC (r) AS (t) AC (c)       Rehab Potential/Prognosis, Swallowing good, to achieve stated therapy goals  - good, to achieve stated therapy goals  -AC (r) AS (t) AC (c)       Criteria for Skilled Therapeutic Interventions Met skilled criteria for dysphagia intervention met  - skilled criteria for dysphagia intervention met  -AC (r) AS (t) AC (c)       Therapy Frequency 5 times/wk  - 5 times/wk  -AC (r) AS (t) AC (c)       Predicted Duration Therapy Interv (days)  until discharge  -AC (r) AS (t) AC (c)       SLP Diet Recommendation IV - mechanical soft, no mixed consistencies;nectar/syrup-thick liquids;other (see comments)   ok for ice chips  - NPO: unsafe for food/liquid intake  -AC (r) AS (t) AC (c)       Recommended Diagnostics  reassess via clinical swallow (non-instrumental exam);other (see comments)   to determine instrumental readiness   -AC (r) AS (t) AC (c)       Recommended Feeding/Eating  Techniques maintain upright posture during/after eating for 30 mins;small sips/bites  -SM        SLP Rec. for Method of Medication Administration meds whole in pudding/applesauce  -SM meds crushed in pudding/applesauce   only if alert. Discontinue if pt coughing.   -AC (r) AS (t) AC (c)       Monitor For Signs Of Aspiration  cough  -AC (r) AS (t) AC (c)       Anticipated Discharge Disposition  other (see comments)   unknown at this time  -AC (r) AS (t) AC (c)       Demonstrates Need for Referral to Another Service  other (see comments)   may consider further esophageal workup   -AC (r) AS (t) AC (c)       Pain Assessment    Pain Assessment No/denies pain  -SM No/denies pain  -AC (r) AS (t) AC (c)       Cognitive Assessment/Intervention    Current Cognitive/Communication Assessment impaired  -SM --  -AC       Orientation Status oriented to;person;place  -SM        Follows Commands/Answers Questions 75% of the time;able to follow single-step instructions;needs cueing;needs increased time  -SM able to follow single-step instructions;50% of the time  -AC       Oral Motor Structure and Function    Oral Motor Anatomy and Physiology  patient demonstrates anatomy and physiology that is WNL  -AC (r) AS (t) AC (c)       Dentition Assessment  present and adequate  -AC (r) AS (t) AC (c)       Secretion Management  WNL/WFL  -AC (r) AS (t) AC (c)       Mucosal Quality  dry  -AC (r) AS (t) AC (c)       Volitional Swallow  no difficulties initiating volitional swallow  -AC (r) AS (t) AC (c)       Volitional Cough  no difficulties initiating volitional cough  -AC (r) AS (t) AC (c)       Oral Musculature General Assessment  WFL (within functional limits)  -AC (r) AS (t) AC (c)       General Feeding/Swallowing Observations    Current Feeding Method  NPO  -AC (r) AS (t) AC (c)       Respiratory Support Currently in Use  nasal cannula in use;other (see comments)   high flow nasal canula in use  -AC (r) AS (t) AC (c)       Observations  of Self Feeding Skills  appropriate self feeding skills observed  -AC (r) AS (t) AC (c)       Observations of Posture During Feeding  upright in bed  -AC (r) AS (t) AC (c)       Clinical Swallow Exam    Mode of Presentation  fed by clinician;self fed;cup;spoon;straw  -AC (r) AS (t) AC (c)       Oral Residue  cohesive solid:;tongue residue;other (see comments)   likely 2' dryness   -AC (r) AS (t) AC (c)       Oral Phase Results other (see comments)   adequate though prolonged  -SM intact oral phase without signs of dysfunction  -AC (r) AS (t) AC (c)       Pharyngeal Phase Results sign/symptoms of pharyngeal impairment;cough  -SM sign/symptoms of pharyngeal impairment;cough  -AC (r) AS (t) AC (c)       Summary of Clinical Exam Dysphagia Re-Eval showed coughing episode only with large, consecutive sips of liquid on 1 of 2 trials.  FEES to further assess as suspect ready for PO diet.   -SM Clinical dysphagia eval complete. Pt alert and cooperative. Pt currently on high flow nasal canula. Oral motor and structure seemingly WFL. Trialed thin via spoon/cup/straw, nectar thick via cup, puree, and solid. Overt clinical s/s aspiration of thin c/b immediate coughing. Delayed coughing w/ puree after being pushed. No overt clinical s/s aspiration w/ nectar thick and solid. REC: NPO. SLP will f/u w/ FEES to r/o pharyngeal dysphagia.   -AC (r) AS (t) AC (c)       Fiberoptic Endoscopic Swallowing Exam    Risks/Benefits Reviewed risks/benefits explained;agreed to eval;patient  -SM risks/benefits explained;patient;agreed to eval  -AC (r) AS (t) AC (c)       Positioning Needs for Swallow Exam  upright in chair  -AC (r) AS (t) AC (c)       Nasal Entry, Topical Anesthetic right:  -SM right:;nostril entered using no topical anesthetic  -AC (r) AS (t) AC (c)       Anatomy and Physiology    Velopharyngeal WFL  -SM WFL  -AC (r) AS (t) AC (c)       Base of Tongue symmetrical;range adequate  -SM symmetrical  -AC (r) AS (t) AC (c)        Epiglottis WFL  -SM WFL  -AC (r) AS (t) AC (c)       Laryngeal Function Breathing symmetrical  -SM symmetrical  -AC (r) AS (t) AC (c)       Laryngeal Function Phonation symmetrical  -SM symmetrical  -AC (r) AS (t) AC (c)       Laryngeal Function Breath Hold TVT/FVF/Arytenio;sustained closure  -SM TVF contact  -AC (r) AS (t) AC (c)       Secretions 0- Normal, no visible secretions  -SM 1- Secretions present around the laryngeal vestibule  -AC (r) AS (t) AC (c)       Secretion Description thin;clear  -SM thick;discolored  -AC (r) AS (t) AC (c)       Ice Chips DNA  -SM partially cleared secretions  -AC (r) AS (t) AC (c)       Spontaneous Swallow frequency adequate  -SM frequency adequate  -AC (r) AS (t) AC (c)       Sensory senses scope  -SM reduced sensation  -AC (r) AS (t) AC (c)       Oral-Phary Transit increased prep time;increased transit time  -SM solids not tested  -AC       Anatomy Hypopharynx    Observation Anatomic Considerations  other (see comments)   R-side pharyngeal wall prominence   -AC (r) AS (t) AC (c)       FEES    Mode of Presentation  thin:;nectar:;pudding:;fed by clinician;cup;spoon;straw  -AC (r) AS (t) AC (c)       Pharyngeal Phase Impairment thin:;bolus to pyriforms before initiation of response  -SM thin:;pudding:;bolus to pyriforms before initiation of response;tongue base retraction reduced;reduced pharyngeal wall contraction  -AC (r) AS (t) AC (c)       Post Swallow Residue  nectar:;residue present in valleculae;pudding:;other (see comments)   diffuse residue w/ pudding  -AC (r) AS (t) AC (c)       Rosenbek's Scale thin:;1-->Level 1  -SM thin:;nectar:;pudding:;1-->Level 1   Asp Level 6 2/ retrograde flow  -AC (r) AS (t) AC (c)       Response to Aspiration  productive reflexive cough  -AC (r) AS (t) AC (c)       Pharyngeal Phase Results  impaired pharyngeal phase of swallowing  -AC (r) AS (t) AC (c)       FEES Summary FEES completed: Mild oropharyngeal dysphagia. Prolonged though adequate  oral manipulation and transit of bolus with solids.  Delayed swallow initiation to level of the pyriform sinuses with large sips of thin liquids.  No penetration or aspiration observed with any consistency during FEES.  Though episodes of coughing with large/consecutive sips of liquid both before and after FEES.  Highly suspect aspiration of consecutive sips of thin liquids 2' delayed swallow.  Recommend: Dysphagia Level 4 Mech-soft diet and nectar-thick liquids.  Ok for ice chips. Meds whole in applesauce.  SLP will follow to work on safe tolerance of thin liquids and upgrade as appropriate in tx.  Will continue to follow.   - FEES complete. Moderate-severe pharyngeal dysphagia. Pt lethargic and difficulty following commands. Pt presented w/ intermittent cough at baseline. Trialed thin via spoon/cup/straw, nectar thick via straw, and pudding. Diffuse thick secretions within vestibule area prior to trials, which partially cleared w/ ice chip X1. Significant delay (greater than 60 sec.) to the pyriforms w/ thin before initiation of the swallow. No pen/asp of thin initially; however, pt at great risk for asp given significant delay and lethargy. Aspiration from retrograde flow w/ thins, which pt responded w/ by coughing; however, an intermittent cough was noted at baseline. Timing improved w/ pudding; however, pt presented w/ delay to the pyfiroms as pt fatigued. No pen/asp w/ pudding or nectar thick. Mild amount of residue present w/ nectar thick in valleculae and diffuse residue present w/ pudding, which partially w/ consecutive spontaneous swallow 2' reduced base of tongue and reduced pharyngeal wall contraction. REC: NPO. Meds crushed in applesauce/pudding only when pt is alert (discontinue if coughing noted). SLP will f/u in AM to re-evaluate if pt alert.    -AC (r) AS (t) AC (c)       FEES Swallow Recommendations    Oral Care  oral care with toothbrush and dentifrice BID and PRN  -AC (r) AS (t) AC (c)        Recommended Diet  NPO: unsafe for food/liquid intake  -AC (r) AS (t) AC (c)       Swallow Recommendations    Oral Care  oral care with toothbrush and dentifrice BID and PRN  -AC (r) AS (t) AC (c)       Recommended Diet  NPO: unsafe for food/liquid intake  -AC (r) AS (t) AC (c)       Dysphagia Treatment Objectives and Progress    Dysphagia Treatment Objectives Other 1  -SM Improve tongue base & pharyngeal wall squeeze;Other 1;Improve timing of pharyngeal response  -AC       Improve timing of pharyngeal response    To improve timing of pharyngeal response, patient will:  Swallow in timely way using super-supraglottic swallow;Swallow in timely way using three second prep;80%;with inconsistent cues  -AC       Status: Improve timing of pharyngeal response  New  -AC       Timing of Pharyngeal Response Progress  continue to adress  -AC       Improve tongue base & pharyngeal wall squeeze    To improve tongue base & pharyngeal wall squeeze, patient will:  Complete tongue base retraction;Complete effortful swallow;80%;with inconsistent cues  -AC (r) AS (t) AC (c)       Status: Improve tongue base & pharyngeal wall squeeze  New  -AC (r) AS (t) AC (c)       Tongue Base/Pharyngeal Wall Squeeze Progress  discontinue achieved  -AC (r) AS (t) AC (c)       Dysphagia Other 1    Dysphagia Other 1 Objective Tolerate thin liquids without s/s aspiration with 100% accuracy and no cues for swallow precautions.   -SM Pt will tolerate PO trials w/o s/s aspiration w/ 100% acc w/o cues.   -AC (r) AS (t) AC (c)       Status: Dysphagia Other 1 New  -SM New  -AC (r) AS (t) AC (c)       Dysphagia Other 1 Progress continue to address  -SM continue to address  -AC (r) AS (t) AC (c)         User Key  (r) = Recorded By, (t) = Taken By, (c) = Cosigned By    Initials Name Effective Dates    SM Melany Alas, MS CCC-SLP 06/22/15 -     AC Sariah Starr, MS CCC-SLP 07/27/17 -     AS Estrella Houser, Speech Therapy Student 08/24/17 -          EDUCATION  The patient has been educated in the following areas:   Dysphagia (Swallowing Impairment) Modified Diet Instruction.    SLP Recommendation and Plan     SLP Diet Recommendation: IV - mechanical soft, no mixed consistencies, nectar/syrup-thick liquids, other (see comments) (ok for ice chips)  Recommended Feeding/Eating Techniques: maintain upright posture during/after eating for 30 mins, small sips/bites  SLP Rec. for Method of Medication Administration: meds whole in pudding/applesauce        Criteria for Skilled Therapeutic Interventions Met: skilled criteria for dysphagia intervention met     Rehab Potential/Prognosis, Swallowing: good, to achieve stated therapy goals  Therapy Frequency: 5 times/wk             Plan of Care Review  Plan Of Care Reviewed With: patient  Outcome Summary/Follow up Plan: FEES completed: Mild oropharyngeal dysphagia. Prolonged though adequate oral manipulation and transit of bolus with solids.  Delayed swallow initiation to level of the pyriform sinuses with large sips of thin liquids.  No penetration or aspiration observed with any consistency during FEES.  Though episodes of coughing with large/consecutive sips of liquid both before and after FEES.  Highly suspect aspiration of consecutive sips of thin liquids 2' delayed swallow.  Recommend: Dysphagia Level 4 Mech-soft diet and nectar-thick liquids.  Ok for ice chips. Meds whole in applesauce.  SLP will follow to work on safe tolerance of thin liquids and upgrade as appropriate in tx.  Will continue to follow.           IP SLP Goals       10/13/17 1045 10/12/17 1552       Safely Consume Diet    Safely Consume Diet- SLP, Date Established 10/13/17  -SM      Safely Consume Diet- SLP, Time to Achieve by discharge  -SM      Safely Consume Diet- SLP, Outcome goal ongoing  -SM      Begin to Take Some PO Safely    Begin to Take Some PO Safely- SLP, Date Established  10/12/17  -AC (r) AS (t) AC (c)     Begin to Take Some PO  Safely- SLP, Time to Achieve  by discharge  -AC (r) AS (t) AC (c)     Begin to Take Some PO Safely- SLP, Date Goal Reviewed  10/12/17  -AC (r) AS (t) AC (c)     Begin to Take Some PO Safely- SLP, Outcome goal met  -        User Key  (r) = Recorded By, (t) = Taken By, (c) = Cosigned By    Initials Name Provider Type    GLORIA Alas MS CCC-SLP Speech and Language Pathologist    BRUNO Starr MS CCC-SLP Speech and Language Pathologist    AS Estrella Houser, Speech Therapy Student Speech Therapy Student               Time Calculation:         Time Calculation- SLP       10/13/17 1046          Time Calculation- SLP    SLP Start Time 0900  -      SLP Received On 10/13/17  -        User Key  (r) = Recorded By, (t) = Taken By, (c) = Cosigned By    Initials Name Provider Type    GLORIA Alas MS CCC-SLP Speech and Language Pathologist          Therapy Charges for Today     Code Description Service Date Service Provider Modifiers Qty    43070991090 HC ST EVAL ORAL PHARYNG SWALLOW 3 10/13/2017 Melany Alas MS CCC-SLP GN 1    [id number] HC ST FIBEROPTIC ENDO EVAL SWALL 4 10/13/2017 Melany Alas MS CCC-SLP GN 1               Melany Alas MS CCC-SLP  10/13/2017

## 2017-10-13 NOTE — PROGRESS NOTES
Continued Stay Note  [facility]     Patient Name: Tod Ambrose  MRN: [id number]  Today's Date: 10/13/2017    Admit Date: 10/8/2017          Discharge Plan       10/13/17 1312    Case Management/Social Work Plan    Plan SNF    Additional Comments I spoke with Karla (sister)  [phone] she asked me to try to get her brother into a SNF that accepts longterm pt's, she was interested in [facility]. She is going to check out a few more facilities and she will follow up with the  on Monday. I spoke with Radha at [facility] and efaxed information.              Discharge Codes     None            Macy Logan RN

## 2017-10-13 NOTE — PLAN OF CARE
Problem: Patient Care Overview (Adult)  Goal: Plan of Care Review  Outcome: Ongoing (interventions implemented as appropriate)    10/13/17 1045   Coping/Psychosocial Response Interventions   Plan Of Care Reviewed With patient   Outcome Evaluation   Outcome Summary/Follow up Plan FEES completed: Mild oropharyngeal dysphagia. Prolonged though adequate oral manipulation and transit of bolus with solids. Delayed swallow initiation to level of the pyriform sinuses with large sips of thin liquids. No penetration or aspiration observed with any consistency during FEES. Though episodes of coughing with large/consecutive sips of liquid both before and after FEES. Highly suspect aspiration of consecutive sips of thin liquids 2' delayed swallow. Recommend: Dysphagia Level 4 Mec-soft diet and nectar-thick liquids. Ok for ice chips. Meds whole in applesauce. SLP will follow to work on safe tolerance of thin liquids and upgrade as appropriate in tx. Will continue to follow.          Problem: Inpatient SLP  Goal: Dysphagia- Patient will improve swallowing skills to begin to take some PO safely  Outcome: Outcome(s) achieved Date Met:  10/13/17    10/12/17 1552 10/13/17 1045   Begin to Take Some PO Safely   Begin to Take Some PO Safely- SLP, Date Established 10/12/17 --    Begin to Take Some PO Safely- SLP, Time to Achieve by discharge --    Begin to Take Some PO Safely- SLP, Date Goal Reviewed 10/12/17 --    Begin to Take Some PO Safely- SLP, Outcome --  goal met       Goal: Dysphagia- Patient will safely consume diet as per recommendation with no signs/symptoms of aspiration  Outcome: Ongoing (interventions implemented as appropriate)    10/13/17 1045   Safely Consume Diet   Safely Consume Diet- SLP, Date Established 10/13/17   Safely Consume Diet- SLP, Time to Achieve by discharge   Safely Consume Diet- SLP, Outcome goal ongoing

## 2017-10-13 NOTE — PROGRESS NOTES
Intensive Care Follow-up      LOS: 5 days     Mr. Tod Ambrose, 66 y.o. male is followed for: Altered mental status     Subjective - Interval History     Overall, more awake and alert  Plan get things, however and has pulled out nasogastric tube  Off of Precedex and vasopressor support    The patient's relevant past medical, surgical and social history were reviewed and updated in Epic as appropriate.     Objective     Infusions:    dexmedetomidine 0.2-1.5 mcg/kg/hr Last Rate: Stopped (10/13/17 1400)   norepinephrine 0.02-0.3 mcg/kg/min Last Rate: Stopped (10/11/17 1545)     Medications:    ampicillin 1 g Intravenous Q6H   bacitracin  Topical Q12H   diazePAM 10 mg Oral Q8H   famotidine 20 mg Oral BID   folic acid 1 mg Oral Daily   lactulose 20 g Oral BID   multivitamin and minerals 15 mL Oral Daily   nystatin  Topical Q12H   rifAXIMin 550 mg Oral Q12H   thiamine 100 mg Oral Daily     Intake/Output       10/12/17 0700 - 10/13/17 0659 10/13/17 0700 - 10/14/17 0659    Intake (ml) 1630.6 515    Output (ml) 1300 175    Net (ml) 330.6 340        Vital Sign Min/Max for last 24 hours  Temp  Min: 97.6 °F (36.4 °C)  Max: 98 °F (36.7 °C)   BP  Min: 81/50  Max: 131/68   Pulse  Min: 60  Max: 97   Resp  Min: 18  Max: 24   SpO2  Min: 88 %  Max: 98 %   Flow (L/min)  Min: 50  Max: 50        Physical Exam:   GENERAL: Awake, no distress   HEENT: No adenopathy or thyromegaly   LUNGS: Decreased breath sounds bilaterally without wheezes   HEART: Regular rate and rhythm without murmurs   ABDOMEN: Obese, protuberant, soft, no obvious fluid wave, reducible hernia   EXTREMITIES: Trace edema, no cyanosis   NEURO/PSYCH: More awake and alert.  Responds to questions in appropriate manner but still disoriented      Results from last 7 days  Lab Units 10/13/17  0446 10/12/17  0409 10/11/17  0426   WBC 10*3/mm3 10.00 9.76 11.30*   HEMOGLOBIN g/dL 13.0* 12.9* 13.8   PLATELETS 10*3/mm3 89* 94* 108*       Results from last 7 days  Lab Units  10/13/17  0446 10/12/17  0409 10/11/17  2338 10/11/17  1323 10/11/17  0426  10/10/17  0141   SODIUM mmol/L 145 149*  --   --  146  --  142   POTASSIUM mmol/L 3.9 3.7  --   --  3.8  < > 2.9*  2.9*   CO2 mmol/L 35.0* 36.0*  --   --  35.0*  --  31.0   BUN mg/dL 23 26*  --   --  27*  --  43*   CREATININE mg/dL 0.50* 0.60  --   --  0.70  --  0.70   MAGNESIUM mg/dL 2.0  --   --   --  2.0  --  2.3   PHOSPHORUS mg/dL 2.2*  --  1.9* 1.4* 1.6*  1.6*  < > 1.9*   GLUCOSE mg/dL 118* 126*  --   --  145*  --  114*   < > = values in this interval not displayed.  Estimated Creatinine Clearance: 105.1 mL/min (by C-G formula based on Cr of 0.5).            Results from last 7 days  Lab Units 10/08/17  1727   PH, ARTERIAL pH units 7.439   PCO2, ARTERIAL mm Hg 48.9*   PO2 ART mm Hg 62.3*     Lab Results   Component Value Date    LACTATE 1.1 10/10/2017          Images: Basilar atelectasis but no consolidation or effusions    I reviewed the patient's results and images.     Impression      Hospital Problem List     * (Principal)Altered mental status    Sepsis    Elevated lactic acid level    Hepatic encephalopathy    H/O Alcoholic hepatitis with ascites    Alcohol abuse    Mixed respiratory failure    Diastolic dysfunction    Hypoglycemia    Hyponatremia               Plan        Continue lactulose and Rifamixin  Complete course of antibiotics for UTI  Continue scheduled Valium for alcohol withdrawal  Continue nutritional support  Physical therapy and occupational therapy  Mobilize     Plan of care and goals reviewed with mulitdisciplinary team at daily rounds   I discussed the patient's findings and my recommendations with patient and nursing staff       NATHANAEL Peterson MD  Pulmonary and Critical Care Medicine

## 2017-10-13 NOTE — PLAN OF CARE
Problem: Patient Care Overview (Adult)  Goal: Plan of Care Review  Outcome: Ongoing (interventions implemented as appropriate)    10/13/17 1033   Coping/Psychosocial Response Interventions   Plan Of Care Reviewed With patient   Patient Care Overview   Progress improving   Outcome Evaluation   Outcome Summary/Follow up Plan Pt STS from EOB and t/f to chair with modA. Pt exhibits forward flexed posture and knees buckling during transfers. Pt limited by cognitive deficit, but able to follow commands majority of time.          Problem: Inpatient Physical Therapy  Goal: Bed Mobility Goal LTG- PT  Outcome: Ongoing (interventions implemented as appropriate)    10/12/17 1408 10/13/17 1033   Bed Mobility PT LTG   Bed Mobility PT LTG, Date Established 10/12/17 --    Bed Mobility PT LTG, Time to Achieve 2 wks --    Bed Mobility PT LTG, Activity Type supine to sit/sit to supine --    Bed Mobility PT LTG, [place] Level minimum assist (75% patient effort) --    Bed Mobility PT LTG, Outcome --  goal ongoing       Goal: Transfer Training Goal 1 LTG- PT  Outcome: Ongoing (interventions implemented as appropriate)    10/12/17 1408 10/13/17 1033   Transfer Training PT LTG   Transfer Training PT LTG, Date Established 10/12/17 --    Transfer Training PT LTG, Time to Achieve 2 wks --    Transfer Training PT LTG, Activity Type sit to stand/stand to sit --    Transfer Training PT LTG, [place] Level minimum assist (75% patient effort) --    Transfer Training PT LTG, Assist Device walker, rolling --    Transfer Training PT LTG, Outcome --  goal ongoing       Goal: Gait Training Goal LTG- PT  Outcome: Ongoing (interventions implemented as appropriate)    10/12/17 1408 10/13/17 1033   Gait Training PT LTG   Gait Training Goal PT LTG, Date Established 10/12/17 --    Gait Training Goal PT LTG, Time to Achieve 2 wks --    Gait Training Goal PT LTG, [place] Level minimum assist (75% patient effort);2 person assist required --     Gait Training Goal PT LTG, Assist Device walker, rolling --    Gait Training Goal PT LTG, Distance to Achieve 50 --    Gait Training Goal PT LTG, Outcome --  goal ongoing

## 2017-10-13 NOTE — PROGRESS NOTES
Adult Nutrition  Assessment/PES    Patient Name:  Tod Ambrose  YOB: 1950  MRN: [id number]  Admit Date:  10/8/2017    Assessment Date:  10/13/2017    Comments: RD follow-up, pt passed SLP FEES eval this AM, started on dysphagia level 4 diet with nectar thick liquids. Will send Boost Plus supplements 3x/day and continue to follow.           Reason for Assessment       10/13/17 1213    Reason for Assessment    Reason For Assessment/Visit follow up protocol;multidisciplinary rounds;dysphagia    Time Spent (min) 30    Diagnosis --   per notes this adm   Principal Problem:    Altered mental status  Active Problems:    Sepsis    Diastolic dysfunction    Hypoglycemia    H/O Alcoholic hepatitis with ascites    Elevated lactic acid level    Hyponatremia    Hepatic encephalopathy    Alcohol abuse    Mixed respiratory failure             Nutrition/Diet History       10/13/17 1215    Nutrition/Diet History    Other per I&O- FMS output x past 24 hrs= 500 ml              Labs/Tests/Procedures/Meds       10/13/17 1214    Labs/Tests/Procedures/Meds    Labs/Tests Review Reviewed    Procedure Review SLP   per SLP (10/13)- Dysphagia Level 4 Mech-soft diet and nectar-thick liquids. Ok for ice chips.    Swallow eval status Done    Type of SLP Evaluation --   FEES    Medication Review Reviewed, pertinent   thiamine, folic acid, MV     Results from last 7 days  Lab Units 10/13/17  0446   SODIUM mmol/L 145   POTASSIUM mmol/L 3.9   CHLORIDE mmol/L 103   CO2 mmol/L 35.0*   BUN mg/dL 23   CREATININE mg/dL 0.50*   CALCIUM mg/dL 8.7   BILIRUBIN mg/dL 3.5*   ALK PHOS U/L 181*   ALT (SGPT) U/L 35   AST (SGOT) U/L 127*   GLUCOSE mg/dL 118*                Nutrition Prescription Ordered       10/13/17 1215    Nutrition Prescription PO    Current PO Diet Dysphagia    Dysphagia Level 4  Mechanical soft no mixed consistencies    Fluid Consistency Nectar/syrup thick              Problem/Interventions:          Problem 2       10/13/17  1216    Nutrition Diagnoses Problem 2    Problem 2 Inadequate Nutrient Intake    Etiology (related to) --   clinical condition    Signs/Symptoms (evidenced by) --   pt started on PO diet today (10/13)                  Intervention Goal       10/13/17 1217    Intervention Goal    General Nutrition support treatment    PO Establish PO            Nutrition Intervention       10/13/17 1218    Nutrition Intervention    RD/Tech Action Care plan reviewd;Follow Tx progress;Supplement provided;Recommend/ordered    Recommended/Ordered Supplement            Nutrition Prescription       10/13/17 1218    Nutrition Prescription PO    PO Prescription Begin/change supplement    Supplement Boost Plus   nectar-thickened    Supplement Frequency 3 times a day    New PO Prescription Ordered? Yes            Education/Evaluation       10/13/17 1218    Monitor/Evaluation    Monitor Per protocol;PO intake;Supplement intake;Pertinent labs;Symptoms        Electronically signed by:  Viola Ríos MS RD/MEÑO [facility]  10/13/17 12:19 PM

## 2017-10-13 NOTE — DISCHARGE PLACEMENT REQUEST
"Arnol, Duglas (66 y.o. Male) Macy Logan  [phone]    Date of Birth Social Security Number Address Home Phone MRN    1950  [address] [id number] [phone] [id number]    Yarsani Marital Status          None Single       Admission Date Admission Type Admitting Provider Attending Provider Department, Room/Bed    10/8/17 Emergency Case, Vandana JOHNSON, DO Case, Vandana V., DO [facility] 2B ICU, N237/1    Discharge Date Discharge Disposition Discharge Destination                      Attending Provider: Vandana Castro DO     Allergies:  No Known Allergies    Isolation:  None   Infection:  None   Code Status:  Conditional    Ht:  64\" (162.6 cm)   Wt:  250 lb (113 kg)    Admission Cmt:  None   Principal Problem:  Altered mental status [R41.82]                 Active Insurance as of 10/8/2017     Primary Coverage     Payor Plan Insurance Group Employer/Plan Group    HUMANA MEDICARE REPLACEMENT HUMANA MEDICARE REPL [id number]     Payor Plan Address Payor Plan Phone Number Effective From Effective To    [address] [address] [phone] 3/1/2017     [place] [address]       Subscriber Name Subscriber Birth Date Member ID       DUGLAS QUEVEDO 1950 [id number]                 Emergency Contacts      (Rel.) Home Phone Work Phone Mobile Phone    ArnolCaroline botello (Sister) [phone] -- --    Karla Peterson (Sister) [phone] -- [phone]            Insurance Information                HUMANA MEDICARE REPLACEMENT/HUMANA MEDICARE REPL Phone: [phone]    Subscriber: Duglas Quevedo Subscriber#: [id number]    Group#: [id number] Precert#:              History & Physical      Vandana Castro DO at 10/8/2017  3:35 PM                                                                   Critical Care History & Physical    Patient name: Duglas Quevedo  CSN: [id number]  MRN: [id number]  : 1950  Today's date: 10/8/2017    Primary Care Physician: No Known " Provider    Chief complaint:  confusion    HPI: Mr. Ambrose is a 67 y/o WM who was admitted via the ED today for confusion.  According to his sister, who is an RN here and who he lives with, he had been increasingly confused and weak over the last few days.  She states that he lives in her basement and lost his job about a year ago.  He is noncompliant and does not go for any follow up appts.  He is still drinking alcohol and she is unsure of how much he drinks.  He does drink bourbon, beer and whiskey.  He is also still smoking.  She stated she went down to check on him today and there was stench.  He was incontinent of bowel and bladder and very lethargic.  She called EMS.  In the ED, he had a temp 97.9, P 113. R 16, 178/85 and 95% on 2L.  He was altered.  ABG on 32% had pH 7.43, pCO2 37, pO2 85.  WBC 8.8, Hgb 13.9, HCT 39.1, plts 66, troponin 0.02, Na 128, K 4.2, BUN 41, Cr 1.0, lipase 247, lactate 10.6, , ALT 34, Alk Phos 162.  CT head revealed questionable pontine infarct but there was motion artifact. UDS was negative. He will be admitted into the ICU per the Intensivist service.    PMH:   Past Medical History:   Diagnosis Date   • Alcoholic hepatitis with ascites    • COPD (chronic obstructive pulmonary disease)    • Diastolic dysfunction    • Polycythemia    • Thrombocytopenia      PSH:   Past Surgical History:   Procedure Laterality Date   • PARACENTESIS       PFH:   History reviewed. No pertinent family history.    SH:   Social History     Social History   • Marital status: Single     Spouse name: N/A   • Number of children: N/A   • Years of education: N/A     Social History Main Topics   • Smoking status: Never Smoker   • Smokeless tobacco: None   • Alcohol use Yes      Comment: daily unknown amount   • Drug use: No   • Sexual activity: Not Asked     Other Topics Concern   • None     Social History Narrative   • None     Allergies:   No Known Allergies    Code Status:  Conditional Code    Home  "Medications:  No prescriptions prior to admission.     Review of Systems  Negative systems except for what is noted in HPI     Vital Signs:  Blood pressure 166/94, pulse (!) 121, temperature 98.1 °F (36.7 °C), temperature source Axillary, resp. rate 21, height 64\" (162.6 cm), weight 250 lb (113 kg), SpO2 95 %.    Physical Exam:  Constitutional:  Appears well-developed and obese. No distress.   HEENT:  Normocephalic and atraumatic. PERRL  Neck:  Neck supple. No JVD present.   CV: Normal rate, regular rhythm, intact distal pulses.  No gallop, murmur or rub  Pulmonary/Chest: Effort normal. Coarse rhonchi bilaterally.   Abdominal: Soft. +BS.  No distended and no mass. + hernia. Obese. There is no tenderness. Dull to percussion.   Musculoskeletal: Normal muscle tone and strength  Neurological: Lethargic, does not follow commands  Skin: Skin is warm and dry. No rash noted.   Extremities:  No clubbing, edema or cyanosis    Labs:    Results from last 7 days  Lab Units 10/08/17  1140   WBC 10*3/mm3 8.82   HEMOGLOBIN g/dL 13.9   HEMATOCRIT % 39.1   PLATELETS 10*3/mm3 66*       Results from last 7 days  Lab Units 10/08/17  1140   SODIUM mmol/L 128*   POTASSIUM mmol/L 4.2   CHLORIDE mmol/L 84*   CO2 mmol/L 24.0   BUN mg/dL 41*   CREATININE mg/dL 1.00   CALCIUM mg/dL 9.2   BILIRUBIN mg/dL 7.2*   ALK PHOS U/L 162*   ALT (SGPT) U/L 34   AST (SGOT) U/L 121*   GLUCOSE mg/dL 175*     Magnesium   Date Value Ref Range Status   10/08/2017 2.1 1.3 - 2.7 mg/dL Final     Imaging:  CT a/p revealed Stranding identified within the mesenteric root with peripancreatic stranding identified. There are also inflammatory changes and wall thickening of the second and third portion of the duodenum. Correlation to clinical lab values is recommended. Stones are seen in the gallbladder. A small amount of fluid along the left paracolic gutter. Ventral abdominal wall hernia containing bowel and fat. No definite strangulation.    ECHO: 11/2/14 The study is " technically limited due to poor acoustic windows. The left ventricular chamber size is normal. The estimated ejection fraction is 50-55%. Abnormal left ventricular diastolic function is observed.  RA is noted dilated. There is mild mitral regurgitation.  There is moderate tricuspid regurgitation. The right ventricular systolic pressure is estimated to be 40-45 mmHg    ABG:     Results from last 7 days  Lab Units 10/08/17  1100   PH, ARTERIAL pH units 7.433   PO2 ART mm Hg 85.0   PCO2, ARTERIAL mm Hg 37.4   HCO3 ART mmol/L 25.0     Assessment:  Hospital Problem List     * (Principal)Altered mental status    Sepsis    Diastolic dysfunction    Hypoglycemia    H/O Alcoholic hepatitis with ascites    Elevated lactic acid level    Hyponatremia    Hepatic encephalopathy        Plan:   ICU admission  Start Lactulose for hepatic encephalopathy.   Gentle hydration  NPO  Serial lactates  Precedex if needed for agitation later  Repeat lipase in am  Add flagyl for enteritis. Continue Vanc and Zosyn  Procalcitonin  Check INR in order to calculate discriminate function. If > 32, patient may benefit from prednisolone.     KITTY Cesar, JONESP-BC, FNP-BC  Pulmonary & Critical Care Medicine    I have personally seen, interviewed and examined the patient and verified all the key components of the history, physical examination, assessment and plan with KITTY Cesar, LUDA-BC, FNP-BC and reviewed the note, which reflects my changes and contributions.    Time: was greater than 40 minutes.(This excludes time spent performing separately reportable procedures and services).       Vandana Castro DO  Pulmonary and Critical Care Medicine                              Electronically signed by Vandana Castro DO at 10/8/2017  5:20 PM        Hospital Medications (active)       Dose Frequency Start End    ampicillin 1 g/100 mL 0.9% NS (MBP) 1 g Every 6 Hours 10/10/2017 10/16/2017    Sig - Route: Infuse 100 mL into a  "venous catheter Every 6 (Six) Hours. - Intravenous    bacitracin 500 UNIT/GM ointment  Every 12 Hours Scheduled 10/11/2017     Sig - Route: Apply  topically Every 12 (Twelve) Hours. - Topical    Cosign for Ordering: Accepted by Vandana Castro DO on 10/11/2017  3:19 PM    dexmedetomidine (PRECEDEX) 400 mcg/100 mL (4 mcg/mL) infusion 0.2-1.5 mcg/kg/hr × 113 kg Titrated 10/8/2017     Sig - Route: Infuse 22.6-169.5 mcg/hr into a venous catheter Dose Adjusted By Provider As Needed. - Intravenous    dextrose (D5W) 5 % infusion 50 mL/hr Continuous 10/12/2017 10/13/2017    Sig - Route: Infuse 50 mL/hr into a venous catheter Continuous. - Intravenous    diazePAM (VALIUM) tablet 10 mg 10 mg Every 8 Hours Scheduled 10/12/2017 10/21/2017    Sig - Route: Take 2 tablets by mouth Every 8 (Eight) Hours. - Oral    famotidine (PEPCID) tablet 20 mg 20 mg 2 Times Daily 10/10/2017     Sig - Route: Take 1 tablet by mouth 2 (Two) Times a Day. - Oral    folic acid (FOLVITE) tablet 1 mg 1 mg Daily 10/9/2017     Sig - Route: Take 1 tablet by mouth Daily. - Oral    ipratropium-albuterol (DUO-NEB) nebulizer solution 3 mL 3 mL Every 4 Hours PRN 10/11/2017     Sig - Route: Take 3 mL by nebulization Every 4 (Four) Hours As Needed for Wheezing or Shortness of Air. - Nebulization    lactulose (CHRONULAC) 10 GM/15ML solution 20 g 20 g 2 Times Daily 10/11/2017     Sig - Route: Take 30 mL by mouth 2 (Two) Times a Day. - Oral    Magnesium Sulfate 2 gram Bolus, followed by 8 gram infusion (total Mg dose 10 grams)- Mg less than or equal to 1mg/dL 2 g As Needed 10/9/2017     Sig - Route: Infuse 50 mL into a venous catheter As Needed (Mg less than or equal to 1mg/dL). - Intravenous    Linked Group 1:  \"Or\" Linked Group Details        magnesium sulfate 4 gram infusion- Mg 1.6-1.9 mg/dL 4 g As Needed 10/9/2017     Sig - Route: Infuse 100 mL into a venous catheter As Needed (Mg 1.6-1.9 mg/dL). - Intravenous    Linked Group 1:  \"Or\" Linked Group Details     " "   Magnesium Sulfate 6 gram Infusion (2 gm x 3) -Mg 1.1 -1.5 mg/dL 2 g As Needed 10/9/2017     Sig - Route: Infuse 50 mL into a venous catheter As Needed (Mg 1.1 -1.5 mg/dL). - Intravenous    Linked Group 1:  \"Or\" Linked Group Details        multivitamin and minerals liquid 15 mL 15 mL Daily 10/9/2017     Sig - Route: Take 15 mL by mouth Daily. - Oral    norepinephrine (LEVOPHED) 8 mg/250 mL (32 mcg/mL) in sodium chloride 0.9% infusion (premix) 0.02-0.3 mcg/kg/min × 113 kg Titrated 10/10/2017     Sig - Route: Infuse 2.26-33.9 mcg/min into a venous catheter Dose Adjusted By Provider As Needed. - Intravenous    nystatin (MYCOSTATIN) powder  Every 12 Hours Scheduled 10/8/2017     Sig - Route: Apply  topically Every 12 (Twelve) Hours. - Topical    ondansetron (ZOFRAN) injection 4 mg 4 mg Every 6 Hours PRN 10/8/2017     Sig - Route: Infuse 2 mL into a venous catheter Every 6 (Six) Hours As Needed for Nausea or Vomiting. - Intravenous    potassium & sodium phosphates (PHOS-NAK) 280-160-250 MG packet - for Phosphorus 1.25 - 2.1 mg/dL 2 packet Once As Needed 10/10/2017     Sig - Route: Take 2 packets by mouth 1 (One) Time As Needed (Phosphorus 1.25 - 2.1 mg/dL). - Oral    Linked Group 2:  \"Or\" Linked Group Details        potassium & sodium phosphates (PHOS-NAK) 280-160-250 MG packet - for Phosphorus less than 1.25 mg/dL 2 packet Every 6 Hours PRN 10/10/2017     Sig - Route: Take 2 packets by mouth Every 6 (Six) Hours As Needed (Phosphorus less than 1.25 mg/dL). - Oral    Linked Group 2:  \"Or\" Linked Group Details        potassium chloride (KLOR-CON) packet 40 mEq 40 mEq As Needed 10/10/2017     Sig - Route: Take 40 mEq by mouth As Needed (potassium replacement, see admin instructions). - Oral    potassium chloride (MICRO-K) CR capsule 40 mEq 40 mEq As Needed 10/10/2017     Sig - Route: Take 4 capsules by mouth As Needed (potassium replacement.  see admin instructions). - Oral    potassium chloride 10 mEq in 100 mL IVPB 10 " mEq Every 1 Hour PRN 10/9/2017     Sig - Route: Infuse 100 mL into a venous catheter Every 1 (One) Hour As Needed (See admin Instructions.). - Intravenous    rifaximin (XIFAXAN) tablet 550 mg 550 mg Every 12 Hours Scheduled 10/9/2017     Sig - Route: Take 1 tablet by mouth Every 12 (Twelve) Hours. - Oral    sodium chloride 0.9 % flush 1-10 mL 1-10 mL As Needed 10/8/2017     Sig - Route: Infuse 1-10 mL into a venous catheter As Needed for Line Care. - Intravenous    sodium chloride 0.9 % flush 1-10 mL 1-10 mL As Needed 10/10/2017     Sig - Route: 1-10 mL by Intracatheter route As Needed for Line Care. - Intracatheter    Cosign for Ordering: Accepted by Christiano Peterson MD on 10/10/2017  3:10 PM    thiamine (VITAMIN B-1) tablet 100 mg 100 mg Daily 10/9/2017     Sig - Route: Take 1 tablet by mouth Daily. - Oral             Physical Therapy Notes (most recent note)      Sarah Marquez PT at 10/13/2017 10:38 AM  Version 1 of 1         Acute Care - Physical Therapy Treatment Note  [facility]     Patient Name: Tod Ambrose  : 1950  MRN: [id number]  Today's Date: 10/13/2017  Onset of Illness/Injury or Date of Surgery Date: 10/11/17  Date of Referral to PT: 10/11/17  Referring Physician: MD Nicholas    Admit Date: 10/8/2017    Visit Dx:    ICD-10-CM ICD-9-CM   1. Sepsis, due to unspecified organism A41.9 038.9     995.91   2. Lactic acidosis E87.2 276.2   3. Impaired functional mobility, balance, gait, and endurance Z74.09 V49.89   4. Dysphagia, unspecified type R13.10 787.20     Patient Active Problem List   Diagnosis   • Sepsis   • Altered mental status   • Diastolic dysfunction   • Hypoglycemia   • H/O Alcoholic hepatitis with ascites   • Elevated lactic acid level   • Hyponatremia   • Hepatic encephalopathy   • Alcohol abuse   • Mixed respiratory failure               Adult Rehabilitation Note       10/13/17 0841          Rehab Assessment/Intervention    Discipline physical therapist  -KR      Document  Type therapy note (daily note)  -KR      Subjective Information agree to therapy;no complaints  -KR      Patient Effort, Rehab Treatment good  -KR      Symptoms Noted During/After Treatment none  -KR      Precautions/Limitations fall precautions;oxygen therapy device and L/min   large abdominal hernia  -KR      Recorded by [KR] Sarah Marquez, PT      Vital Signs    Pre Systolic BP Rehab 94  -KR      Pre Treatment Diastolic BP 50  -KR      Post Systolic BP Rehab 95  -KR      Post Treatment Diastolic BP 63  -KR      Pretreatment Resp Rate (breaths/min) 79  -KR      Posttreatment Resp Rate (breaths/min) 91  -KR      Pre SpO2 (%) 94  -KR      O2 Delivery Pre Treatment supplemental O2  -KR      Post SpO2 (%) 90  -KR      O2 Delivery Post Treatment supplemental O2  -KR      Pre Patient Position Supine  -KR      Intra Patient Position Standing  -KR      Post Patient Position Sitting  -KR      Recorded by [KR] Sarah Marquez, PT      Pain Assessment    Pain Assessment 0-10  -KR      Pain Score 0  -KR      Post Pain Score 0  -KR      Recorded by [KR] Sarah Marquez, PT      Cognitive Assessment/Intervention    Current Cognitive/Communication Assessment impaired  -KR      Orientation Status oriented to;person;place  -KR      Follows Commands/Answers Questions able to follow single-step instructions;75% of the time;needs cueing;needs increased time  -KR      Personal Safety moderate impairment;decreased awareness, need for assist;decreased awareness, need for safety;decreased insight to deficits  -KR      Personal Safety Interventions fall prevention program maintained;gait belt;nonskid shoes/slippers when out of bed  -KR      Recorded by [KR] Sarah Marquez, PT      Bed Mobility, Assessment/Treatment    Bed Mob, Supine to Sit, [place] moderate assist (50% patient effort);verbal cues required  -KR      Bed Mob, Sit to Supine, [place] not tested  -KR      Bed Mobility, Impairments strength decreased;impaired balance   -KR      Bed Mobility, Comment VC's for sequencing  -KR      Recorded by [KR] Sarah Marquez, PT      Transfer Assessment/Treatment    Transfers, Bed-Chair [place] moderate assist (50% patient effort);1 person + 1 person to manage equipment;verbal cues required  -KR      Transfers, Sit-Stand [place] moderate assist (50% patient effort);verbal cues required  -KR      Transfers, Stand-Sit [place] moderate assist (50% patient effort);verbal cues required  -KR      Transfer, Safety Issues step length decreased;weight-shifting ability decreased;balance decreased during turns  -KR      Transfer, Impairments strength decreased;impaired balance;coordination impaired  -KR      Transfer, Comment Pt unsteady with decreased balance upon standing; demonstrates significant foward flexed posture.   -KR      Recorded by [KR] Sarah Marquez, PT      Gait Assessment/Treatment    Gait, [place] Level moderate assist (50% patient effort);1 person + 1 person to manage equipment  -KR      Gait, Distance (Feet) --   5 steps from bed to chair  -KR      Gait, Gait Pattern Analysis swing-to gait  -KR      Gait, Safety Issues step length decreased;weight-shifting ability decreased;balance decreased during turns  -KR      Gait, Impairments strength decreased;impaired balance;coordination impaired  -KR      Gait, Comment Pt with forward flexed posture; VC's unsuccessful. Pt unable to achieve full knee extension.   -KR      Recorded by [KR] Sarah Marquez PT      Motor Skills/Interventions    Additional Documentation Balance Skills Training (Group)  -KR      Recorded by [JEANINE] Sarah Marquez, PT      Balance Skills Training    Sitting-Level of Assistance Minimum assistance  -KR      Sitting-Balance Support Right upper extremity supported;Left upper extremity supported;Feet supported  -KR      Standing-Level of Assistance Moderate assistance  -KR      Static Standing Balance Support Left upper extremity supported  -KR      Gait  Balance-Level of Assistance Moderate assistance  -KR      Gait Balance Support Left upper extremity supported  -KR      Recorded by [JEANINE] Sarah Marquez, PT      Therapy Exercises    Bilateral Lower Extremities AROM:;sitting;ankle pumps/circles;hip flexion;LAQ;10 reps  -KR      Recorded by [MEET Marquez, PT      Positioning and Restraints    Pre-Treatment Position in bed  -KR      Post Treatment Position chair  -KR      In Chair notified nsg;reclined;call light within reach;encouraged to call for assist;exit alarm on;RLE elevated;LLE elevated  -KR      Recorded by [JEANINE] Sarah Marquez, PT        User Key  (r) = Recorded By, (t) = Taken By, (c) = Cosigned By    Initials Name Effective Dates    JEANINE Marquez, PT 09/25/17 -                 IP PT Goals       10/13/17 1033 10/12/17 1408       Bed Mobility PT LTG    Bed Mobility PT LTG, Date Established  10/12/17  -LS     Bed Mobility PT LTG, Time to Achieve  2 wks  -LS     Bed Mobility PT LTG, Activity Type  supine to sit/sit to supine  -LS     Bed Mobility PT LTG, [place] Level  minimum assist (75% patient effort)  -LS     Bed Mobility PT LTG, Outcome goal ongoing  -KR      Transfer Training PT LTG    Transfer Training PT LTG, Date Established  10/12/17  -LS     Transfer Training PT LTG, Time to Achieve  2 wks  -LS     Transfer Training PT LTG, Activity Type  sit to stand/stand to sit  -LS     Transfer Training PT LTG, [place] Level  minimum assist (75% patient effort)  -LS     Transfer Training PT LTG, Assist Device  walker, rolling  -LS     Transfer Training PT LTG, Outcome goal ongoing  -KR      Gait Training PT LTG    Gait Training Goal PT LTG, Date Established  10/12/17  -LS     Gait Training Goal PT LTG, Time to Achieve  2 wks  -LS     Gait Training Goal PT LTG, [place] Level  minimum assist (75% patient effort);2 person assist required  -LS     Gait Training Goal PT LTG, Assist Device  walker, rolling  -LS     Gait Training Goal PT LTG,  Distance to Achieve  50  -LS     Gait Training Goal PT LTG, Outcome goal ongoing  -KR        User Key  (r) = Recorded By, (t) = Taken By, (c) = Cosigned By    Initials Name Provider Type    LS Alyce Pearce, PT Physical Therapist    JEANINE Marquez, PT Physical Therapist          Physical Therapy Education     Title: PT OT SLP Therapies (Active)     Topic: Physical Therapy (Active)     Point: Mobility training (Active)    Learning Progress Summary    Learner Readiness Method Response Comment Documented by Status   Patient Acceptance E NR  KR 10/13/17 1033 Active    Acceptance E,D NR  LS 10/12/17 1408 Active               Point: Body mechanics (Active)    Learning Progress Summary    Learner Readiness Method Response Comment Documented by Status   Patient Acceptance E NR  KR 10/13/17 1033 Active    Acceptance E,D NR  LS 10/12/17 1408 Active               Point: Precautions (Active)    Learning Progress Summary    Learner Readiness Method Response Comment Documented by Status   Patient Acceptance E NR  KR 10/13/17 1033 Active    Acceptance E,D NR  LS 10/12/17 1408 Active                      User Key     Initials Effective Dates Name Provider Type Discipline    LS 06/19/15 -  Alyce Pearce, PT Physical Therapist PT    KR 09/25/17 -  Sarah Marquez, PT Physical Therapist PT                    PT Recommendation and Plan  Anticipated Discharge Disposition: home with assist  PT Frequency: daily  Plan of Care Review  Plan Of Care Reviewed With: patient  Progress: improving  Outcome Summary/Follow up Plan: Pt STS from EOB and t/f to chair with modA. Pt exhibits forward flexed posture and knees buckling during transfers. Pt limited by cognitive deficit, but able to follow commands majority of time.           Outcome Measures       10/13/17 0841 10/12/17 1312       How much help from another person do you currently need...    Turning from your back to your side while in flat bed without using bedrails? 2  -KR 2  -LS      Moving from lying on back to sitting on the side of a flat bed without bedrails? 2  -KR 2  -LS     Moving to and from a bed to a chair (including a wheelchair)? 2  -KR 2  -LS     Standing up from a chair using your arms (e.g., wheelchair, bedside chair)? 2  -KR 2  -LS     Climbing 3-5 steps with a railing? 1  -KR 1  -LS     To walk in hospital room? 1  -KR 1  -LS     AM-PAC 6 Clicks Score 10  -KR 10  -LS     Functional Assessment    Outcome Measure Options AM-PAC 6 Clicks Basic Mobility (PT)  -KR AM-PAC 6 Clicks Basic Mobility (PT)  -LS       User Key  (r) = Recorded By, (t) = Taken By, (c) = Cosigned By    Initials Name Provider Type    LS Alyce Pearce, PT Physical Therapist    KR Sarah Marquez, PT Physical Therapist           Time Calculation:         PT Charges       10/13/17 1038          Time Calculation    Start Time 0841  -KR      PT Received On 10/13/17  -KR      PT Goal Re-Cert Due Date 10/22/17  -KR      Time Calculation- PT    Total Timed Code Minutes- PT 25 minute(s)  -KR        User Key  (r) = Recorded By, (t) = Taken By, (c) = Cosigned By    Initials Name Provider Type    KR Sarah Marquez, PT Physical Therapist          Therapy Charges for Today     Code Description Service Date Service Provider Modifiers Qty    85594663566 HC PT THER PROC EA 15 MIN 10/13/2017 Sarah Marquez, PT GP 2    19805829491 HC PT THER SUPP EA 15 MIN 10/13/2017 Sarah Marquez, PT GP 2          PT G-Codes  Outcome Measure Options: AM-PAC 6 Clicks Basic Mobility (PT)    Carol Marquez PT  10/13/2017            Electronically signed by Sakina Hernandez, PT at 10/13/2017 12:00 PM           Occupational Therapy Notes (most recent note)      Connie Cervantes, OT at 10/12/2017  9:18 AM  Version 1 of 1         ICU Rounds: PT evaluation pending this date.     Electronically signed by Connie Cervantes OT at 10/12/2017  9:18 AM

## 2017-10-13 NOTE — DISCHARGE PLACEMENT REQUEST
"Duglas Quevedo (66 y.o. Male)     Date of Birth Social Security Number Address Home Phone MRN    1950  [address] [address] [phone] [id number]    [facility] Status          None Single       Admission Date Admission Type Admitting Provider Attending Provider Department, Room/Bed    10/8/17 Emergency Case, Vandana JOHNSON DO Case, Vandana JOHNSON DO [facility] 2B ICU, N237/1    Discharge Date Discharge Disposition Discharge Destination                      Attending Provider: Vandana Castro DO     Allergies:  No Known Allergies    Isolation:  None   Infection:  None   Code Status:  Conditional    Ht:  64\" (162.6 cm)   Wt:  250 lb (113 kg)    Admission Cmt:  None   Principal Problem:  Altered mental status [R41.82]                 Active Insurance as of 10/8/2017     Primary Coverage     Payor Plan Insurance Group Employer/Plan Group    HUMANA MEDICARE REPLACEMENT HUMANA MEDICARE REPL [id number]     Payor Plan Address Payor Plan Phone Number Effective From Effective To    [address] [address] [phone] 3/1/2017     [place] [address]       Subscriber Name Subscriber Birth Date Member ID       DUGLAS QUEVEDO 1950 [id number]                 Emergency Contacts      (Rel.) Home Phone Work Phone Mobile Phone    Caroline Quevedo (Sister) [phone] -- --    Karla Peterson (Sister) [phone] -- [phone]            Insurance Information                HUMANA MEDICARE REPLACEMENT/HUMANA MEDICARE REPL Phone: [phone]    Subscriber: Duglas Quevedo Subscriber#: [id number]    Group#: [id number] Precert#:              History & Physical      Vandana Castro DO at 10/8/2017  3:35 PM                                                                   Critical Care History & Physical    Patient name: Duglas Quevedo  CSN: [id number]  MRN: [id number]  : 1950  Today's date: 10/8/2017    Primary Care Physician: No Known Provider    Chief complaint:  " confusion    HPI: Mr. Ambrose is a 67 y/o WM who was admitted via the ED today for confusion.  According to his sister, who is an RN here and who he lives with, he had been increasingly confused and weak over the last few days.  She states that he lives in her basement and lost his job about a year ago.  He is noncompliant and does not go for any follow up appts.  He is still drinking alcohol and she is unsure of how much he drinks.  He does drink bourbon, beer and whiskey.  He is also still smoking.  She stated she went down to check on him today and there was stench.  He was incontinent of bowel and bladder and very lethargic.  She called EMS.  In the ED, he had a temp 97.9, P 113. R 16, 178/85 and 95% on 2L.  He was altered.  ABG on 32% had pH 7.43, pCO2 37, pO2 85.  WBC 8.8, Hgb 13.9, HCT 39.1, plts 66, troponin 0.02, Na 128, K 4.2, BUN 41, Cr 1.0, lipase 247, lactate 10.6, , ALT 34, Alk Phos 162.  CT head revealed questionable pontine infarct but there was motion artifact. UDS was negative. He will be admitted into the ICU per the Intensivist service.    PMH:   Past Medical History:   Diagnosis Date   • Alcoholic hepatitis with ascites    • COPD (chronic obstructive pulmonary disease)    • Diastolic dysfunction    • Polycythemia    • Thrombocytopenia      PSH:   Past Surgical History:   Procedure Laterality Date   • PARACENTESIS       PFH:   History reviewed. No pertinent family history.    SH:   Social History     Social History   • Marital status: Single     Spouse name: N/A   • Number of children: N/A   • Years of education: N/A     Social History Main Topics   • Smoking status: Never Smoker   • Smokeless tobacco: None   • Alcohol use Yes      Comment: daily unknown amount   • Drug use: No   • Sexual activity: Not Asked     Other Topics Concern   • None     Social History Narrative   • None     Allergies:   No Known Allergies    Code Status:  Conditional Code    Home Medications:  No prescriptions  "prior to admission.     Review of Systems  Negative systems except for what is noted in HPI     Vital Signs:  Blood pressure 166/94, pulse (!) 121, temperature 98.1 °F (36.7 °C), temperature source Axillary, resp. rate 21, height 64\" (162.6 cm), weight 250 lb (113 kg), SpO2 95 %.    Physical Exam:  Constitutional:  Appears well-developed and obese. No distress.   HEENT:  Normocephalic and atraumatic. PERRL  Neck:  Neck supple. No JVD present.   CV: Normal rate, regular rhythm, intact distal pulses.  No gallop, murmur or rub  Pulmonary/Chest: Effort normal. Coarse rhonchi bilaterally.   Abdominal: Soft. +BS.  No distended and no mass. + hernia. Obese. There is no tenderness. Dull to percussion.   Musculoskeletal: Normal muscle tone and strength  Neurological: Lethargic, does not follow commands  Skin: Skin is warm and dry. No rash noted.   Extremities:  No clubbing, edema or cyanosis    Labs:    Results from last 7 days  Lab Units 10/08/17  1140   WBC 10*3/mm3 8.82   HEMOGLOBIN g/dL 13.9   HEMATOCRIT % 39.1   PLATELETS 10*3/mm3 66*       Results from last 7 days  Lab Units 10/08/17  1140   SODIUM mmol/L 128*   POTASSIUM mmol/L 4.2   CHLORIDE mmol/L 84*   CO2 mmol/L 24.0   BUN mg/dL 41*   CREATININE mg/dL 1.00   CALCIUM mg/dL 9.2   BILIRUBIN mg/dL 7.2*   ALK PHOS U/L 162*   ALT (SGPT) U/L 34   AST (SGOT) U/L 121*   GLUCOSE mg/dL 175*     Magnesium   Date Value Ref Range Status   10/08/2017 2.1 1.3 - 2.7 mg/dL Final     Imaging:  CT a/p revealed Stranding identified within the mesenteric root with peripancreatic stranding identified. There are also inflammatory changes and wall thickening of the second and third portion of the duodenum. Correlation to clinical lab values is recommended. Stones are seen in the gallbladder. A small amount of fluid along the left paracolic gutter. Ventral abdominal wall hernia containing bowel and fat. No definite strangulation.    ECHO: 11/2/14 The study is technically limited due to " poor acoustic windows. The left ventricular chamber size is normal. The estimated ejection fraction is 50-55%. Abnormal left ventricular diastolic function is observed.  RA is noted dilated. There is mild mitral regurgitation.  There is moderate tricuspid regurgitation. The right ventricular systolic pressure is estimated to be 40-45 mmHg    ABG:     Results from last 7 days  Lab Units 10/08/17  1100   PH, ARTERIAL pH units 7.433   PO2 ART mm Hg 85.0   PCO2, ARTERIAL mm Hg 37.4   HCO3 ART mmol/L 25.0     Assessment:  Hospital Problem List     * (Principal)Altered mental status    Sepsis    Diastolic dysfunction    Hypoglycemia    H/O Alcoholic hepatitis with ascites    Elevated lactic acid level    Hyponatremia    Hepatic encephalopathy        Plan:   ICU admission  Start Lactulose for hepatic encephalopathy.   Gentle hydration  NPO  Serial lactates  Precedex if needed for agitation later  Repeat lipase in am  Add flagyl for enteritis. Continue Vanc and Zosyn  Procalcitonin  Check INR in order to calculate discriminate function. If > 32, patient may benefit from prednisolone.     KITTY Cesar, JONESP-BC, FNP-BC  Pulmonary & Critical Care Medicine    I have personally seen, interviewed and examined the patient and verified all the key components of the history, physical examination, assessment and plan with KITTY Cesar, LUDA-BC, FNP-BC and reviewed the note, which reflects my changes and contributions.    Time: was greater than 40 minutes.(This excludes time spent performing separately reportable procedures and services).       Vandana Castro DO  Pulmonary and Critical Care Medicine                              Electronically signed by Vandana Castro DO at 10/8/2017  5:20 PM        Hospital Medications (active)       Dose Frequency Start End    ampicillin 1 g/100 mL 0.9% NS (MBP) 1 g Every 6 Hours 10/10/2017 10/16/2017    Sig - Route: Infuse 100 mL into a venous catheter Every 6 (Six)  "Hours. - Intravenous    bacitracin 500 UNIT/GM ointment  Every 12 Hours Scheduled 10/11/2017     Sig - Route: Apply  topically Every 12 (Twelve) Hours. - Topical    Cosign for Ordering: Accepted by Vandana Castro DO on 10/11/2017  3:19 PM    dexmedetomidine (PRECEDEX) 400 mcg/100 mL (4 mcg/mL) infusion 0.2-1.5 mcg/kg/hr × 113 kg Titrated 10/8/2017     Sig - Route: Infuse 22.6-169.5 mcg/hr into a venous catheter Dose Adjusted By Provider As Needed. - Intravenous    dextrose (D5W) 5 % infusion 50 mL/hr Continuous 10/12/2017 10/13/2017    Sig - Route: Infuse 50 mL/hr into a venous catheter Continuous. - Intravenous    diazePAM (VALIUM) tablet 10 mg 10 mg Every 8 Hours Scheduled 10/12/2017 10/21/2017    Sig - Route: Take 2 tablets by mouth Every 8 (Eight) Hours. - Oral    famotidine (PEPCID) tablet 20 mg 20 mg 2 Times Daily 10/10/2017     Sig - Route: Take 1 tablet by mouth 2 (Two) Times a Day. - Oral    folic acid (FOLVITE) tablet 1 mg 1 mg Daily 10/9/2017     Sig - Route: Take 1 tablet by mouth Daily. - Oral    ipratropium-albuterol (DUO-NEB) nebulizer solution 3 mL 3 mL Every 4 Hours PRN 10/11/2017     Sig - Route: Take 3 mL by nebulization Every 4 (Four) Hours As Needed for Wheezing or Shortness of Air. - Nebulization    lactulose (CHRONULAC) 10 GM/15ML solution 20 g 20 g 2 Times Daily 10/11/2017     Sig - Route: Take 30 mL by mouth 2 (Two) Times a Day. - Oral    Magnesium Sulfate 2 gram Bolus, followed by 8 gram infusion (total Mg dose 10 grams)- Mg less than or equal to 1mg/dL 2 g As Needed 10/9/2017     Sig - Route: Infuse 50 mL into a venous catheter As Needed (Mg less than or equal to 1mg/dL). - Intravenous    Linked Group 1:  \"Or\" Linked Group Details        magnesium sulfate 4 gram infusion- Mg 1.6-1.9 mg/dL 4 g As Needed 10/9/2017     Sig - Route: Infuse 100 mL into a venous catheter As Needed (Mg 1.6-1.9 mg/dL). - Intravenous    Linked Group 1:  \"Or\" Linked Group Details        Magnesium Sulfate 6 gram " "Infusion (2 gm x 3) -Mg 1.1 -1.5 mg/dL 2 g As Needed 10/9/2017     Sig - Route: Infuse 50 mL into a venous catheter As Needed (Mg 1.1 -1.5 mg/dL). - Intravenous    Linked Group 1:  \"Or\" Linked Group Details        multivitamin and minerals liquid 15 mL 15 mL Daily 10/9/2017     Sig - Route: Take 15 mL by mouth Daily. - Oral    norepinephrine (LEVOPHED) 8 mg/250 mL (32 mcg/mL) in sodium chloride 0.9% infusion (premix) 0.02-0.3 mcg/kg/min × 113 kg Titrated 10/10/2017     Sig - Route: Infuse 2.26-33.9 mcg/min into a venous catheter Dose Adjusted By Provider As Needed. - Intravenous    nystatin (MYCOSTATIN) powder  Every 12 Hours Scheduled 10/8/2017     Sig - Route: Apply  topically Every 12 (Twelve) Hours. - Topical    ondansetron (ZOFRAN) injection 4 mg 4 mg Every 6 Hours PRN 10/8/2017     Sig - Route: Infuse 2 mL into a venous catheter Every 6 (Six) Hours As Needed for Nausea or Vomiting. - Intravenous    potassium & sodium phosphates (PHOS-NAK) 280-160-250 MG packet - for Phosphorus 1.25 - 2.1 mg/dL 2 packet Once As Needed 10/10/2017     Sig - Route: Take 2 packets by mouth 1 (One) Time As Needed (Phosphorus 1.25 - 2.1 mg/dL). - Oral    Linked Group 2:  \"Or\" Linked Group Details        potassium & sodium phosphates (PHOS-NAK) 280-160-250 MG packet - for Phosphorus less than 1.25 mg/dL 2 packet Every 6 Hours PRN 10/10/2017     Sig - Route: Take 2 packets by mouth Every 6 (Six) Hours As Needed (Phosphorus less than 1.25 mg/dL). - Oral    Linked Group 2:  \"Or\" Linked Group Details        potassium chloride (KLOR-CON) packet 40 mEq 40 mEq As Needed 10/10/2017     Sig - Route: Take 40 mEq by mouth As Needed (potassium replacement, see admin instructions). - Oral    potassium chloride (MICRO-K) CR capsule 40 mEq 40 mEq As Needed 10/10/2017     Sig - Route: Take 4 capsules by mouth As Needed (potassium replacement.  see admin instructions). - Oral    potassium chloride 10 mEq in 100 mL IVPB 10 mEq Every 1 Hour PRN " 10/9/2017     Sig - Route: Infuse 100 mL into a venous catheter Every 1 (One) Hour As Needed (See admin Instructions.). - Intravenous    rifaximin (XIFAXAN) tablet 550 mg 550 mg Every 12 Hours Scheduled 10/9/2017     Sig - Route: Take 1 tablet by mouth Every 12 (Twelve) Hours. - Oral    sodium chloride 0.9 % flush 1-10 mL 1-10 mL As Needed 10/8/2017     Sig - Route: Infuse 1-10 mL into a venous catheter As Needed for Line Care. - Intravenous    sodium chloride 0.9 % flush 1-10 mL 1-10 mL As Needed 10/10/2017     Sig - Route: 1-10 mL by Intracatheter route As Needed for Line Care. - Intracatheter    Cosign for Ordering: Accepted by Christiano Peterson MD on 10/10/2017  3:10 PM    thiamine (VITAMIN B-1) tablet 100 mg 100 mg Daily 10/9/2017     Sig - Route: Take 1 tablet by mouth Daily. - Oral             Physical Therapy Notes (most recent note)      Sarah Marquez PT at 10/13/2017 10:38 AM  Version 1 of 1         Acute Care - Physical Therapy Treatment Note  [facility]     Patient Name: Tod Ambrose  : 1950  MRN: [id number]  Today's Date: 10/13/2017  Onset of Illness/Injury or Date of Surgery Date: 10/11/17  Date of Referral to PT: 10/11/17  Referring Physician: MD Nicholas    Admit Date: 10/8/2017    Visit Dx:    ICD-10-CM ICD-9-CM   1. Sepsis, due to unspecified organism A41.9 038.9     995.91   2. Lactic acidosis E87.2 276.2   3. Impaired functional mobility, balance, gait, and endurance Z74.09 V49.89   4. Dysphagia, unspecified type R13.10 787.20     Patient Active Problem List   Diagnosis   • Sepsis   • Altered mental status   • Diastolic dysfunction   • Hypoglycemia   • H/O Alcoholic hepatitis with ascites   • Elevated lactic acid level   • Hyponatremia   • Hepatic encephalopathy   • Alcohol abuse   • Mixed respiratory failure               Adult Rehabilitation Note       10/13/17 0841          Rehab Assessment/Intervention    Discipline physical therapist  -KR      Document Type therapy note  (daily note)  -KR      Subjective Information agree to therapy;no complaints  -KR      Patient Effort, Rehab Treatment good  -KR      Symptoms Noted During/After Treatment none  -KR      Precautions/Limitations fall precautions;oxygen therapy device and L/min   large abdominal hernia  -KR      Recorded by [KR] Sarah Marquez, PT      Vital Signs    Pre Systolic BP Rehab 94  -KR      Pre Treatment Diastolic BP 50  -KR      Post Systolic BP Rehab 95  -KR      Post Treatment Diastolic BP 63  -KR      Pretreatment Resp Rate (breaths/min) 79  -KR      Posttreatment Resp Rate (breaths/min) 91  -KR      Pre SpO2 (%) 94  -KR      O2 Delivery Pre Treatment supplemental O2  -KR      Post SpO2 (%) 90  -KR      O2 Delivery Post Treatment supplemental O2  -KR      Pre Patient Position Supine  -KR      Intra Patient Position Standing  -KR      Post Patient Position Sitting  -KR      Recorded by [JEANINE] Sarah Marquez, PT      Pain Assessment    Pain Assessment 0-10  -KR      Pain Score 0  -KR      Post Pain Score 0  -KR      Recorded by [JEANINE] Sarah Marquez, PT      Cognitive Assessment/Intervention    Current Cognitive/Communication Assessment impaired  -KR      Orientation Status oriented to;person;place  -KR      Follows Commands/Answers Questions able to follow single-step instructions;75% of the time;needs cueing;needs increased time  -KR      Personal Safety moderate impairment;decreased awareness, need for assist;decreased awareness, need for safety;decreased insight to deficits  -KR      Personal Safety Interventions fall prevention program maintained;gait belt;nonskid shoes/slippers when out of bed  -KR      Recorded by [JEANINE] Sarah Marquez, PT      Bed Mobility, Assessment/Treatment    Bed Mob, Supine to Sit, [place] moderate assist (50% patient effort);verbal cues required  -KR      Bed Mob, Sit to Supine, [place] not tested  -KR      Bed Mobility, Impairments strength decreased;impaired balance  -KR      Bed  Mobility, Comment VC's for sequencing  -KR      Recorded by [KR] Sarah Marquez PT      Transfer Assessment/Treatment    Transfers, Bed-Chair Ballinger moderate assist (50% patient effort);1 person + 1 person to manage equipment;verbal cues required  -KR      Transfers, Sit-Stand [place] moderate assist (50% patient effort);verbal cues required  -KR      Transfers, Stand-Sit [place] moderate assist (50% patient effort);verbal cues required  -KR      Transfer, Safety Issues step length decreased;weight-shifting ability decreased;balance decreased during turns  -KR      Transfer, Impairments strength decreased;impaired balance;coordination impaired  -KR      Transfer, Comment Pt unsteady with decreased balance upon standing; demonstrates significant foward flexed posture.   -KR      Recorded by [KR] Sarah Marquez PT      Gait Assessment/Treatment    Gait, Ballinger Level moderate assist (50% patient effort);1 person + 1 person to manage equipment  -KR      Gait, Distance (Feet) --   5 steps from bed to chair  -KR      Gait, Gait Pattern Analysis swing-to gait  -KR      Gait, Safety Issues step length decreased;weight-shifting ability decreased;balance decreased during turns  -KR      Gait, Impairments strength decreased;impaired balance;coordination impaired  -KR      Gait, Comment Pt with forward flexed posture; VC's unsuccessful. Pt unable to achieve full knee extension.   -KR      Recorded by [JEANINE] Sarah Marquez PT      Motor Skills/Interventions    Additional Documentation Balance Skills Training (Group)  -KR      Recorded by [JEANINE] Sarah Marquez PT      Balance Skills Training    Sitting-Level of Assistance Minimum assistance  -KR      Sitting-Balance Support Right upper extremity supported;Left upper extremity supported;Feet supported  -KR      Standing-Level of Assistance Moderate assistance  -KR      Static Standing Balance Support Left upper extremity supported  -KR      Gait Balance-Level  of Assistance Moderate assistance  -KR      Gait Balance Support Left upper extremity supported  -KR      Recorded by [JEANINE] Sarah Marquez, PT      Therapy Exercises    Bilateral Lower Extremities AROM:;sitting;ankle pumps/circles;hip flexion;LAQ;10 reps  -KR      Recorded by [JEANINE] Sarah Marquez PT      Positioning and Restraints    Pre-Treatment Position in bed  -KR      Post Treatment Position chair  -KR      In Chair notified nsg;reclined;call light within reach;encouraged to call for assist;exit alarm on;RLE elevated;LLE elevated  -KR      Recorded by [JEANINE] Sarah Marquez, PT        User Key  (r) = Recorded By, (t) = Taken By, (c) = Cosigned By    Initials Name Effective Dates    JEANINE Marquez, PT 09/25/17 -                 IP PT Goals       10/13/17 1033 10/12/17 1408       Bed Mobility PT LTG    Bed Mobility PT LTG, Date Established  10/12/17  -LS     Bed Mobility PT LTG, Time to Achieve  2 wks  -LS     Bed Mobility PT LTG, Activity Type  supine to sit/sit to supine  -LS     Bed Mobility PT LTG, Ashley Level  minimum assist (75% patient effort)  -LS     Bed Mobility PT LTG, Outcome goal ongoing  -KR      Transfer Training PT LTG    Transfer Training PT LTG, Date Established  10/12/17  -LS     Transfer Training PT LTG, Time to Achieve  2 wks  -LS     Transfer Training PT LTG, Activity Type  sit to stand/stand to sit  -LS     Transfer Training PT LTG, Ashley Level  minimum assist (75% patient effort)  -LS     Transfer Training PT LTG, Assist Device  walker, rolling  -LS     Transfer Training PT LTG, Outcome goal ongoing  -KR      Gait Training PT LTG    Gait Training Goal PT LTG, Date Established  10/12/17  -LS     Gait Training Goal PT LTG, Time to Achieve  2 wks  -LS     Gait Training Goal PT LTG, Ashley Level  minimum assist (75% patient effort);2 person assist required  -LS     Gait Training Goal PT LTG, Assist Device  walker, rolling  -LS     Gait Training Goal PT LTG, Distance to  Achieve  50  -LS     Gait Training Goal PT LTG, Outcome goal ongoing  -KR        User Key  (r) = Recorded By, (t) = Taken By, (c) = Cosigned By    Initials Name Provider Type    LS Alyce Pearce, PT Physical Therapist    JEANINE Marquez, PT Physical Therapist          Physical Therapy Education     Title: PT OT SLP Therapies (Active)     Topic: Physical Therapy (Active)     Point: Mobility training (Active)    Learning Progress Summary    Learner Readiness Method Response Comment Documented by Status   Patient Acceptance E NR  KR 10/13/17 1033 Active    Acceptance E,D NR  LS 10/12/17 1408 Active               Point: Body mechanics (Active)    Learning Progress Summary    Learner Readiness Method Response Comment Documented by Status   Patient Acceptance E NR  KR 10/13/17 1033 Active    Acceptance E,D NR  LS 10/12/17 1408 Active               Point: Precautions (Active)    Learning Progress Summary    Learner Readiness Method Response Comment Documented by Status   Patient Acceptance E NR  KR 10/13/17 1033 Active    Acceptance E,D NR  LS 10/12/17 1408 Active                      User Key     Initials Effective Dates Name Provider Type Discipline    LS 06/19/15 -  Alyce Pearce, PT Physical Therapist PT    KR 09/25/17 -  Sarah Marquez, PT Physical Therapist PT                    PT Recommendation and Plan  Anticipated Discharge Disposition: home with assist  PT Frequency: daily  Plan of Care Review  Plan Of Care Reviewed With: patient  Progress: improving  Outcome Summary/Follow up Plan: Pt STS from EOB and t/f to chair with modA. Pt exhibits forward flexed posture and knees buckling during transfers. Pt limited by cognitive deficit, but able to follow commands majority of time.           Outcome Measures       10/13/17 0841 10/12/17 1312       How much help from another person do you currently need...    Turning from your back to your side while in flat bed without using bedrails? 2  -KR 2  -LS     Moving from  lying on back to sitting on the side of a flat bed without bedrails? 2  -KR 2  -LS     Moving to and from a bed to a chair (including a wheelchair)? 2  -KR 2  -LS     Standing up from a chair using your arms (e.g., wheelchair, bedside chair)? 2  -KR 2  -LS     Climbing 3-5 steps with a railing? 1  -KR 1  -LS     To walk in hospital room? 1  -KR 1  -LS     AM-PAC 6 Clicks Score 10  -KR 10  -LS     Functional Assessment    Outcome Measure Options AM-PAC 6 Clicks Basic Mobility (PT)  -KR AM-PAC 6 Clicks Basic Mobility (PT)  -LS       User Key  (r) = Recorded By, (t) = Taken By, (c) = Cosigned By    Initials Name Provider Type    ASIA Pearce, PT Physical Therapist    KR Sarah Marquez, PT Physical Therapist           Time Calculation:         PT Charges       10/13/17 1038          Time Calculation    Start Time 0841  -KR      PT Received On 10/13/17  -KR      PT Goal Re-Cert Due Date 10/22/17  -KR      Time Calculation- PT    Total Timed Code Minutes- PT 25 minute(s)  -KR        User Key  (r) = Recorded By, (t) = Taken By, (c) = Cosigned By    Initials Name Provider Type    KR Sarah Marquez, PT Physical Therapist          Therapy Charges for Today     Code Description Service Date Service Provider Modifiers Qty    71126484783 HC PT THER PROC EA 15 MIN 10/13/2017 Sarah Marquez, PT GP 2    66315148343 HC PT THER SUPP EA 15 MIN 10/13/2017 Sarah Marquez, PT GP 2          PT G-Codes  Outcome Measure Options: AM-PAC 6 Clicks Basic Mobility (PT)    Carol Marquez PT  10/13/2017            Electronically signed by Sakina Hernandez, PT at 10/13/2017 12:00 PM           Occupational Therapy Notes (most recent note)      Connie Cervantes, OT at 10/12/2017  9:18 AM  Version 1 of 1         ICU Rounds: PT evaluation pending this date.     Electronically signed by Connie Cervantes OT at 10/12/2017  9:18 AM

## 2017-10-13 NOTE — THERAPY TREATMENT NOTE
Acute Care - Physical Therapy Treatment Note  [facility]     Patient Name: Tod Ambrose  : 1950  MRN: [id number]  Today's Date: 10/13/2017  Onset of Illness/Injury or Date of Surgery Date: 10/11/17  Date of Referral to PT: 10/11/17  Referring Physician: MD Nicholas    Admit Date: 10/8/2017    Visit Dx:    ICD-10-CM ICD-9-CM   1. Sepsis, due to unspecified organism A41.9 038.9     995.91   2. Lactic acidosis E87.2 276.2   3. Impaired functional mobility, balance, gait, and endurance Z74.09 V49.89   4. Dysphagia, unspecified type R13.10 787.20     Patient Active Problem List   Diagnosis   • Sepsis   • Altered mental status   • Diastolic dysfunction   • Hypoglycemia   • H/O Alcoholic hepatitis with ascites   • Elevated lactic acid level   • Hyponatremia   • Hepatic encephalopathy   • Alcohol abuse   • Mixed respiratory failure               Adult Rehabilitation Note       10/13/17 0841          Rehab Assessment/Intervention    Discipline physical therapist  -KR      Document Type therapy note (daily note)  -KR      Subjective Information agree to therapy;no complaints  -KR      Patient Effort, Rehab Treatment good  -KR      Symptoms Noted During/After Treatment none  -KR      Precautions/Limitations fall precautions;oxygen therapy device and L/min   large abdominal hernia  -KR      Recorded by [KR] Sarah Marquez, PT      Vital Signs    Pre Systolic BP Rehab 94  -KR      Pre Treatment Diastolic BP 50  -KR      Post Systolic BP Rehab 95  -KR      Post Treatment Diastolic BP 63  -KR      Pretreatment Resp Rate (breaths/min) 79  -KR      Posttreatment Resp Rate (breaths/min) 91  -KR      Pre SpO2 (%) 94  -KR      O2 Delivery Pre Treatment supplemental O2  -KR      Post SpO2 (%) 90  -KR      O2 Delivery Post Treatment supplemental O2  -KR      Pre Patient Position Supine  -KR      Intra Patient Position Standing  -KR      Post Patient Position Sitting  -KR      Recorded by [KR] Sarah Marquez PT      Pain  Assessment    Pain Assessment 0-10  -KR      Pain Score 0  -KR      Post Pain Score 0  -KR      Recorded by [KR] Sarah Marquez, PT      Cognitive Assessment/Intervention    Current Cognitive/Communication Assessment impaired  -KR      Orientation Status oriented to;person;place  -KR      Follows Commands/Answers Questions able to follow single-step instructions;75% of the time;needs cueing;needs increased time  -KR      Personal Safety moderate impairment;decreased awareness, need for assist;decreased awareness, need for safety;decreased insight to deficits  -KR      Personal Safety Interventions fall prevention program maintained;gait belt;nonskid shoes/slippers when out of bed  -KR      Recorded by [KR] Sarah Marquez, PT      Bed Mobility, Assessment/Treatment    Bed Mob, Supine to Sit, [place] moderate assist (50% patient effort);verbal cues required  -KR      Bed Mob, Sit to Supine, [place] not tested  -KR      Bed Mobility, Impairments strength decreased;impaired balance  -KR      Bed Mobility, Comment VC's for sequencing  -KR      Recorded by [KR] Sarah Marquez, PT      Transfer Assessment/Treatment    Transfers, Bed-Chair [place] moderate assist (50% patient effort);1 person + 1 person to manage equipment;verbal cues required  -KR      Transfers, Sit-Stand [place] moderate assist (50% patient effort);verbal cues required  -KR      Transfers, Stand-Sit [place] moderate assist (50% patient effort);verbal cues required  -KR      Transfer, Safety Issues step length decreased;weight-shifting ability decreased;balance decreased during turns  -KR      Transfer, Impairments strength decreased;impaired balance;coordination impaired  -KR      Transfer, Comment Pt unsteady with decreased balance upon standing; demonstrates significant foward flexed posture.   -KR      Recorded by [KR] Sarah Marquez, PT      Gait Assessment/Treatment    Gait, Ballard Level moderate assist (50% patient  effort);1 person + 1 person to manage equipment  -KR      Gait, Distance (Feet) --   5 steps from bed to chair  -KR      Gait, Gait Pattern Analysis swing-to gait  -KR      Gait, Safety Issues step length decreased;weight-shifting ability decreased;balance decreased during turns  -KR      Gait, Impairments strength decreased;impaired balance;coordination impaired  -KR      Gait, Comment Pt with forward flexed posture; VC's unsuccessful. Pt unable to achieve full knee extension.   -KR      Recorded by [MEET Marquez PT      Motor Skills/Interventions    Additional Documentation Balance Skills Training (Group)  -KR      Recorded by [MEET Marquez PT      Balance Skills Training    Sitting-Level of Assistance Minimum assistance  -KR      Sitting-Balance Support Right upper extremity supported;Left upper extremity supported;Feet supported  -KR      Standing-Level of Assistance Moderate assistance  -KR      Static Standing Balance Support Left upper extremity supported  -KR      Gait Balance-Level of Assistance Moderate assistance  -KR      Gait Balance Support Left upper extremity supported  -KR      Recorded by [MEET Marquez PT      Therapy Exercises    Bilateral Lower Extremities AROM:;sitting;ankle pumps/circles;hip flexion;LAQ;10 reps  -KR      Recorded by [MEET Marquez PT      Positioning and Restraints    Pre-Treatment Position in bed  -KR      Post Treatment Position chair  -KR      In Chair notified nsg;reclined;call light within reach;encouraged to call for assist;exit alarm on;RLE elevated;LLE elevated  -KR      Recorded by [MEET Marquez PT        User Key  (r) = Recorded By, (t) = Taken By, (c) = Cosigned By    Initials Name Effective Dates    JEANINE Marquez PT 09/25/17 -                 IP PT Goals       10/13/17 1033 10/12/17 1408       Bed Mobility PT LTG    Bed Mobility PT LTG, Date Established  10/12/17  -LS     Bed Mobility PT LTG, Time to Achieve  2 wks  -LS     Bed  Mobility PT LTG, Activity Type  supine to sit/sit to supine  -LS     Bed Mobility PT LTG, [place] Level  minimum assist (75% patient effort)  -LS     Bed Mobility PT LTG, Outcome goal ongoing  -KR      Transfer Training PT LTG    Transfer Training PT LTG, Date Established  10/12/17  -LS     Transfer Training PT LTG, Time to Achieve  2 wks  -LS     Transfer Training PT LTG, Activity Type  sit to stand/stand to sit  -LS     Transfer Training PT LTG, [place] Level  minimum assist (75% patient effort)  -LS     Transfer Training PT LTG, Assist Device  walker, rolling  -LS     Transfer Training PT LTG, Outcome goal ongoing  -KR      Gait Training PT LTG    Gait Training Goal PT LTG, Date Established  10/12/17  -LS     Gait Training Goal PT LTG, Time to Achieve  2 wks  -LS     Gait Training Goal PT LTG, [place] Level  minimum assist (75% patient effort);2 person assist required  -LS     Gait Training Goal PT LTG, Assist Device  walker, rolling  -LS     Gait Training Goal PT LTG, Distance to Achieve  50  -LS     Gait Training Goal PT LTG, Outcome goal ongoing  -KR        User Key  (r) = Recorded By, (t) = Taken By, (c) = Cosigned By    Initials Name Provider Type    LS Alyce Pearce, PT Physical Therapist    JEANINE Marquez, PT Physical Therapist          Physical Therapy Education     Title: PT OT SLP Therapies (Active)     Topic: Physical Therapy (Active)     Point: Mobility training (Active)    Learning Progress Summary    Learner Readiness Method Response Comment Documented by Status   Patient Acceptance E NR  KR 10/13/17 1033 Active    Acceptance E,D NR  LS 10/12/17 1408 Active               Point: Body mechanics (Active)    Learning Progress Summary    Learner Readiness Method Response Comment Documented by Status   Patient Acceptance E NR  KR 10/13/17 1033 Active    Acceptance E,D NR  LS 10/12/17 1408 Active               Point: Precautions (Active)    Learning Progress Summary    Learner Readiness  Method Response Comment Documented by Status   Patient Acceptance E NR  KR 10/13/17 1033 Active    Acceptance E,D NR  LS 10/12/17 1408 Active                      User Key     Initials Effective Dates Name Provider Type Discipline    LS 06/19/15 -  Alyce Pearce, PT Physical Therapist PT    KR 09/25/17 -  Sarah Marquez PT Physical Therapist PT                    PT Recommendation and Plan  Anticipated Discharge Disposition: home with assist  PT Frequency: daily  Plan of Care Review  Plan Of Care Reviewed With: patient  Progress: improving  Outcome Summary/Follow up Plan: Pt STS from EOB and t/f to chair with modA. Pt exhibits forward flexed posture and knees buckling during transfers. Pt limited by cognitive deficit, but able to follow commands majority of time.           Outcome Measures       10/13/17 0841 10/12/17 1312       How much help from another person do you currently need...    Turning from your back to your side while in flat bed without using bedrails? 2  -KR 2  -LS     Moving from lying on back to sitting on the side of a flat bed without bedrails? 2  -KR 2  -LS     Moving to and from a bed to a chair (including a wheelchair)? 2  -KR 2  -LS     Standing up from a chair using your arms (e.g., wheelchair, bedside chair)? 2  -KR 2  -LS     Climbing 3-5 steps with a railing? 1  -KR 1  -LS     To walk in hospital room? 1  -KR 1  -LS     AM-PAC 6 Clicks Score 10  -KR 10  -LS     Functional Assessment    Outcome Measure Options AM-PAC 6 Clicks Basic Mobility (PT)  -KR AM-PAC 6 Clicks Basic Mobility (PT)  -LS       User Key  (r) = Recorded By, (t) = Taken By, (c) = Cosigned By    Initials Name Provider Type    LS Alyce Pearce, PT Physical Therapist    JEANINE Marquez, PT Physical Therapist           Time Calculation:         PT Charges       10/13/17 1038          Time Calculation    Start Time 0841  -KR      PT Received On 10/13/17  -KR      PT Goal Re-Cert Due Date 10/22/17  -KR      Time Calculation-  PT    Total Timed Code Minutes- PT 25 minute(s)  -JEANINE        User Key  (r) = Recorded By, (t) = Taken By, (c) = Cosigned By    Initials Name Provider Type    JEANINE Marquez PT Physical Therapist          Therapy Charges for Today     Code Description Service Date Service Provider Modifiers Qty    49406550160 HC PT THER PROC EA 15 MIN 10/13/2017 Sarah Marquez, PT GP 2    14427186733 HC PT THER SUPP EA 15 MIN 10/13/2017 Sarah Marquez, PT GP 2          PT G-Codes  Outcome Measure Options: AM-PAC 6 Clicks Basic Mobility (PT)    Carol Marquez PT  10/13/2017

## 2017-10-14 NOTE — PLAN OF CARE
"Problem: Pressure Ulcer Risk (Antwan Scale) (Adult,Obstetrics,Pediatric)  Goal: Skin Integrity  Outcome: Ongoing (interventions implemented as appropriate)    Problem: Acute Alcohol Withdrawal Syndrome, Risk For/Actual (Adult)  Goal: Signs and Symptoms of Listed Potential Problems Will be Absent or Manageable (Acute Alcohol Withdrawal Syndrome, Risk For/Actual)  Outcome: Ongoing (interventions implemented as appropriate)    Problem: Patient Care Overview (Adult)  Goal: Plan of Care Review  Outcome: Ongoing (interventions implemented as appropriate)    10/13/17 1033 10/13/17 1045 10/14/17 1606   Coping/Psychosocial Response Interventions   Plan Of Care Reviewed With --  patient --    Patient Care Overview   Progress improving --  --    Outcome Evaluation   Outcome Summary/Follow up Plan --  --  Patient new to floor from icu. Conditional code. Noted tachycardia, per previous nurse is patient \"normal.\" Sleeping upon arrival due to recent po valium. Noted skin breakdown as charted in groin. Disoriented x3. Only oriented to person at times.        Goal: Adult Individualization and Mutuality  Outcome: Ongoing (interventions implemented as appropriate)  Goal: Discharge Needs Assessment  Outcome: Ongoing (interventions implemented as appropriate)    Problem: Sepsis (Adult)  Goal: Signs and Symptoms of Listed Potential Problems Will be Absent or Manageable (Sepsis)  Outcome: Ongoing (interventions implemented as appropriate)      "

## 2017-10-14 NOTE — PROGRESS NOTES
Intensive Care Follow-up      LOS: 6 days     Mr. Tod Ambrose, 66 y.o. male is followed for: Altered mental status     Subjective - Interval History     Much more awake and alert  Appears appropriate and oriented  Still having frequent bowel movements.  Continues to receive lactulose    The patient's relevant past medical, surgical and social history were reviewed and updated in Epic as appropriate.     Objective     Infusions:    dexmedetomidine 0.2-1.5 mcg/kg/hr Last Rate: Stopped (10/13/17 1400)   norepinephrine 0.02-0.3 mcg/kg/min Last Rate: Stopped (10/11/17 1545)   sodium chloride 50 mL/hr Last Rate: 50 mL/hr (10/13/17 1843)     Medications:    ampicillin 1 g Intravenous Q6H   bacitracin  Topical Q12H   diazePAM 10 mg Oral Q8H   famotidine 20 mg Oral BID   folic acid 1 mg Oral Daily   lactulose 20 g Oral BID   multivitamin and minerals 15 mL Oral Daily   nystatin  Topical Q12H   rifAXIMin 550 mg Oral Q12H   thiamine 100 mg Oral Daily     Intake/Output       10/13/17 0700 - 10/14/17 0659 10/14/17 0700 - 10/15/17 0659    Intake (ml) 1976 --    Output (ml) 670 120    Net (ml) 1306 -120        Vital Sign Min/Max for last 24 hours  Temp  Min: 97.4 °F (36.3 °C)  Max: 98.7 °F (37.1 °C)   BP  Min: 93/65  Max: 171/95   Pulse  Min: 87  Max: 124   Resp  Min: 16  Max: 24   SpO2  Min: 88 %  Max: 100 %   Flow (L/min)  Min: 50  Max: 50        Physical Exam:   GENERAL: Awake, no distress   HEENT: No adenopathy or thyromegaly   LUNGS: Decreased breath sounds without wheezes   HEART: Regular rate and rhythm without murmurs   ABDOMEN: Obese, soft, nontender, easily reducible hernia   EXTREMITIES: Trace edema, no cyanosis   NEURO/PSYCH: Awake and alert.  Follows commands.  No deficits      Results from last 7 days  Lab Units 10/13/17  0446 10/12/17  0409 10/11/17  0426   WBC 10*3/mm3 10.00 9.76 11.30*   HEMOGLOBIN g/dL 13.0* 12.9* 13.8   PLATELETS 10*3/mm3 89* 94* 108*       Results from last 7 days  Lab Units 10/13/17  1320  10/12/17  0409 10/11/17  2338 10/11/17  1323 10/11/17  0426  10/10/17  0141   SODIUM mmol/L 145 149*  --   --  146  --  142   POTASSIUM mmol/L 3.9 3.7  --   --  3.8  < > 2.9*  2.9*   CO2 mmol/L 35.0* 36.0*  --   --  35.0*  --  31.0   BUN mg/dL 23 26*  --   --  27*  --  43*   CREATININE mg/dL 0.50* 0.60  --   --  0.70  --  0.70   MAGNESIUM mg/dL 2.0  --   --   --  2.0  --  2.3   PHOSPHORUS mg/dL 2.2*  --  1.9* 1.4* 1.6*  1.6*  < > 1.9*   GLUCOSE mg/dL 118* 126*  --   --  145*  --  114*   < > = values in this interval not displayed.  Estimated Creatinine Clearance: 105.1 mL/min (by C-G formula based on Cr of 0.5).            Results from last 7 days  Lab Units 10/08/17  1727   PH, ARTERIAL pH units 7.439   PCO2, ARTERIAL mm Hg 48.9*   PO2 ART mm Hg 62.3*     Lab Results   Component Value Date    LACTATE 1.1 10/10/2017          Images: Most recent chest x-ray without consolidation or effusions    I reviewed the patient's results and images.     Impression      Hospital Problem List     * (Principal)Altered mental status    Sepsis    Elevated lactic acid level    Hepatic encephalopathy    H/O Alcoholic hepatitis with ascites    Alcohol abuse    Mixed respiratory failure    Diastolic dysfunction    Hypoglycemia    Hyponatremia               Plan        Continue lactulose and Macario Susana  Continue to push mobilization  Complete antibiotics for UTI.  Automatic stop order in place  Transfer to the floor soon    Plan of care and goals reviewed with mulitdisciplinary team at daily rounds   I discussed the patient's findings and my recommendations with patient and nursing staff       NATHANAEL Peterson MD  Pulmonary and Critical Care Medicine

## 2017-10-15 NOTE — PLAN OF CARE
Problem: Patient Care Overview (Adult)  Goal: Plan of Care Review  Outcome: Ongoing (interventions implemented as appropriate)    10/13/17 1033 10/14/17 2000 10/15/17 0245   Coping/Psychosocial Response Interventions   Plan Of Care Reviewed With --  patient --    Patient Care Overview   Progress improving --  --    Outcome Evaluation   Outcome Summary/Follow up Plan --  --  Patient resting well. Conditional code. Breath sounds very coarse. Oriented to person only.        Goal: Adult Individualization and Mutuality  Outcome: Ongoing (interventions implemented as appropriate)  Goal: Discharge Needs Assessment  Outcome: Ongoing (interventions implemented as appropriate)    Problem: Sepsis (Adult)  Goal: Signs and Symptoms of Listed Potential Problems Will be Absent or Manageable (Sepsis)  Outcome: Ongoing (interventions implemented as appropriate)    Problem: Respiratory Insufficiency (Adult)  Goal: Identify Related Risk Factors and Signs and Symptoms  Outcome: Ongoing (interventions implemented as appropriate)  Goal: Acid/Base Balance  Outcome: Ongoing (interventions implemented as appropriate)  Goal: Effective Ventilation  Outcome: Ongoing (interventions implemented as appropriate)    Problem: Wound, Traumatic, Nonburn (Adult)  Goal: Signs and Symptoms of Listed Potential Problems Will be Absent or Manageable (Wound, Traumatic, Nonburn)  Outcome: Ongoing (interventions implemented as appropriate)

## 2017-10-15 NOTE — PLAN OF CARE
Problem: Pressure Ulcer Risk (Antwan Scale) (Adult,Obstetrics,Pediatric)  Goal: Identify Related Risk Factors and Signs and Symptoms  Outcome: Ongoing (interventions implemented as appropriate)    10/11/17 0316   Pressure Ulcer Risk (Antwan Scale)   Related Risk Factors (Pressure Ulcer Risk (Antwan Scale)) body weight extremes;critical care admission;mechanical forces;medical devices;mobility impaired       Goal: Skin Integrity  Outcome: Ongoing (interventions implemented as appropriate)    10/15/17 1602   Pressure Ulcer Risk (Antwan Scale) (Adult,Obstetrics,Pediatric)   Skin Integrity making progress toward outcome         Problem: Acute Alcohol Withdrawal Syndrome, Risk For/Actual (Adult)  Goal: Signs and Symptoms of Listed Potential Problems Will be Absent or Manageable (Acute Alcohol Withdrawal Syndrome, Risk For/Actual)  Outcome: Ongoing (interventions implemented as appropriate)    10/15/17 1602   Acute Alcohol Withdrawal Syndrome, Risk For/Actual   Problems Assessed (Alcohol Withdrawal Syndrome) all   Problems Present (Alcohol Withdrawal Syndrome) none         Problem: Patient Care Overview (Adult)  Goal: Plan of Care Review  Outcome: Ongoing (interventions implemented as appropriate)    10/15/17 1602   Coping/Psychosocial Response Interventions   Plan Of Care Reviewed With patient;sibling   Patient Care Overview   Progress no change         Problem: Sepsis (Adult)  Goal: Signs and Symptoms of Listed Potential Problems Will be Absent or Manageable (Sepsis)  Outcome: Ongoing (interventions implemented as appropriate)    10/15/17 1602   Sepsis   Problems Assessed (Sepsis) all   Problems Present (Sepsis) situational response         Problem: Wound, Traumatic, Nonburn (Adult)  Goal: Signs and Symptoms of Listed Potential Problems Will be Absent or Manageable (Wound, Traumatic, Nonburn)    10/15/17 1602   Wound, Traumatic, Nonburn   Problems Assessed (Wound) all   Problems Present (Wound) skin  breakdown;undernutrition

## 2017-10-15 NOTE — PROGRESS NOTES
"      HOSPITALIST DAILY PROGRESS NOTE    Chief Complaint: FU AMS    Subjective   SUBJECTIVE/OVERNIGHT EVENTS   No acute events ON. Patient seen after 1 dose of Ativan this morning.  He was very drowsy but does follow commands.    Review of Systems:  Nodded when he was asked if he was in pain    Objective   OBJECTIVE   I have reviewed the vital signs.  /69  Pulse 116  Temp 97.2 °F (36.2 °C)  Resp 20  Ht 64\" (162.6 cm)  Wt 250 lb (113 kg)  SpO2 96%  BMI 42.91 kg/m2    Physical Exam:  General - NAD, pt was laying in bed comfortably  HEENT - EOMI, PERRL, oropharynx clear, hearing intact  CVS - RRR, S1 S2, no rubs, gallops or murmurs, 2s cap refill, no peripheral edema, 2+ pulses  Resp - CTAB, no crackles and wheezes   GI - +BS, soft, ND, grimaces with palpation of abdomen diffusely  MSK - No joint pain or joint edema  Neuro - Awake and oriented, follows commands, interactive, moves all extremities equally    Results:  I have reviewed the labs, culture data, radiology results, and diagnostic studies.      Results from last 7 days  Lab Units 10/15/17  0532 10/13/17  0446 10/12/17  0409   WBC 10*3/mm3 13.03* 10.00 9.76   HEMOGLOBIN g/dL 13.8 13.0* 12.9*   HEMATOCRIT % 41.0 37.9* 37.3*   PLATELETS 10*3/mm3 87* 89* 94*       Results from last 7 days  Lab Units 10/15/17  0532   SODIUM mmol/L 145   POTASSIUM mmol/L 4.1   CHLORIDE mmol/L 103   CO2 mmol/L 29.0   BUN mg/dL 13   CREATININE mg/dL 0.60   GLUCOSE mg/dL 109*   CALCIUM mg/dL 8.2*       Culture Data:  Cultures:    Blood Culture   Date Value Ref Range Status   10/08/2017 No growth at 5 days  Final   10/08/2017 No growth at 5 days  Final     Urine Culture   Date Value Ref Range Status   10/08/2017 No growth at 2 days  Final   10/08/2017 70,000-80,000 CFU/mL Enterococcus faecalis (A)  Final       I have reviewed the medications.    ampicillin 1 g Intravenous Q6H   bacitracin  Topical Q12H   famotidine 20 mg Oral BID   folic acid 1 mg Oral Daily   lactulose 20 " g Oral TID   multivitamin and minerals 15 mL Oral Daily   nystatin  Topical Q12H   rifAXIMin 550 mg Oral Q12H   thiamine 100 mg Oral Daily       Assessment/Plan   ASSESSMENT/PLAN    Principal Problem:    Altered mental status  Active Problems:    Sepsis    Diastolic dysfunction    Hypoglycemia    H/O Alcoholic hepatitis with ascites    Elevated lactic acid level    Hyponatremia    Hepatic encephalopathy    Alcohol abuse    Mixed respiratory failure    # Acute hypoxemic and hypercapnic respiratory failure: Currently on high flow  # Acute liver failure: INR>1.5, HE, transaminitis with no cirrhosis. Too far out to benefit from NAC  # HE: Ammonia 141 on admission. Continue rifaximin and lactulose PO and enemas  # E faecalis UTI: Continue ampicillin   # Alcohol withdrawal: Daily folate and thiamine  # Alcoholic hepatitis: Fatty liver on CT    - Pt has been drowsy all day after 1x ativan this AM. Likely benzo buildup in his system given transaminitis  - Will discontinue valium 10 mg every 8hrs that was scheduled  - Start CIWA protocol on ativan  - May be too drowsy to take PO lactulose.  Added lactulose enemas to titrate to 3-4 bowel movements daily    DVT PPx: Hold for thrombocytopenia  I expect patient to be discharged in:  ??    Karolina Garcia MD  10/15/17  6:56 PM

## 2017-10-15 NOTE — PLAN OF CARE
Problem: Respiratory Insufficiency (Adult)  Intervention: Provide Oxygenation/Ventilation/Perfusion Support    10/15/17 1558   Positioning   Head Of Bed (HOB) Position HOB at 30-45 degrees   Safety Interventions   Medication Review/Management medications reviewed   Activity   Activity Type activity adjusted per tolerance   Respiratory Interventions   Airway/Ventilation Management calming measures promoted;pulmonary hygiene promoted       Intervention: Optimize/Manage Energy Expenditure    10/15/17 1558   Coping/Psychosocial Interventions   Environmental Support calm environment promoted   Pain/Comfort/Sleep Interventions   Sleep/Rest Enhancement family presence promoted;regular sleep/rest pattern promoted;relaxation techniques promoted   Nutrition Interventions   Oral Nutrition Promotion calorie dense foods provided         Goal: Identify Related Risk Factors and Signs and Symptoms    10/15/17 1558   Respiratory Insufficiency   Related Risk Factors (Respiratory Insufficiency) activity intolerance;obesity;knowledge deficit   Signs and Symptoms (Respiratory Insufficiency) abnormal breath sounds;level of consciousness change       Goal: Acid/Base Balance    10/15/17 1558   Respiratory Insufficiency (Adult)   Acid/Base Balance making progress toward outcome       Goal: Effective Ventilation    10/15/17 1558   Respiratory Insufficiency (Adult)   Effective Ventilation making progress toward outcome

## 2017-10-16 NOTE — PROGRESS NOTES
Adult Nutrition  Assessment/PES    Patient Name:  Tod Ambrose  YOB: 1950  MRN: [id number]  Admit Date:  10/8/2017    Assessment Date:  10/16/2017    Comments:  RD PO follow up. Pt with minimal po intake x 3 days. SLP to re evaluate today for safe swallow recommendations. RD to continue to follow.     Additional Recommendations:  1. Re start EN if pt deemed unsafe for PO, or pt continues to be unable to meet needs via PO route.           Reason for Assessment       10/16/17 1331    Reason for Assessment    Reason For Assessment/Visit follow up protocol    Time Spent (min) 30    Diagnosis Diagnosis   Per notes this adm/MD diagnosis list noted    Infectious Disease UTI    Metabolic/ICU Hypernatremia    Neurological AMS    Other diagnosis Dysphagia   Principal Problem:    Altered mental status  Active Problems:    Sepsis    Diastolic dysfunction    Hypoglycemia    H/O Alcoholic hepatitis with ascites    Elevated lactic acid level    Hyponatremia    Hepatic encephalopathy    Alcohol abuse    Mixed respiratory failure               Nutrition/Diet History       10/16/17 1334    Nutrition/Diet History    Reported/Observed By RN;Patient    Other Pt asleep at time of RD visit, sitting up in chair with lunch tray (untouched). Pt aroses a bit but seems confused, inappropriate in answers to RD questions. RD spoke with pts RN who stated pt mental status has not improved, AMS continues. RN also states pts showing S/S  of aspiration with NTL, RN states MD aware and consult for SLP to be placed. RN states he is monitoring pt closely-no need to make NPO at this time.  RD spoke with SLP via phone who stated plans to see pt today and re evaluate. RN also included pts mental status is adversely affecting pts PO intake.         Per SLP 10/13  FEES completed: Mild oropharyngeal dysphagia. Prolonged though adequate oral manipulation and transit of bolus with solids. Delayed swallow initiation to level of the pyriform  sinuses with large sips of thin liquids. No penetration or aspiration observed with any consistency during FEES. Though episodes of coughing with large/consecutive sips of liquid both before and after FEES. Highly suspect aspiration of consecutive sips of thin liquids 2' delayed swallow. Recommend: Dysphagia Level 4 Mech-soft diet and nectar-thick liquids. Ok for ice chips. Meds whole in applesauce. SLP will follow to work on safe tolerance of thin liquids and upgrade as appropriate in tx. Will continue to follow.         Labs/Tests/Procedures/Meds       10/16/17 1339    Labs/Tests/Procedures/Meds    Labs/Tests Review Reviewed;Na+;Pre Albumin;C-React PRO;NH3    Swallow eval status Done   FEES 10/13    Medication Review Reviewed, pertinent   gtt:D5W @125mL/hr Other meds: lactulose, B1-100mg, MVI, folic acid     Results from last 7 days  Lab Units 10/16/17  1228 10/16/17  0552   SODIUM mmol/L 148* 150*  150*   POTASSIUM mmol/L 3.9 4.0  4.0   CHLORIDE mmol/L 107 104  104   CO2 mmol/L 37.0* 39.0*  39.0*   BUN mg/dL 14 14  14   CREATININE mg/dL 0.50* 0.60  0.60   CALCIUM mg/dL 8.7 8.7  8.7   BILIRUBIN mg/dL  --  3.0*  3.0*   ALK PHOS U/L  --  254*  254*   ALT (SGPT) U/L  --  68*  68*   AST (SGOT) U/L  --  202*  202*   GLUCOSE mg/dL 145* 107*  107*       Intake & Output (last day)       10/15 0701 - 10/16 0700 10/16 0701 - 10/17 0700    P.O. 120 100    I.V. (mL/kg)  1683 (11.2)    Total Intake(mL/kg) 120 (0.8) 1783 (11.9)    Urine (mL/kg/hr) 825 (0.2) 150 (0.1)    Stool      Total Output 825 150    Net -705 +1633                         Nutrition Prescription Ordered       10/16/17 1341    Nutrition Prescription PO    Current PO Diet Dysphagia    Dysphagia Level 4  Mechanical soft no mixed consistencies    Fluid Consistency Nectar/syrup thick       Boost Plus TID         Evaluation of Received Nutrient/Fluid Intake       10/16/17 1341    PO Evaluation    Number of Meals 3   Only meals recorded since adm    %  PO Intake 13    EN Evaluation    Number of Days EN Intake Evaluated --   RD notes EN d/c'd 10/13 s/p FEES            Problem/Interventions:          Problem 2       10/16/17 1342    Nutrition Diagnoses Problem 2    Problem 2 Inadequate Intake/Infusion    Inadequate Intake Type Oral    Etiology (related to) --   AMS/clinical condition    Signs/Symptoms (evidenced by) PO Intake    Percent (%) intake recorded 13 %    Over number of meals 3                  Intervention Goal       10/16/17 1343    Intervention Goal    General Nutrition support treatment    PO --   SLP to re evaluate for safe swallow recs            Nutrition Intervention       10/16/17 1343    Nutrition Intervention    RD/Tech Action Follow Tx progress;Care plan reviewd              Education/Evaluation       10/16/17 1343    Monitor/Evaluation    Monitor Per protocol;I&O;PO intake;Supplement intake;Pertinent labs        Electronically signed by:  Ella Enrique RDN, LD  10/16/17 1:44 PM

## 2017-10-16 NOTE — PLAN OF CARE
Problem: Patient Care Overview (Adult)  Goal: Plan of Care Review  Outcome: Ongoing (interventions implemented as appropriate)    10/16/17 1509   Coping/Psychosocial Response Interventions   Plan Of Care Reviewed With patient   Patient Care Overview   Progress (re-eval)   Outcome Evaluation   Outcome Summary/Follow up Plan Clinical dysphagia re-eval complete. Pt lethargic w/ difficulty following directions. Pt currently on high flow nasal canula. RN reports pt coughing w/ nectar thick liquids during breakfast. Oral motor and structure seemingly WFL.Trialed ice chip X1 and nectar thick. Overt clinical s/s asp w/ nectar c/b immediate coughing likely 2' lethargy and cognition.   REC: NPO. Meds via alt route. SLP will f/u when pt more alert.          Problem: Inpatient SLP  Goal: Dysphagia- Patient will safely consume diet as per recommendation with no signs/symptoms of aspiration  Outcome: Ongoing (interventions implemented as appropriate)    10/13/17 1045 10/16/17 1509   Safely Consume Diet   Safely Consume Diet- SLP, Date Established 10/13/17 --    Safely Consume Diet- SLP, Time to Achieve by discharge --    Safely Consume Diet- SLP, Date Goal Reviewed --  10/16/17

## 2017-10-16 NOTE — THERAPY EVALUATION
Acute Care - Speech Language Pathology   Swallow Re-Evaluation [facility]   Clinical Swallow Evaluation     Patient Name: Tod Ambrose  : 1950  MRN: [id number]  Today's Date: 10/16/2017  Onset of Illness/Injury or Date of Surgery Date: 10/11/17  Date of Referral to SLP: 10/12/17         Admit Date: 10/8/2017    Visit Dx:     ICD-10-CM ICD-9-CM   1. Sepsis, due to unspecified organism A41.9 038.9     995.91   2. Lactic acidosis E87.2 276.2   3. Impaired functional mobility, balance, gait, and endurance Z74.09 V49.89   4. Dysphagia, unspecified type R13.10 787.20     Patient Active Problem List   Diagnosis   • Sepsis   • Altered mental status   • Diastolic dysfunction   • Hypoglycemia   • H/O Alcoholic hepatitis with ascites   • Elevated lactic acid level   • Hyponatremia   • Hepatic encephalopathy   • Alcohol abuse   • Mixed respiratory failure     Past Medical History:   Diagnosis Date   • Alcoholic hepatitis with ascites    • COPD (chronic obstructive pulmonary disease)    • Diastolic dysfunction    • Polycythemia    • Thrombocytopenia      Past Surgical History:   Procedure Laterality Date   • PARACENTESIS            SWALLOW EVALUATION (last 72 hours)      Swallow Evaluation       10/16/17 1400                Rehab Evaluation    Document Type (P)  re-evaluation  -AS        Subjective Information (P)  no complaints;agree to therapy  -AS        General Information    Patient Profile Review (P)  yes  -AS        Onset of Illness/Injury (P)  10/08/17  -AS        Subjective Patient Observations (P)  Pt lethargic w/ difficulty following commands and keeping eyes open.   -AS        Pertinent History Of Current Problem (P)  Admit w/ altered mental status, mixed respiratiory failure. Currently on high flow nasal canula. Alcohol w/d.  -AS        Current Diet Limitations (P)  nectar thick liquids;mechanical soft  -AS        Precautions/Limitations, Vision (P)  WFL;other (see comments)   for purposes of eval   -AS         Precautions/Limitations, Hearing (P)  WFL;other (see comments)   for purposes of eval   -AS        Prior Level of Function- Communication (P)  other (comment)   unable to assess 2' pt lethargy   -AS        Prior Level of Function- Swallowing (P)  no diet consistency restrictions  -AS        Plans/Goals Discussed With (P)  patient;agreed upon  -AS        Barriers to Rehab (P)  cognitive status  -AS        Clinical Impression    Patient's Goals For Discharge (P)  patient did not state  -AS        Rehab Potential/Prognosis, Swallowing (P)  good, to achieve stated therapy goals  -AS        Criteria for Skilled Therapeutic Interventions Met (P)  skilled criteria for dysphagia intervention met  -AS        Therapy Frequency (P)  5 times/wk  -AS        Predicted Duration Therapy Interv (days) (P)  until discharge  -AS        SLP Diet Recommendation (P)  NPO: unsafe for food/liquid intake  -AS        Recommended Diagnostics (P)  reassess via clinical swallow (non-instrumental exam)  -AS        SLP Rec. for Method of Medication Administration (P)  meds via alt route  -AS        Monitor For Signs Of Aspiration (P)  cough  -AS        Pain Assessment    Pain Assessment (P)  Galeana-Liriano FACES  -AS        Galeana-Baker FACES Pain Rating (P)  0  -AS        Pain Score (P)  0  -AS        Post Pain Score (P)  0  -AS        Cognitive Assessment/Intervention    Current Cognitive/Communication Assessment (P)  impaired  -AS        Oral Motor Structure and Function    Oral Motor Anatomy and Physiology (P)  patient demonstrates anatomy and physiology that is WNL  -AS        Dentition Assessment (P)  present and adequate  -AS        Secretion Management (P)  WNL/WFL  -AS        Mucosal Quality (P)  dry  -AS        Volitional Swallow (P)  no difficulties initiating volitional swallow  -AS        Volitional Cough (P)  no difficulties initiating volitional cough  -AS        Oral Musculature General Assessment (P)  WFL (within functional  limits)  -AS        General Feeding/Swallowing Observations    Current Feeding Method (P)  oral feeding methods  -AS        Respiratory Support Currently in Use (P)  nasal cannula in use;other (see comments)   high flow nasal canula  -AS        Clinical Swallow Exam    Mode of Presentation (P)  fed by clinician;cup;spoon  -AS        Oral Phase Results (P)  other (see comments)   unable to assess 2' limited trials  -AS        Pharyngeal Phase Results (P)  sign/symptoms of pharyngeal impairment;cough  -AS        Summary of Clinical Exam (P)  Clinical dysphagia re-eval complete. Pt lethargic w/ difficulty following directions. Pt currently on high flow nasal canula. RN reports pt coughing w/ nectar thick liquids during breakfast. Oral motor and structure seemingly WFL.Trialed ice chip X1 and nectar thick. Overt clinical s/s asp w/ nectar c/b immediate coughing likely 2' lethargy and cognition. REC: NPO. Meds via alt route. SLP will f/u when pt more alert.   -AS          User Key  (r) = Recorded By, (t) = Taken By, (c) = Cosigned By    Initials Name Effective Dates    AS Estrella Houser, Speech Therapy Student 08/24/17 -         EDUCATION  The patient has been educated in the following areas:   Dysphagia (Swallowing Impairment) Oral Care/Hydration NPO rationale.    SLP Recommendation and Plan     SLP Diet Recommendation: (P) NPO: unsafe for food/liquid intake     SLP Rec. for Method of Medication Administration: (P) meds crushed in pudding/applesauce  Monitor For Signs Of Aspiration: (P) cough  Recommended Diagnostics: (P) reassess via clinical swallow (non-instrumental exam)  Criteria for Skilled Therapeutic Interventions Met: (P) skilled criteria for dysphagia intervention met     Rehab Potential/Prognosis, Swallowing: (P) good, to achieve stated therapy goals  Therapy Frequency: (P) 5 times/wk    Plan of Care Review  Plan Of Care Reviewed With: (P) patient  Progress: (P)  (re-eval)  Outcome Summary/Follow up Plan: (P)  Clinical dysphagia re-eval complete. Pt lethargic w/ difficulty following directions. Pt currently on high flow nasal canula. RN reports pt coughing w/ nectar thick liquids during breakfast. Oral motor and structure seemingly WFL.Trialed ice chip X1 and nectar thick. Overt clinical s/s asp w/ nectar c/b immediate coughing likely 2' lethargy and cognition. REC: NPO. Meds via alt route. SLP will f/u when pt more alert.           IP SLP Goals       10/16/17 1509 10/13/17 1045 10/12/17 1552    Safely Consume Diet    Safely Consume Diet- SLP, Date Established  10/13/17  -SM     Safely Consume Diet- SLP, Time to Achieve  by discharge  -SM     Safely Consume Diet- SLP, Date Goal Reviewed (P)  10/16/17  -AS      Safely Consume Diet- SLP, Outcome  goal ongoing  -SM     Begin to Take Some PO Safely    Begin to Take Some PO Safely- SLP, Date Established   10/12/17  -AC (r) AS (t) AC (c)    Begin to Take Some PO Safely- SLP, Time to Achieve   by discharge  -AC (r) AS (t) AC (c)    Begin to Take Some PO Safely- SLP, Date Goal Reviewed   10/12/17  -AC (r) AS (t) AC (c)    Begin to Take Some PO Safely- SLP, Outcome  goal met  -SM       User Key  (r) = Recorded By, (t) = Taken By, (c) = Cosigned By    Initials Name Provider Type     Melany Alas, MS CCC-SLP Speech and Language Pathologist    BRUNO Starr, MS CCC-SLP Speech and Language Pathologist    AS Estrella Houser, Speech Therapy Student Speech Therapy Student          Time Calculation:         Time Calculation- SLP       10/16/17 1510          Time Calculation- SLP    SLP Start Time (P)  1400  -AS      SLP Received On (P)  10/16/17  -AS        User Key  (r) = Recorded By, (t) = Taken By, (c) = Cosigned By    Initials Name Provider Type    AS Estrella Houser Speech Therapy Student Speech Therapy Student          Therapy Charges for Today     Code Description Service Date Service Provider Modifiers Qty    [id number] HC ST EVAL ORAL PHARYNG SWALLOW 3 10/16/2017 Estrella  Corrina, Speech Therapy Student GN 1               Estrella Houser, Speech Therapy Student  10/16/2017

## 2017-10-16 NOTE — THERAPY TREATMENT NOTE
Acute Care - Physical Therapy Treatment Note  [facility]     Patient Name: Tod Ambrose  : 1950  MRN: [id number]  Today's Date: 10/16/2017  Onset of Illness/Injury or Date of Surgery Date: 10/11/17  Date of Referral to PT: 10/11/17  Referring Physician: MD Nicholas    Admit Date: 10/8/2017    Visit Dx:    ICD-10-CM ICD-9-CM   1. Sepsis, due to unspecified organism A41.9 038.9     995.91   2. Lactic acidosis E87.2 276.2   3. Impaired functional mobility, balance, gait, and endurance Z74.09 V49.89   4. Dysphagia, unspecified type R13.10 787.20     Patient Active Problem List   Diagnosis   • Sepsis   • Altered mental status   • Diastolic dysfunction   • Hypoglycemia   • H/O Alcoholic hepatitis with ascites   • Elevated lactic acid level   • Hyponatremia   • Hepatic encephalopathy   • Alcohol abuse   • Mixed respiratory failure               Adult Rehabilitation Note       10/16/17 0819          Rehab Assessment/Intervention    Discipline physical therapist  -SJ      Subjective Information agree to therapy;complains of;pain  -SJ      Patient Effort, Rehab Treatment fair  -SJ      Symptoms Noted During/After Treatment none  -SJ      Precautions/Limitations fall precautions;oxygen therapy device and L/min;swallowing precautions;other (see comments)   large abdominal hernia  -SJ      Recorded by [SJ] Praveena Cross PT      Vital Signs    Pretreatment Heart Rate (beats/min) 117  -SJ      Pre SpO2 (%) 93  -SJ      O2 Delivery Pre Treatment supplemental O2   vitals stable throughout session  -SJ      Recorded by [SJ] Praveena Cross PT      Pain Assessment    Pain Assessment [place]Liriano FACES  -SJ      Pain Score 2  -SJ      Post Pain Score 2  -SJ      Pain Type Chronic pain  -SJ      Pain Location Generalized  -SJ      Pain Intervention(s) Repositioned  -SJ      Recorded by [SJ] Praveena Cross PT      Cognitive Assessment/Intervention    Current Cognitive/Communication Assessment impaired  -SJ       Orientation Status oriented to;person  -SJ      Follows Commands/Answers Questions 50% of the time;able to follow single-step instructions;needs cueing;needs increased time;needs repetition  -SJ      Personal Safety moderate impairment;unaware of cognitive deficits;unaware of consequences of deficits;unaware of functional deficits  -SJ      Personal Safety Interventions fall prevention program maintained;gait belt;muscle strengthening facilitated;nonskid shoes/slippers when out of bed  -SJ      Recorded by [SJ] Praveena Cross PT      Bed Mobility, Assessment/Treatment    Bed Mobility, Assistive Device bed rails;head of bed elevated;draw sheet  -SJ      Bed Mob, Supine to Sit, [place] maximum assist (25% patient effort);2 person assist required;verbal cues required  -SJ      Bed Mobility, Safety Issues cognitive deficits limit understanding;decreased use of arms for pushing/pulling;decreased use of legs for bridging/pushing  -SJ      Bed Mobility, Impairments strength decreased;impaired balance  -SJ      Bed Mobility, Comment max cues for sequencing  -SJ      Recorded by [SJ] Praveena Cross PT      Transfer Assessment/Treatment    Transfers, Bed-Chair [place] maximum assist (25% patient effort);2 person assist required;verbal cues required  -SJ      Transfers, Sit-Stand [place] maximum assist (25% patient effort);2 person assist required;verbal cues required  -SJ      Transfers, Stand-Sit [place] moderate assist (50% patient effort);2 person assist required;verbal cues required  -SJ      Transfer, Safety Issues step length decreased;weight-shifting ability decreased;balance decreased during turns  -SJ      Transfer, Impairments strength decreased;impaired balance;coordination impaired  -SJ      Transfer, Comment decreased processing today, required max cues for sequencing  -SJ      Recorded by [SJ] Praveena Cross PT      Gait Assessment/Treatment    Gait, [place] Level not tested  -       Recorded by [SJ] Praveena Cross, PT      Balance Skills Training    Sitting-Level of Assistance Contact guard  -SJ      Sitting-Balance Support Feet supported;Right upper extremity supported;Left upper extremity supported  -SJ      Standing-Level of Assistance Maximum assistance;x2  -SJ      Static Standing Balance Support assistive device  -SJ      Recorded by [SJ] Praveena Cross, PT      Positioning and Restraints    Pre-Treatment Position in bed  -SJ      Post Treatment Position chair  -SJ      In Chair reclined;call light within reach;encouraged to call for assist;exit alarm on;waffle cushion;with nsg;heels elevated  -SJ      Recorded by [SJ] Praveena Cross, PT        User Key  (r) = Recorded By, (t) = Taken By, (c) = Cosigned By    Initials Name Effective Dates    LYNN Cross, PT 06/19/15 -                 IP PT Goals       10/13/17 1033 10/12/17 1408       Bed Mobility PT LTG    Bed Mobility PT LTG, Date Established  10/12/17  -LS     Bed Mobility PT LTG, Time to Achieve  2 wks  -LS     Bed Mobility PT LTG, Activity Type  supine to sit/sit to supine  -LS     Bed Mobility PT LTG, [place] Level  minimum assist (75% patient effort)  -LS     Bed Mobility PT LTG, Outcome goal ongoing  -KR      Transfer Training PT LTG    Transfer Training PT LTG, Date Established  10/12/17  -LS     Transfer Training PT LTG, Time to Achieve  2 wks  -LS     Transfer Training PT LTG, Activity Type  sit to stand/stand to sit  -LS     Transfer Training PT LTG, [place] Level  minimum assist (75% patient effort)  -LS     Transfer Training PT LTG, Assist Device  walker, rolling  -LS     Transfer Training PT LTG, Outcome goal ongoing  -KR      Gait Training PT LTG    Gait Training Goal PT LTG, Date Established  10/12/17  -LS     Gait Training Goal PT LTG, Time to Achieve  2 wks  -LS     Gait Training Goal PT LTG, [place] Level  minimum assist (75% patient effort);2 person assist required  -LS     Gait Training  Goal PT LTG, Assist Device  walker, rolling  -LS     Gait Training Goal PT LTG, Distance to Achieve  50  -LS     Gait Training Goal PT LTG, Outcome goal ongoing  -KR        User Key  (r) = Recorded By, (t) = Taken By, (c) = Cosigned By    Initials Name Provider Type    LS Alyce Pearce, PT Physical Therapist    JEANINE Marquez, PT Physical Therapist          Physical Therapy Education     Title: PT OT SLP Therapies (Active)     Topic: Physical Therapy (Active)     Point: Mobility training (Active)    Learning Progress Summary    Learner Readiness Method Response Comment Documented by Status   Patient Acceptance E,D NR,NL  SJ 10/16/17 0842 Active    Acceptance E VU  SC 10/16/17 0405 Done    Acceptance E NR  AR 10/13/17 1649 Active    Acceptance E NR  KR 10/13/17 1033 Active    Acceptance E,D NR  LS 10/12/17 1408 Active               Point: Home exercise program (Active)    Learning Progress Summary    Learner Readiness Method Response Comment Documented by Status   Patient Acceptance E,D NR,NL  SJ 10/16/17 0842 Active    Acceptance E VU  SC 10/16/17 0405 Done    Acceptance E NR  AR 10/13/17 1649 Active               Point: Body mechanics (Active)    Learning Progress Summary    Learner Readiness Method Response Comment Documented by Status   Patient Acceptance E,D NR,NL  SJ 10/16/17 0842 Active    Acceptance E VU  SC 10/16/17 0405 Done    Acceptance E NR  AR 10/13/17 1649 Active    Acceptance E NR  KR 10/13/17 1033 Active    Acceptance E,D NR  LS 10/12/17 1408 Active               Point: Precautions (Active)    Learning Progress Summary    Learner Readiness Method Response Comment Documented by Status   Patient Acceptance E,D NR,NL  SJ 10/16/17 0842 Active    Acceptance E VU  SC 10/16/17 0405 Done    Acceptance E NR  AR 10/13/17 1649 Active    Acceptance E NR  KR 10/13/17 1033 Active    Acceptance E,D NR  LS 10/12/17 1408 Active                      User Key     Initials Effective Dates Name Provider Type  Discipline     06/19/15 -  Praveena Cross, PT Physical Therapist PT    LS 06/19/15 -  Alyce Pearce, PT Physical Therapist PT    SC 06/16/16 -  Mirela Biswas, RN Registered Nurse Nurse    AR 06/16/16 -  Renetta Seymour, RN Registered Nurse Nurse    KR 09/25/17 -  Sarah Marquez, PT Physical Therapist PT                    PT Recommendation and Plan  Anticipated Discharge Disposition: home with assist  PT Frequency: daily  Plan of Care Review  Plan Of Care Reviewed With: patient  Progress: progress toward functional goals is gradual  Outcome Summary/Follow up Plan: Pt had increased difficulty today with bed mobility and t/f's; decreased processing for command following. Pt t/f bed --> recliner with maxA x2. PT recommends d/c to SNF.          Outcome Measures       10/16/17 0819          How much help from another person do you currently need...    Turning from your back to your side while in flat bed without using bedrails? 2  -SJ      Moving from lying on back to sitting on the side of a flat bed without bedrails? 2  -SJ      Moving to and from a bed to a chair (including a wheelchair)? 2  -SJ      Standing up from a chair using your arms (e.g., wheelchair, bedside chair)? 2  -SJ      Climbing 3-5 steps with a railing? 1  -SJ      To walk in hospital room? 1  -SJ      AM-PAC 6 Clicks Score 10  -      Functional Assessment    Outcome Measure Options AM-PAC 6 Clicks Basic Mobility (PT)  -        User Key  (r) = Recorded By, (t) = Taken By, (c) = Cosigned By    Initials Name Provider Type     Praveena Cross, PT Physical Therapist           Time Calculation:         PT Charges       10/16/17 0842          Time Calculation    Start Time 0819  -      PT Received On 10/16/17  -      PT Goal Re-Cert Due Date 10/22/17  -      Time Calculation- PT    Total Timed Code Minutes- PT 15 minute(s)  -        User Key  (r) = Recorded By, (t) = Taken By, (c) = Cosigned By    Initials Name Provider Type    LYNN Grissom  LAURYN Cross Physical Therapist          Therapy Charges for Today     Code Description Service Date Service Provider Modifiers Qty    61476427368 HC PT THER PROC EA 15 MIN 10/16/2017 Praveena Cross PT GP 1          PT G-Codes  Outcome Measure Options: AM-PAC 6 Clicks Basic Mobility (PT)    Praveena Cross, PT  10/16/2017

## 2017-10-16 NOTE — PLAN OF CARE
Problem: Patient Care Overview (Adult)  Goal: Plan of Care Review  Outcome: Ongoing (interventions implemented as appropriate)    10/16/17 0404   Outcome Evaluation   Outcome Summary/Follow up Plan pt asleep most of shift hard to arouse, breath sounds coarse with gurgling while asleep. woke one time pulling at lines and aggressive behavior, medicated, resting now        Goal: Adult Individualization and Mutuality  Outcome: Ongoing (interventions implemented as appropriate)  Goal: Discharge Needs Assessment  Outcome: Ongoing (interventions implemented as appropriate)    Problem: Sepsis (Adult)  Goal: Signs and Symptoms of Listed Potential Problems Will be Absent or Manageable (Sepsis)  Outcome: Ongoing (interventions implemented as appropriate)    Problem: Respiratory Insufficiency (Adult)  Goal: Identify Related Risk Factors and Signs and Symptoms  Outcome: Ongoing (interventions implemented as appropriate)  Goal: Acid/Base Balance  Outcome: Ongoing (interventions implemented as appropriate)  Goal: Effective Ventilation  Outcome: Ongoing (interventions implemented as appropriate)    Problem: Wound, Traumatic, Nonburn (Adult)  Goal: Signs and Symptoms of Listed Potential Problems Will be Absent or Manageable (Wound, Traumatic, Nonburn)  Outcome: Ongoing (interventions implemented as appropriate)

## 2017-10-16 NOTE — PROGRESS NOTES
"      HOSPITALIST DAILY PROGRESS NOTE    Chief Complaint: FU AMS    Subjective   SUBJECTIVE/OVERNIGHT EVENTS   No acute events ON. Pt appears to be more awake. Falls back asleep but wakes up to voice and answers questions though inappropriately at times.     Review of Systems:  General - No fevers, no chills  CVS - No chest pain, no palpitations  Resp - No cough, no dyspnea on Hi-flow  GI - No N/V/D, no abd pain     Objective   OBJECTIVE   I have reviewed the vital signs.  /66 (BP Location: Left arm, Patient Position: Lying)  Pulse 110 Comment: nurse advised  Temp 98.1 °F (36.7 °C) (Axillary)   Resp 20 Comment: nurse advised  Ht 64\" (162.6 cm)  Wt (!) 331 lb (150 kg)  SpO2 90%  BMI 56.82 kg/m2    Physical Exam:  General - NAD, pt was laying in bed on Hi-Flow comfortably  HEENT - EOMI, PERRL, oropharynx clear, hearing intact  CVS - RRR, S1 S2, no rubs, gallops or murmurs, 2s cap refill, no peripheral edema, 2+ pulses  Resp - CTAB, no crackles and wheezes   GI - +BS, soft, ND, grimaces some with palpation of abdomen diffusely  MSK - No joint pain or joint edema  Neuro - Awake. Not oriented. Drowsy but wakes up easily to voice    Results:  I have reviewed the labs, culture data, radiology results, and diagnostic studies.      Results from last 7 days  Lab Units 10/15/17  0532 10/13/17  0446 10/12/17  0409   WBC 10*3/mm3 13.03* 10.00 9.76   HEMOGLOBIN g/dL 13.8 13.0* 12.9*   HEMATOCRIT % 41.0 37.9* 37.3*   PLATELETS 10*3/mm3 87* 89* 94*       Results from last 7 days  Lab Units 10/15/17  0532   SODIUM mmol/L 145   POTASSIUM mmol/L 4.1   CHLORIDE mmol/L 103   CO2 mmol/L 29.0   BUN mg/dL 13   CREATININE mg/dL 0.60   GLUCOSE mg/dL 109*   CALCIUM mg/dL 8.2*       Culture Data:  Cultures:    Blood Culture   Date Value Ref Range Status   10/08/2017 No growth at 5 days  Final   10/08/2017 No growth at 5 days  Final     Urine Culture   Date Value Ref Range Status   10/08/2017 No growth at 2 days  Final   10/08/2017 " 70,000-80,000 CFU/mL Enterococcus faecalis (A)  Final       I have reviewed the medications.    ampicillin 1 g Intravenous Q6H   bacitracin  Topical Q12H   famotidine 20 mg Oral BID   folic acid 1 mg Oral Daily   lactulose 20 g Oral TID   multivitamin and minerals 15 mL Oral Daily   nystatin  Topical Q12H   rifAXIMin 550 mg Oral Q12H   thiamine 100 mg Oral Daily       Assessment/Plan   ASSESSMENT/PLAN    Principal Problem:    Altered mental status  Active Problems:    Sepsis    Diastolic dysfunction    Hypoglycemia    H/O Alcoholic hepatitis with ascites    Elevated lactic acid level    Hyponatremia    Hepatic encephalopathy    Alcohol abuse    Mixed respiratory failure    # Acute hypoxemic and hypercapnic respiratory failure: Currently on high flow  # Chronic hypernatremia: Will start D5W. Goal Na 145 later today. Repeat lytes q6hrs. Adjust D5W to decrease Na <10mEq in 24hrs  # Acute liver failure: INR>1.5, HE, transaminitis with no cirrhosis. Too far out to benefit from NAC  # HE: Ammonia 141 on admission. Continue rifaximin and lactulose PO and enemas to titrate to 2-3 BMs daily  # E faecalis UTI: Continue ampicillin   # Alcohol withdrawal: Daily folate and thiamine. CIWA protocol  # Alcoholic hepatitis: Fatty liver on CT    - Pt more awake today. Continue CIWA ativan protocol. Continue lactulose PO/enema  - D5W as above for hypernatremia    DVT PPx: Hold for thrombocytopenia  I expect patient to be discharged in:  ??    Karolina Garcia MD  10/16/17  6:30 AM

## 2017-10-16 NOTE — PLAN OF CARE
Problem: Patient Care Overview (Adult)  Goal: Plan of Care Review  Outcome: Ongoing (interventions implemented as appropriate)    10/16/17 0840   Coping/Psychosocial Response Interventions   Plan Of Care Reviewed With patient   Patient Care Overview   Progress progress toward functional goals is gradual   Outcome Evaluation   Outcome Summary/Follow up Plan Pt had increased difficulty today with bed mobility and t/f's; decreased processing for command following. Pt t/f bed --> recliner with maxA x2. PT recommends d/c to SNF.         Problem: Inpatient Physical Therapy  Goal: Bed Mobility Goal LTG- PT  Outcome: Ongoing (interventions implemented as appropriate)    10/12/17 1408 10/13/17 1033   Bed Mobility PT LTG   Bed Mobility PT LTG, Date Established 10/12/17 --    Bed Mobility PT LTG, Time to Achieve 2 wks --    Bed Mobility PT LTG, Activity Type supine to sit/sit to supine --    Bed Mobility PT LTG, [place] Level minimum assist (75% patient effort) --    Bed Mobility PT LTG, Outcome --  goal ongoing       Goal: Transfer Training Goal 1 LTG- PT  Outcome: Ongoing (interventions implemented as appropriate)    10/12/17 1408 10/13/17 1033   Transfer Training PT LTG   Transfer Training PT LTG, Date Established 10/12/17 --    Transfer Training PT LTG, Time to Achieve 2 wks --    Transfer Training PT LTG, Activity Type sit to stand/stand to sit --    Transfer Training PT LTG, [place] Level minimum assist (75% patient effort) --    Transfer Training PT LTG, Assist Device walker, rolling --    Transfer Training PT LTG, Outcome --  goal ongoing       Goal: Gait Training Goal LTG- PT  Outcome: Ongoing (interventions implemented as appropriate)    10/12/17 1408 10/13/17 1033   Gait Training PT LTG   Gait Training Goal PT LTG, Date Established 10/12/17 --    Gait Training Goal PT LTG, Time to Achieve 2 wks --    Gait Training Goal PT LTG, [place] Level minimum assist (75% patient effort);2 person assist required  --    Gait Training Goal PT LTG, Assist Device walker, rolling --    Gait Training Goal PT LTG, Distance to Achieve 50 --    Gait Training Goal PT LTG, Outcome --  goal ongoing

## 2017-10-16 NOTE — PROGRESS NOTES
Continued Stay Note  [facility]     Patient Name: Tod Ambrose  MRN: [id number]  Today's Date: 10/16/2017    Admit Date: 10/8/2017          Discharge Plan       10/16/17 1343    Case Management/Social Work Plan    Plan snf    Additional Comments Will follow up with the referral to [facility].               Discharge Codes     None        Expected Discharge Date and Time     Expected Discharge Date Expected Discharge Time    Oct 20, 2017             Lily Robleor RN

## 2017-10-17 NOTE — THERAPY EVALUATION
Acute Care - Speech Language Pathology   Swallow Initial Evaluation [facility]   Clinical Swallow Evaluation       Patient Name: Tod Ambrose  : 1950  MRN: [id number]  Today's Date: 10/17/2017  Onset of Illness/Injury or Date of Surgery Date: 10/11/17  Date of Referral to SLP: 10/12/17         Admit Date: 10/8/2017    Visit Dx:     ICD-10-CM ICD-9-CM   1. Dysphagia, unspecified type R13.10 787.20   2. Sepsis, due to unspecified organism A41.9 038.9     995.91   3. Lactic acidosis E87.2 276.2   4. Impaired functional mobility, balance, gait, and endurance Z74.09 V49.89     Patient Active Problem List   Diagnosis   • Sepsis   • Altered mental status   • Diastolic dysfunction   • Hypoglycemia   • H/O Alcoholic hepatitis with ascites   • Elevated lactic acid level   • Hyponatremia   • Hepatic encephalopathy   • Alcohol abuse   • Mixed respiratory failure     Past Medical History:   Diagnosis Date   • Alcoholic hepatitis with ascites    • COPD (chronic obstructive pulmonary disease)    • Diastolic dysfunction    • Polycythemia    • Thrombocytopenia      Past Surgical History:   Procedure Laterality Date   • PARACENTESIS            SWALLOW EVALUATION (last 72 hours)      Swallow Evaluation       10/17/17 0820 10/16/17 1400             Rehab Evaluation    Document Type evaluation  -SG re-evaluation  -LS (r) AS (t) LS (c)       Subjective Information no complaints;agree to therapy  -SG no complaints;agree to therapy  -LS (r) AS (t) LS (c)       Patient Effort, Rehab Treatment adequate  -SG        Symptoms Noted During/After Treatment fatigue;shortness of breath;significant change in vital signs   dropped to 79 during eval, husam back to 86 at end of eval  -SG        General Information    Patient Profile Review yes  -SG yes  -LS (r) AS (t) LS (c)       Onset of Illness/Injury 10/08/17  -SG 10/08/17  -LS (r) AS (t) LS (c)       Subjective Patient Observations pt very lethargic w/ inc'd secretions this date  -SG Pt  lethargic w/ difficulty following commands and keeping eyes open.   -LS (r) AS (t) LS (c)       Pertinent History Of Current Problem adm w/ AMS, see initial eval for full hx  -SG Admit w/ altered mental status, mixed respiratiory failure. Currently on high flow nasal canula. Alcohol w/d.  -LS (r) AS (t) LS (c)       Current Diet Limitations NPO  -SG nectar thick liquids;mechanical soft  -LS (r) AS (t) LS (c)       Precautions/Limitations, Vision WFL  -SG WFL;other (see comments)   for purposes of eval   -LS (r) AS (t) LS (c)       Precautions/Limitations, Hearing WFL  -SG WFL;other (see comments)   for purposes of eval   -LS (r) AS (t) LS (c)       Prior Level of Function- Communication functional in all spheres  -SG other (comment)   unable to assess 2' pt lethargy   -LS (r) AS (t) LS (c)       Prior Level of Function- Swallowing no diet consistency restrictions  -SG no diet consistency restrictions  -LS (r) AS (t) LS (c)       Plans/Goals Discussed With patient;agreed upon  -SG patient;agreed upon  -LS (r) AS (t) LS (c)       Barriers to Rehab cognitive status  -SG cognitive status  -LS (r) AS (t) LS (c)       Clinical Impression    Patient's Goals For Discharge patient could not state  -SG patient did not state  -LS (r) AS (t) LS (c)       SLP Swallowing Diagnosis other (see comments);pharyngeal dysfunction   s/s of pharyngeal dys  -SG        Rehab Potential/Prognosis, Swallowing good, to achieve stated therapy goals  -SG good, to achieve stated therapy goals  -LS (r) AS (t) LS (c)       Criteria for Skilled Therapeutic Interventions Met skilled criteria for dysphagia intervention met  -SG skilled criteria for dysphagia intervention met  -LS (r) AS (t) LS (c)       FCM, Swallowing 1-->Level 1  -SG        Therapy Frequency 5 times/wk  -SG 5 times/wk  -LS (r) AS (t) LS (c)       Predicted Duration Therapy Interv (days) until discharge  -SG until discharge  -LS (r) AS (t) LS (c)       SLP Diet Recommendation NPO:  unsafe for food/liquid intake  -SG NPO: unsafe for food/liquid intake  -LS (r) AS (t) LS (c)       Recommended Diagnostics reassess via clinical swallow (non-instrumental exam)   dys tx, will check for instrumental readiness daily  -SG reassess via clinical swallow (non-instrumental exam)  -LS (r) AS (t) LS (c)       SLP Rec. for Method of Medication Administration meds via alternate route  -SG meds crushed in pudding/applesauce  -LS (r) AS (t) LS (c)       Monitor For Signs Of Aspiration cough;throat clearing  -SG cough  -LS (r) AS (t) LS (c)       Anticipated Discharge Disposition placement for care  -SG        Pain Assessment    Pain Assessment Galeana-Liriano FACES  -SG Galeana-Baker FACES  -LS (r) AS (t) LS (c)       Galeana-Baker FACES Pain Rating 0  -SG 0  -LS (r) AS (t) LS (c)       Pain Score 0  -SG 0  -LS (r) AS (t) LS (c)       Post Pain Score  0  -LS (r) AS (t) LS (c)       Cognitive Assessment/Intervention    Current Cognitive/Communication Assessment  impaired  -LS (r) AS (t) LS (c)       Oral Motor Structure and Function    Oral Motor Anatomy and Physiology patient demonstrates anatomy and physiology that is WNL  -SG patient demonstrates anatomy and physiology that is WNL  -LS (r) AS (t) LS (c)       Dentition Assessment present and adequate  -SG present and adequate  -LS (r) AS (t) LS (c)       Secretion Management requires suctioning to control secretions  -SG WNL/WFL  -LS (r) AS (t) LS (c)       Mucosal Quality dry;other (see comments)   bloody secretions in pt's mouth.  -SG dry  -LS (r) AS (t) LS (c)       Volitional Swallow mild to moderate difficulties initiating volitional swallow  -SG no difficulties initiating volitional swallow  -LS (r) AS (t) LS (c)       Volitional Cough weak volitional cough  -SG no difficulties initiating volitional cough  -LS (r) AS (t) LS (c)       Oral Musculature General Assessment WFL (within functional limits)  -SG WFL (within functional limits)  -LS (r) AS (t) LS (c)        General Feeding/Swallowing Observations    Current Feeding Method NPO;nasogastric feedings  -SG oral feeding methods  -LS (r) AS (t) LS (c)       Respiratory Support Currently in Use nasal cannula in use   HFNC  -SG nasal cannula in use;other (see comments)   high flow nasal canula  -LS (r) AS (t) LS (c)       Clinical Swallow Exam    Mode of Presentation fed by clinician  -SG fed by clinician;cup;spoon  -LS (r) AS (t) LS (c)       Respiratory Concerns increased respiratory rate during eating  -SG        Oral Phase Results impaired oral phase, signs of dysfunction present  -SG other (see comments)   unable to assess 2' limited trials  -LS (r) AS (t) LS (c)       Pharyngeal Phase Results sign/symptoms of pharyngeal impairment;cough  -SG sign/symptoms of pharyngeal impairment;cough  -LS (r) AS (t) LS (c)       Summary of Clinical Exam Clinical Dys Re-eval completed this date. Pt very lethargic with bloody secretions throughout oral cavity which SLP removed during oral care. Pt on HFNC for eval. Sats dropped to 79 during eval, but returned to 86 by end of eval. Immediate s/s w/ thins and NT even with small tsp trials. Pt is not ready for instrumental eval. Rec: Initiate dys tx and check for instrumental eval readiness daily.  -SG Clinical dysphagia re-eval complete. Pt lethargic w/ difficulty following directions. Pt currently on high flow nasal canula. RN reports pt coughing w/ nectar thick liquids during breakfast. Oral motor and structure seemingly WFL.Trialed ice chip X1 and nectar thick. Overt clinical s/s asp w/ nectar c/b immediate coughing likely 2' lethargy and cognition. REC: NPO. Meds crushed w/ applesauce. SLP will f/u when pt more alert.   -LS (r) AS (t) LS (c)       Swallow Recommendations    Oral Care oral care with toothbrush and dentifrice BID and PRN  -SG        Other Recommendations instrumental exam   when clinically appropraite  -SG        Recommended Diet NPO: unsafe for food/liquid intake  -SG         Dysphagia Other 1    Dysphagia Other 1 Objective Patient will demonstrate instumental readiness by not showing s/s of asp with thickened PO trials.  -SG        Status: Dysphagia Other 1 Revised  -SG        Dysphagia Other 1 Progress 80%;without cues;continue to address  -SG          User Key  (r) = Recorded By, (t) = Taken By, (c) = Cosigned By    Initials Name Effective Dates    SG Lily Owens-Arya, MS [facility]-SLP 06/22/15 -     ASIA Oviedo, MS [facility]-SLP 06/22/15 -     SONIDO Houser, Speech Therapy Student 08/24/17 -         EDUCATION  The patient has been educated in the following areas:   Dysphagia (Swallowing Impairment) Oral Care/Hydration NPO rationale.    SLP Recommendation and Plan  SLP Swallowing Diagnosis: other (see comments), pharyngeal dysfunction (s/s of pharyngeal dys)  SLP Diet Recommendation: NPO: unsafe for food/liquid intake     SLP Rec. for Method of Medication Administration: meds via alternate route  Monitor For Signs Of Aspiration: cough, throat clearing  Recommended Diagnostics: reassess via clinical swallow (non-instrumental exam) (dys tx, will check for instrumental readiness daily)  Criteria for Skilled Therapeutic Interventions Met: skilled criteria for dysphagia intervention met  Anticipated Discharge Disposition: placement for care  Rehab Potential/Prognosis, Swallowing: good, to achieve stated therapy goals  Therapy Frequency: 5 times/wk             Plan of Care Review  Plan Of Care Reviewed With: patient  Progress:  (Evaluation)  Outcome Summary/Follow up Plan: Clinical Dys Re-eval completed this date. Pt very lethargic with bloody secretions throughout oral cavity which SLP removed during oral care. Pt on HFNC for eval. Sats dropped to 79 during eval, but returned to 86 by end of eval. Immediate s/s w/ thins and NT even with small tsp trials. Pt is not ready for instrumental eval. Rec: Initiate dys tx and check for instrumental eval readiness daily.          IP SLP  Goals       10/17/17 0859 10/16/17 1509 10/13/17 1045    Safely Consume Diet    Safely Consume Diet- SLP, Date Established   10/13/17  -SM    Safely Consume Diet- SLP, Time to Achieve   by discharge  -SM    Safely Consume Diet- SLP, Date Goal Reviewed 10/17/17  -SG 10/16/17  -LS (r) AS (t) LS (c)     Safely Consume Diet- SLP, Outcome goal ongoing  -SG  goal ongoing  -SM    Begin to Take Some PO Safely    Begin to Take Some PO Safely- SLP, Outcome   goal met  -SM      10/12/17 1552          Begin to Take Some PO Safely    Begin to Take Some PO Safely- SLP, Date Established 10/12/17  -AC (r) AS (t) AC (c)      Begin to Take Some PO Safely- SLP, Time to Achieve by discharge  -AC (r) AS (t) AC (c)      Begin to Take Some PO Safely- SLP, Date Goal Reviewed 10/12/17  -AC (r) AS (t) AC (c)        User Key  (r) = Recorded By, (t) = Taken By, (c) = Cosigned By    Initials Name Provider Type    SG Lily Berman, MS CCC-SLP Speech and Language Pathologist    SM Melany Alas, MS CCC-SLP Speech and Language Pathologist    AC Sariah Starr, MS CCC-SLP Speech and Language Pathologist    LS Nannette Oviedo, MS CCC-SLP Speech and Language Pathologist    AS Estrella Houser, Speech Therapy Student Speech Therapy Student               Time Calculation:         Time Calculation- SLP       10/17/17 0900          Time Calculation- SLP    SLP Start Time 0820  -SG      SLP Received On 10/17/17  -SG        User Key  (r) = Recorded By, (t) = Taken By, (c) = Cosigned By    Initials Name Provider Type    SG Lily Berman, MS CCC-SLP Speech and Language Pathologist          Therapy Charges for Today     Code Description Service Date Service Provider Modifiers Qty    [id number] HC ST EVAL ORAL PHARYNG SWALLOW 3 10/17/2017 Lily Berman, MS CCC-SLP GN 1               Lily Berman MS CCC-SLP  10/17/2017

## 2017-10-17 NOTE — PLAN OF CARE
Problem: Pressure Ulcer Risk (Antwan Scale) (Adult,Obstetrics,Pediatric)  Goal: Identify Related Risk Factors and Signs and Symptoms  Outcome: Ongoing (interventions implemented as appropriate)  Goal: Skin Integrity  Outcome: Ongoing (interventions implemented as appropriate)    Problem: Acute Alcohol Withdrawal Syndrome, Risk For/Actual (Adult)  Goal: Signs and Symptoms of Listed Potential Problems Will be Absent or Manageable (Acute Alcohol Withdrawal Syndrome, Risk For/Actual)  Outcome: Ongoing (interventions implemented as appropriate)    Problem: Patient Care Overview (Adult)  Goal: Plan of Care Review  Outcome: Ongoing (interventions implemented as appropriate)    10/17/17 0401   Coping/Psychosocial Response Interventions   Plan Of Care Reviewed With patient   Patient Care Overview   Progress declining   Outcome Evaluation   Outcome Summary/Follow up Plan pt need for bipap. bipap placed, fms placed d/t every hour lactulose, pt lethargic, mostly unresponsive       Goal: Adult Individualization and Mutuality  Outcome: Ongoing (interventions implemented as appropriate)  Goal: Discharge Needs Assessment  Outcome: Ongoing (interventions implemented as appropriate)    Problem: Sepsis (Adult)  Goal: Signs and Symptoms of Listed Potential Problems Will be Absent or Manageable (Sepsis)  Outcome: Ongoing (interventions implemented as appropriate)    Problem: Respiratory Insufficiency (Adult)  Goal: Identify Related Risk Factors and Signs and Symptoms  Outcome: Ongoing (interventions implemented as appropriate)  Goal: Acid/Base Balance  Outcome: Ongoing (interventions implemented as appropriate)  Goal: Effective Ventilation  Outcome: Ongoing (interventions implemented as appropriate)    Problem: Wound, Traumatic, Nonburn (Adult)  Goal: Signs and Symptoms of Listed Potential Problems Will be Absent or Manageable (Wound, Traumatic, Nonburn)  Outcome: Ongoing (interventions implemented as appropriate)

## 2017-10-17 NOTE — PROGRESS NOTES
"      HOSPITALIST DAILY PROGRESS NOTE    Chief Complaint: FU AMS    Subjective   SUBJECTIVE/OVERNIGHT EVENTS   Lactulose q1hr PRN worked and pt had runny BMs, requiring a rectal tube. Pt still somnolent this AM. Tries to open his eyes to voice    Review of Systems:  Unable to obtain due to AMS    Objective   OBJECTIVE   I have reviewed the vital signs.  /70  Pulse 108  Temp 98.3 °F (36.8 °C) (Axillary)   Resp 22  Ht 64\" (162.6 cm)  Wt (!) 331 lb (150 kg)  SpO2 95%  BMI 56.82 kg/m2    Physical Exam:  General - NAD, pt was laying in bed on Hi-Flow comfortably  HEENT - EOMI, PERRL, oropharynx clear, hearing intact, dry mucous membrane  CVS - RRR, S1 S2, no rubs, gallops or murmurs, 2s cap refill, no peripheral edema, 2+ pulses  Resp - CTAB, +crackles and wheezes   GI - +BS, soft, ND, grimaces some with palpation of abdomen diffusely  MSK - No joint pain or joint edema  Neuro - Awake. Not oriented. Somnolent but wakes up to voice and tries to open his eyes    Results:  I have reviewed the labs, culture data, radiology results, and diagnostic studies.      Results from last 7 days  Lab Units 10/17/17  0400 10/16/17  0552 10/15/17  0532   WBC 10*3/mm3 13.52* 13.35* 13.03*   HEMOGLOBIN g/dL 12.5* 13.0* 13.8   HEMATOCRIT % 38.4* 38.8* 41.0   PLATELETS 10*3/mm3 108* 96* 87*       Results from last 7 days  Lab Units 10/17/17  0400   SODIUM mmol/L 147*   POTASSIUM mmol/L 3.7   CHLORIDE mmol/L 105   CO2 mmol/L 39.0*   BUN mg/dL 12   CREATININE mg/dL 0.60   GLUCOSE mg/dL 138*   CALCIUM mg/dL 8.3*       Culture Data:  Cultures:    Blood Culture   Date Value Ref Range Status   10/08/2017 No growth at 5 days  Final   10/08/2017 No growth at 5 days  Final     Urine Culture   Date Value Ref Range Status   10/08/2017 No growth at 2 days  Final   10/08/2017 70,000-80,000 CFU/mL Enterococcus faecalis (A)  Final       I have reviewed the medications.    bacitracin  Topical Q12H   famotidine 20 mg Oral BID   folic acid 1 mg " Oral Daily   multivitamin and minerals 15 mL Oral Daily   nystatin  Topical Q12H   PRO-STAT 1 packet Nasogastric BID   rifAXIMin 550 mg Oral Q12H   thiamine 100 mg Oral Daily       Assessment/Plan   ASSESSMENT/PLAN    Principal Problem:    Altered mental status  Active Problems:    Sepsis    Diastolic dysfunction    Hypoglycemia    H/O Alcoholic hepatitis with ascites    Elevated lactic acid level    Hyponatremia    Hepatic encephalopathy    Alcohol abuse    Mixed respiratory failure    ICU transfer. Pt is a 66yom with alcohol abuse presented on 10/8 with hepatic encephalopathy, transaminitis and coagulopathy (INR 1.8) and was admitted to ICU where his mental status improved some with BiPAP and lactulose. He required Precedex and levophed briefly. He was placed on scheduled valium for alcohol withdrawal and transferred to floor on Hi-Flow.      # Acute hypoxemic and hypercapnic respiratory failure: Currently on high flow 45L O2 sats 93%, BiPAP PRN. Suspect hepatopulmonary syndrome. CXR ordered and reviewed. No consolidation. Atelectasis with some increased pulmonary marking. Will obtain limited ECHO with bubble study for suspect hepatopulmonary syndrome. CXR does not appear bad enough for him to be requiring Hi-Flow to oxygenate . Respiratory status tenuous.    # Chronic hypernatremia: Serum osm pending but suspect hypovolemic hypernatremia. Adjusting D5W IV and free water through DHT for hypernatremia. Free water deficit 2L with goal Na 140. Goal correction <0.5meq/hr    # Hepatic encephalopathy: Ammonia 141 on admission. Continue rifaximin and lactulose PO and enemas to titrate to 2-3 BMs daily. Failed swallow eval 10/16. Place DHT. Unsure pt failed swallow eval due to somnolence from benzo accumulation given hepatic dysfunction from scheduled valium or if he was more encephalopathic due to having no BMs Sister decided against artificial nutrition. TFs stopped. Now having BMs but pt still very much encephalopathic  with persistent leukocytosis. No fevers however, given persistent AMS with elevated WBC with no other explanation, will empirically start Vanc and Zosyn    # Acute liver failure: INR>1.5, HE, transaminitis with no cirrhosis. Too far out to benefit from NAC  # E faecalis UTI: Completed ampicillin 10/16/17  # Alcohol withdrawal: Daily folate and thiamine. Scheduled valium DCed due to encephalopathy. Placed on CIWA ativan protocol  # Alcoholic hepatitis: Fatty liver on CT. Discriminant factor <32. Would not benefit from steriods    DNR/DNI. Vasopressors, abx, BiPAP okay  DVT PPx: Hold for thrombocytopenia  I expect patient to be discharged in:  ??    Complex patient. Spent >40min reviewing chart and managing his care. Spoke with sister (Caroline) late yesterday for an update. Repeat calls to both sister went to  today.    Karolina Garcia MD  10/17/17  11:07 AM

## 2017-10-17 NOTE — PROGRESS NOTES
Continued Stay Note  [facility]     Patient Name: Tod Ambrose  MRN: [id number]  Today's Date: 10/17/2017    Admit Date: 10/8/2017          Discharge Plan       10/17/17 1412    Case Management/Social Work Plan    Plan To be determined    Additional Comments I spoke with [facility] as a follow up with a referral that was given to them. She tells me  patient too medically complex at this time. She will see again when more appropriate for transitioning into skilled care.      I spoke with Dr. Garcia and she has consulted palliative services.               Discharge Codes     None        Expected Discharge Date and Time     Expected Discharge Date Expected Discharge Time    Oct 20, 2017             Lily Roblero RN

## 2017-10-17 NOTE — NURSING NOTE
Spoke with Caroline salinas sister, she stated that she wants to not proceed with tube feeding at this time. Called Dr. Garcia and made aware, ok to DC orders.- KENDALL Adan RN

## 2017-10-17 NOTE — PROGRESS NOTES
Adult Nutrition  Assessment/PES    Patient Name:  Tod Ambrose  YOB: 1950  MRN: [id number]  Admit Date:  10/8/2017    Assessment Date:  10/17/2017    Comments:            Reason for Assessment       10/17/17 1033    Reason for Assessment    Reason For Assessment/Visit TF/PN;physician consult    Time Spent (min) 45    Diagnosis --   SEE PREVIOUS NUTRITION NOTES    Hepatic Other (comment)   FATTY LIVER    Neurological Other (comment)   ETOH W/D      10/17/17 1022    Reason for Assessment    Reason For Assessment/Visit follow up protocol    Time Spent (min) 45    Diagnosis --   SEE PREVIOUS NUTRITION NOTES              Nutrition/Diet History       10/17/17 1023    Nutrition/Diet History    Functional Status/Food Prep Mobility --   MD PROVIDED V.O. VIA PHONE TO CHANGE TF PER RD ASSESSMENT    Reported/Observed By MD              Labs/Tests/Procedures/Meds       10/17/17 1035    Labs/Tests/Procedures/Meds    Labs/Tests Review Reviewed;NH3    Medication Review Reviewed, pertinent;Other (comment)   LACTULOSE, MVI & MINERALS, D5 150 ML/HR      10/17/17 1025    Labs/Tests/Procedures/Meds    Labs/Tests Review Reviewed            Physical Findings       10/17/17 1036    Physical Appearance    Gastrointestinal other (see comments)   FMS PLACED    Tubes other (see comments)   FEEDING TUBE IN DISTAL STOMACH BASED ON RADIOLOGY REPORT (10/17)    Oral/Mouth Cavity other (see comments)    Skin other (see comments)   B/L GROIN EXCORIATION & NOSE WOUND PER NURSING DOCUMENTATION            Estimated/Assessed Needs       10/17/17 1040    Estimated/Assessed Energy Needs    Estimated Kcal Range  7941-9544    Estimated/Assessed Protein Needs    Estimated Protein Range 118-148            Nutrition Prescription Ordered       10/17/17 1040    Nutrition Prescription PO    Current PO Diet NPO    Nutrition Prescription EN    Product Nutrihep    TF Delivery Method Continuous    Continuous TF Goal Rate (mL/hr) 20 mL/hr    Water  flush (mL)  50 mL    Water Flush Frequency Per hour              Problem/Interventions:        Problem 1       10/17/17 1041    Nutrition Diagnoses Problem 1    Problem 1 Needs Alternate Route    Etiology (related to) --   per clinical status, AMS    Signs/Symptoms (evidenced by) NPO            Problem 2       10/17/17 1041    Nutrition Diagnoses Problem 2    Problem 2 Inadequate Intake/Infusion    Inadequate Intake Type Oral    Etiology (related to) --   AMS/clinical condition    Signs/Symptoms (evidenced by) PO Intake;NPO;Other (comment)   TF NOT YET BEGUN                  Intervention Goal       10/17/17 1042    Intervention Goal    General Nutrition support treatment    TF/PN Adjust TF/PN            Nutrition Intervention       10/17/17 1042    Nutrition Intervention    RD/Tech Action Follow Tx progress;Care plan reviewd    Recommended/Ordered EN   NUTRAHEP FORMULA NOT INDICATED PRESENTLY            Nutrition Prescription       10/17/17 1043    Nutrition Prescription EN    Enteral Prescription Enteral begin/change    Enteral Route ND    Product Replete with Fiber    Modulars Liquid Protein (15 gm/30 mL)    Liquid Protein (15 gm/30 mL) 30 mL/1 packet    Protein Liquid Frequency 2 times a day    TF Delivery Method Continuous    Continuous TF Goal Rate (mL/hr) 70 mL/hr    Continuous TF Starting Rate (mL/hr) 25 mL/hr    Continuous TF Goal Volume (mL) 1540 mL    New EN Prescription Ordered? Yes    EN to Supply    Kcal/Day 1740 Kcal/Day   BASED ON 22 HR/DAY DELIVERY    Protein (gm/day) 126 gm/day   BASED ON 22 HR/DAY DELIVERY    TF Free H2O (mL) 1294 mL   BASED ON 22 HR/DAY DELIVERY    Fiber Per Day (gm/day) 22 gm/day   BASED ON 22 HR/DAY DELIVERY            Education/Evaluation       10/17/17 1048    Monitor/Evaluation    Monitor Per protocol;I&O;Pertinent labs;TF delivery/tolerance;Weight;Skin status;Symptoms        Electronically signed by:  Ana Hendrix, JAIDEN  10/17/17 10:48 AM

## 2017-10-17 NOTE — PLAN OF CARE
Problem: Patient Care Overview (Adult)  Goal: Plan of Care Review  Outcome: Ongoing (interventions implemented as appropriate)    10/17/17 0859   Coping/Psychosocial Response Interventions   Plan Of Care Reviewed With patient   Patient Care Overview   Progress (Evaluation)   Outcome Evaluation   Outcome Summary/Follow up Plan Clinical Dys Re-eval completed this date. Pt very lethargic with bloody secretions throughout oral cavity which SLP removed during oral care. Pt on HFNC for eval. Sats dropped to 79 during eval, but returned to 86 by end of eval. Immediate s/s w/ thins and NT even with small tsp trials. Wet, heavy coughing with suctioning required. Pt is not ready for instrumental eval. Rec: Initiate dys tx and check for instrumental eval readiness daily. SLP will complete instrumental evaluation when pt is clinically appropriate.          Problem: Inpatient SLP  Goal: Dysphagia- Patient will safely consume diet as per recommendation with no signs/symptoms of aspiration  Outcome: Ongoing (interventions implemented as appropriate)    10/13/17 1045 10/17/17 0859   Safely Consume Diet   Safely Consume Diet- SLP, Date Established 10/13/17 --    Safely Consume Diet- SLP, Time to Achieve by discharge --    Safely Consume Diet- SLP, Date Goal Reviewed --  10/17/17   Safely Consume Diet- SLP, Outcome --  goal ongoing

## 2017-10-17 NOTE — CONSULTS
No Known Provider  Consulting physician: Radha    Chief Complaint   Patient presents with   • Hypoglycemia         HPI: james is a 66-year-old male who is currently confused and unable to provide any type of history of present illness.  History of present illness comes primarily fromrecord review as well as talking other medical staff.  Apparently the patient was brought in hospital due to hypoglycemia and altered mental status.  Patient was initially admitted to the intensive care unit after being found septic as well as encephalopathic due to liver disease.  Patient also has a history of alcohol abuse and this was believed to be a bit creeping factor to patient's current decline.  The patient continued to have a very rukhsana course while in the hospital to the point where he is not been able to pass a swallowing study due to altered mental status and decreased mentation.  Patient has had an NG tube placed which is beenused to provide nutrition.  Nursing staff today states the patient will occasionally open his eyes possibly mumble incoherently but has mostly minimal interactions.      Past Medical History:   Diagnosis Date   • Alcoholic hepatitis with ascites    • COPD (chronic obstructive pulmonary disease)    • Diastolic dysfunction    • Polycythemia    • Thrombocytopenia      Past Surgical History:   Procedure Laterality Date   • PARACENTESIS       Current Facility-Administered Medications   Medication Dose Route Frequency Provider Last Rate Last Dose   • bacitracin 500 UNIT/GM ointment   Topical Q12H Vandana V. Case, DO       • dextrose (D5W) 5 % infusion  150 mL/hr Intravenous Continuous Karolina Garcia  mL/hr at 10/17/17 1025 150 mL/hr at 10/17/17 1025   • famotidine (PEPCID) tablet 20 mg  20 mg Oral BID Phuc Grigsby IV, [facility]   20 mg at 10/16/17 1832   • folic acid (FOLVITE) tablet 1 mg  1 mg Oral Daily Christiano Peterson MD   1 mg at 10/17/17 1025   • ipratropium-albuterol (DUO-NEB) nebulizer  solution 3 mL  3 mL Nebulization Q4H PRN KITTY Crane   3 mL at 10/11/17 2120   • lactulose (CHRONULAC) 10 GM/15ML solution 30 g  30 g Oral Q4H PRN Karolina Garcia MD       • LORazepam (ATIVAN) tablet 1 mg  1 mg Oral Q2H PRN Karolina Garcia MD        Or   • LORazepam (ATIVAN) injection 1 mg  1 mg Intravenous Q2H PRN Karolina Garcia MD   1 mg at 10/17/17 1404    Or   • LORazepam (ATIVAN) tablet 2 mg  2 mg Oral Q1H PRN Karolina Garcia MD        Or   • LORazepam (ATIVAN) injection 2 mg  2 mg Intravenous Q1H PRN Karolina Garcia MD        Or   • LORazepam (ATIVAN) injection 2 mg  2 mg Intravenous Q15 Min PRN Karolina Garcia MD        Or   • LORazepam (ATIVAN) injection 2 mg  2 mg Intramuscular Q15 Min PRN Karolina Garcia MD       • Magnesium Sulfate 2 gram Bolus, followed by 8 gram infusion (total Mg dose 10 grams)- Mg less than or equal to 1mg/dL  2 g Intravenous PRN Christiano Peterson MD        Or   • Magnesium Sulfate 6 gram Infusion (2 gm x 3) -Mg 1.1 -1.5 mg/dL  2 g Intravenous PRN Christiano Peterson MD        Or   • magnesium sulfate 4 gram infusion- Mg 1.6-1.9 mg/dL  4 g Intravenous PRN Christiano Peterson MD       • multivitamin and minerals liquid 15 mL  15 mL Oral Daily Christiano Peterson MD   15 mL at 10/17/17 1024   • nystatin (MYCOSTATIN) powder   Topical Q12H KITTY Casas       • ondansetron (ZOFRAN) injection 4 mg  4 mg Intravenous Q6H PRN KITTY Casas       • potassium & sodium phosphates (PHOS-NAK) 280-160-250 MG packet - for Phosphorus less than 1.25 mg/dL  2 packet Oral Q6H PRN Christiano Peterson MD        Or   • potassium & sodium phosphates (PHOS-NAK) 280-160-250 MG packet - for Phosphorus 1.25 - 2.1 mg/dL  2 packet Oral Once PRN Christiano Peterson MD   2 packet at 10/11/17 1077   • potassium chloride (KLOR-CON) packet 40 mEq  40 mEq Oral PRN Christiano Peterson MD   40 mEq at 10/11/17 0110   • potassium chloride (MICRO-K) CR capsule 40 mEq  40  "mEq Oral PRN Christiano Peterson MD       • potassium chloride 10 mEq in 100 mL IVPB  10 mEq Intravenous Q1H PRN Christiano Peterson  mL/hr at 10/10/17 0836 10 mEq at 10/10/17 0836   • rifaximin (XIFAXAN) tablet 550 mg  550 mg Oral Q12H Christiano Peterson MD   550 mg at 10/17/17 1025   • sodium chloride 0.9 % flush 1-10 mL  1-10 mL Intravenous PRN KITTY Casas       • sodium chloride 0.9 % flush 1-10 mL  1-10 mL Intracatheter PRN Christiano Peterson MD       • thiamine (VITAMIN B-1) tablet 100 mg  100 mg Oral Daily Christiano Peterson MD   100 mg at 10/17/17 1025       dextrose 150 mL/hr Last Rate: 150 mL/hr (10/17/17 1025)     ipratropium-albuterol  •  lactulose  •  LORazepam **OR** LORazepam **OR** LORazepam **OR** LORazepam **OR** LORazepam **OR** LORazepam  •  magnesium sulfate **OR** magnesium sulfate **OR** magnesium sulfate  •  ondansetron  •  potassium & sodium phosphates **OR** potassium & sodium phosphates  •  potassium chloride  •  potassium chloride  •  potassium chloride  •  sodium chloride  •  sodium chloride  No Known Allergies  History reviewed. No pertinent family history.  Social History     Social History   • Marital status: Single     Spouse name: N/A   • Number of children: N/A   • Years of education: N/A     Occupational History   • Not on file.     Social History Main Topics   • Smoking status: Never Smoker   • Smokeless tobacco: Not on file   • Alcohol use Yes      Comment: daily unknown amount   • Drug use: No   • Sexual activity: Not on file     Other Topics Concern   • Not on file     Social History Narrative     Review of Systems - nable to obtain secondary to altered mental status      /69  Pulse 108  Temp 98.4 °F (36.9 °C) (Oral)   Resp 20  Ht 64\" (162.6 cm)  Wt (!) 338 lb 4.8 oz (153 kg)  SpO2 94%  BMI 58.07 kg/m2    Intake/Output Summary (Last 24 hours) at 10/17/17 1459  Last data filed at 10/17/17 1100   Gross per 24 hour   Intake     "           60 ml   Output              970 ml   Net             -910 ml     Physical Exam:      General Appearance:    unresponsive   Head:    Normocephalic, without obvious abnormality, atraumatic   Eyes:            Lids and lashes normal, conjunctivae and sclerae normal, no   icterus, no pallor, corneas clear, PERRLA   Ears:    Ears appear intact with no abnormalities noted   Throat:   No oral lesions, no thrush, oral mucosa moist   Neck:   No adenopathy, supple, trachea midline, no thyromegaly, no   carotid bruit, no JVD   Back:     No kyphosis present, no scoliosis present, no skin lesions,      erythema or scars, no tenderness to percussion or                   palpation,   range of motion normal   Lungs:     Clear to auscultation,respirations regular, even and                  unlabored    Heart:    Regular rhythm and normal rate, normal S1 and S2, no            murmur, no gallop, no rub, no click   Chest Wall:    No abnormalities observed   Abdomen:     Normal bowel sounds, no masses, no organomegaly, soft        non-tender, non-distended, no guarding, no rebound                tenderness   Rectal:     Deferred   Extremities:   jose noted both lower extremities   Pulses:   Pulses palpable and equal bilaterally   Skin:   No bleeding, bruising or rash   Lymph nodes:   No palpable adenopathy             Results from last 7 days  Lab Units 10/17/17  0400   WBC 10*3/mm3 13.52*   HEMOGLOBIN g/dL 12.5*   HEMATOCRIT % 38.4*   PLATELETS 10*3/mm3 108*       Results from last 7 days  Lab Units 10/17/17  1213 10/17/17  0400   SODIUM mmol/L 144 147*   POTASSIUM mmol/L 3.4* 3.7   CHLORIDE mmol/L 104 105   CO2 mmol/L 38.0* 39.0*   BUN mg/dL 11 12   CREATININE mg/dL 0.40* 0.60   CALCIUM mg/dL 8.4* 8.3*   BILIRUBIN mg/dL  --  2.8*   ALK PHOS U/L  --  271*   ALT (SGPT) U/L  --  80*   AST (SGOT) U/L  --  219*   GLUCOSE mg/dL 141* 138*       Results from last 7 days  Lab Units 10/17/17  1213   SODIUM mmol/L 144   POTASSIUM mmol/L  3.4*   CHLORIDE mmol/L 104   CO2 mmol/L 38.0*   BUN mg/dL 11   CREATININE mg/dL 0.40*   GLUCOSE mg/dL 141*   CALCIUM mg/dL 8.4*     Imaging Results (last 72 hours)     Procedure Component Value Units Date/Time    XR Abdomen KUB [[id number]] Collected:  10/16/17 1700     Updated:  10/16/17 1954    Narrative:       EXAM:  XR Abdomen, 1 View    CLINICAL HISTORY:  66 years old, male; Sepsis, unspecified organism; Acidosis; Dysphagia,   unspecified; Other reduced mobility; Device placement; Gi device; Other:   Keofeed placement    TECHNIQUE:  Frontal supine view of the abdomen/pelvis.    COMPARISON:  CR - XR ABDOMEN KUB 10/12/2017 3:07:18 AM    FINDINGS:  An enteric tube is present with tip in the distal stomach.  Evaluation of the   cardiopulmonary and abdominal structures is limited, as this study is optimized   for evaluation of the enteric tube position. Calcified granuloma within the   right lower lobe.      Impression:       Enteric tube tip within the distal stomach.    THIS DOCUMENT HAS BEEN ELECTRONICALLY SIGNED BY SHANNON ARREOLA MD    XR Chest 1 View [[id number]] Collected:  10/17/17 0922     Updated:  10/17/17 1201    Narrative:       EXAMINATION: XR CHEST 1 VW-      INDICATION: Hypoxemia; A41.9-Sepsis, unspecified organism;  E87.2-Acidosis; Z74.09-Other reduced mobility; R13.10-Dysphagia,  unspecified.      FINDINGS:   1. There is cardiomegaly with volume reduction at the bases. Hilar and  perihilar vascular congestive edema is noted both upper mid and lower  lung zones.  2. Right PICC line is well positioned the mid SVC.           Impression:       Compared to previous studies of 4 days ago, there is a  slight progression of volume loss at the lung bases. The vascular  congestion in the upper mid and lower lung zones has also slightly  increased. The mild bibasilar airspace opacities are otherwise stable  and no other acute findings are noted.     D:  10/17/2017  E:  10/17/2017     This report was finalized on  10/17/2017 11:59 AM by Dr. Tod Soriano MD.           Impression: epatic encephalopathy  Change in mental status  Sepsis  Alcohol abuse  Cirrhosis  Goals of care  Plan: I did have a brief conversation with the patient's nurse.  It does appear that the family is out of town but is on their way in.  We will plan on meeting with the family tomorrow as they're wanting to discuss hospice.  The patient will be inpatient hospice candidateif we stop tube feeds.  Again I will plan on discussing this with the family tomorrow when they arrive.    Continue current medication.      Wesley Loja DO  10/17/17  2:59 PM

## 2017-10-17 NOTE — SIGNIFICANT NOTE
RN called to report refractory heavy liquid diarrhea with lactulose in very obese and difficult to clean pt. Ordered fecal management system for now. NH3 normal range 10/15, will recheck in AM.    Requests TF reordered and states we do not stock. Defer this to rounding MD/dietician to readdress.

## 2017-10-17 NOTE — PLAN OF CARE
Problem: Patient Care Overview (Adult)  Goal: Plan of Care Review  Outcome: Ongoing (interventions implemented as appropriate)    10/17/17 0930   Coping/Psychosocial Response Interventions   Plan Of Care Reviewed With patient   Patient Care Overview   Progress improving   Outcome Evaluation   Outcome Summary/Follow up Plan WOC nurse visited pt to evaluate status of nose wound, gluteal and abdominal skin. Nose wound is gradually improving. Plan to continue Bacitracin ointment, then cover with NG securement device. Abdominal pannus and gluteal region: will continue crust with Nystatin powder and barrier spray BID and with soiling. Use Inter-dry left abdominal pannus to help with moisture control. Off load heels with pillows. Pt is on JOSE RAUL bed. WOC nurse will f/u. Please contact WOC nurse with concerns.          Problem: Wound, Traumatic, Nonburn (Adult)  Goal: Signs and Symptoms of Listed Potential Problems Will be Absent or Manageable (Wound, Traumatic, Nonburn)  Outcome: Ongoing (interventions implemented as appropriate)    10/17/17 0930   Wound, Traumatic, Nonburn   Problems Assessed (Wound) all   Problems Present (Wound) skin breakdown

## 2017-10-17 NOTE — PROGRESS NOTES
Adult Nutrition  Assessment/PES    Patient Name:  Tod Ambrose  YOB: 1950  MRN: [id number]  Admit Date:  10/8/2017    Assessment Date:  10/17/2017    Comments:            Reason for Assessment       10/17/17 1307    Reason for Assessment    Reason For Assessment/Visit other (comment)   PER PHONE ORDER FROM DR. STEPHEN, WILL ADD 60 ML/HR H2O TO CURRENT TF RX      10/17/17 1033    Reason for Assessment    Reason For Assessment/Visit TF/PN;physician consult    Time Spent (min) 45    Diagnosis --   SEE PREVIOUS NUTRITION NOTES    Hepatic Other (comment)   FATTY LIVER    Neurological Other (comment)   ETOH W/D      10/17/17 1022    Reason for Assessment    Reason For Assessment/Visit follow up protocol    Time Spent (min) 45    Diagnosis --   SEE PREVIOUS NUTRITION NOTES              Nutrition/Diet History       10/17/17 1023    Nutrition/Diet History    Functional Status/Food Prep Mobility --   MD PROVIDED V.O. VIA PHONE TO CHANGE TF PER RD ASSESSMENT    Reported/Observed By MD              Labs/Tests/Procedures/Meds       10/17/17 1035    Labs/Tests/Procedures/Meds    Labs/Tests Review Reviewed;NH3    Medication Review Reviewed, pertinent;Other (comment)   LACTULOSE, MVI & MINERALS, D5 150 ML/HR      10/17/17 1025    Labs/Tests/Procedures/Meds    Labs/Tests Review Reviewed            Physical Findings       10/17/17 1036    Physical Appearance    Gastrointestinal other (see comments)   FMS PLACED    Tubes other (see comments)   FEEDING TUBE IN DISTAL STOMACH BASED ON RADIOLOGY REPORT (10/17)    Oral/Mouth Cavity other (see comments)    Skin other (see comments)   B/L GROIN EXCORIATION & NOSE WOUND PER NURSING DOCUMENTATION            Estimated/Assessed Needs       10/17/17 1040    Estimated/Assessed Energy Needs    Estimated Kcal Range  8208-3154    Estimated/Assessed Protein Needs    Estimated Protein Range 118-148            Nutrition Prescription Ordered       10/17/17 1040    Nutrition Prescription  PO    Current PO Diet NPO    Nutrition Prescription EN    Product Nutrihep    TF Delivery Method Continuous    Continuous TF Goal Rate (mL/hr) 20 mL/hr    Water flush (mL)  50 mL    Water Flush Frequency Per hour              Problem/Interventions:        Problem 1       10/17/17 1041    Nutrition Diagnoses Problem 1    Problem 1 Needs Alternate Route    Etiology (related to) --   per clinical status, AMS    Signs/Symptoms (evidenced by) NPO            Problem 2       10/17/17 1041    Nutrition Diagnoses Problem 2    Problem 2 Inadequate Intake/Infusion    Inadequate Intake Type Oral    Etiology (related to) --   AMS/clinical condition    Signs/Symptoms (evidenced by) PO Intake;NPO;Other (comment)   TF NOT YET BEGUN                  Intervention Goal       10/17/17 1042    Intervention Goal    General Nutrition support treatment    TF/PN Adjust TF/PN            Nutrition Intervention       10/17/17 1042    Nutrition Intervention    RD/Tech Action Follow Tx progress;Care plan reviewd    Recommended/Ordered EN   NUTRAHEP FORMULA NOT INDICATED PRESENTLY            Nutrition Prescription       10/17/17 1043    Nutrition Prescription EN    Enteral Prescription Enteral begin/change    Enteral Route ND    Product Replete with Fiber    Modulars Liquid Protein (15 gm/30 mL)    Liquid Protein (15 gm/30 mL) 30 mL/1 packet    Protein Liquid Frequency 2 times a day    TF Delivery Method Continuous    Continuous TF Goal Rate (mL/hr) 70 mL/hr    Continuous TF Starting Rate (mL/hr) 25 mL/hr    Continuous TF Goal Volume (mL) 1540 mL    New EN Prescription Ordered? Yes    EN to Supply    Kcal/Day 1740 Kcal/Day   BASED ON 22 HR/DAY DELIVERY    Protein (gm/day) 126 gm/day   BASED ON 22 HR/DAY DELIVERY    TF Free H2O (mL) 1294 mL   BASED ON 22 HR/DAY DELIVERY    Fiber Per Day (gm/day) 22 gm/day   BASED ON 22 HR/DAY DELIVERY            Education/Evaluation       10/17/17 1048    Monitor/Evaluation    Monitor Per protocol;I&O;Pertinent  labs;TF delivery/tolerance;Weight;Skin status;Symptoms        Electronically signed by:  Ana Hendrix RD  10/17/17 1:08 PM

## 2017-10-17 NOTE — PROGRESS NOTES
Pharmacy Consult-Vancomycin Dosing  Tod Ambrose is a  66 y.o. male receiving vancomycin therapy.     Indication: intra-abdominal infection/sepsis  Consulting Provider: Dr. Garcia  ID Consult: no    Goal Trough: 15-20    Current Antimicrobial Therapy  IV Anti-Infectives       Ordered     Dose/Rate Route Frequency Start Stop      10/17/17 1753  vancomycin 1250 mg/250 mL 0.9% NS IVPB ([facility])     Ordering Provider:  Karolina Garcia MD    1,250 mg  over 90 Minutes Intravenous Every 12 Hours 10/18/17 0800      10/17/17 1728  piperacillin-tazobactam (ZOSYN) 3.375 g in iso-osmotic dextrose 50 ml (premix)     Ordering Provider:  Karolina Garcia MD    3.375 g  over 4 Hours Intravenous Every 8 Hours 10/18/17 0000      10/17/17 1741  vancomycin 2000 mg/500 mL 0.9% NS IVPB ([facility])     Ordering Provider:  Karolina Garcia MD    2,000 mg  over 120 Minutes Intravenous Once 10/17/17 1815      10/17/17 1728  piperacillin-tazobactam (ZOSYN) 3.375 g in iso-osmotic dextrose 50 ml (premix)     Ordering Provider:  Karolina Garcia MD    3.375 g  over 30 Minutes Intravenous Once 10/17/17 1800      10/17/17 1728  Pharmacy to dose vancomycin     Ordering Provider:  Karolina Garcia MD     Does not apply Continuous PRN 10/17/17 1725      10/10/17 1225  ampicillin 1 g/100 mL 0.9% NS ([facility])     Emeli James [facility] reviewed the order on 10/12/17 0935.   Ordering Provider:  Christiano Peterson MD    1 g  over 30 Minutes Intravenous Every 6 Hours 10/10/17 1400 10/16/17 2040    10/09/17 1025  rifaximin (XIFAXAN) tablet 550 mg     Ordering Provider:  Christiano Peterson MD    550 mg Oral Every 12 Hours Scheduled 10/09/17 1200      10/08/17 1135  vancomycin IVPB 2000 mg in 0.9% Sodium Chloride (premix) 500 mL     Ordering Provider:  Ramiro Ruiz MD    2,000 mg Intravenous Once 10/08/17 1137 10/08/17 1238    10/08/17 1135  piperacillin-tazobactam (ZOSYN) 4.5 g/100 mL 0.9% NS IVPB ([facility])     Ordering Provider:  Ramiro Ruiz MD    4.5 g Intravenous Once  "10/08/17 1137 10/08/17 1707            Allergies  Allergies as of 10/08/2017   • (No Known Allergies)       Labs      Results from last 7 days     Lab Units 10/17/17  1213 10/17/17  0400 10/16/17  2333   BUN mg/dL 11 12 14   CREATININE mg/dL 0.40* 0.60 0.60         Results from last 7 days     Lab Units 10/17/17  0400 10/16/17  0552 10/15/17  0532   WBC 10*3/mm3 13.52* 13.35* 13.03*       @[facility]@    Evaluation of Dosing         Ht - 64\" (162.6 cm)  Wt - (!) 338 lb 4.8 oz (153 kg)    Estimated Creatinine Clearance: 125.9 mL/min (by C-G formula based on Cr of 0.4).    Intake & Output (last 7 days)         10/11 0701 - 10/12 0700 10/12 0701 - 10/13 0700 10/13 0701 - 10/14 0700 10/14 0701 - 10/15 0700 10/15 0701 - 10/16 0700 10/16 0701 - 10/17 0700 10/17 0701 - 10/18 0700      P.O.     120 100 351    I.V. (mL/kg) 410.7 (3.6) 1431 (12.6) 1876 (16.5)   1683 (11.2) 1200 (7.8)    Other 771     60     NG/GT 1141          IV Piggyback 399.9 199.6 100        Total Intake(mL/kg) 2722.6 (24) 1630.6 (14.4) 1976 (17.4)  120 (0.8) 1843 (12.3) 1551 (10.1)    Urine (mL/kg/hr) 1035 (0.4) 800 (0.3) 670 (0.2) 920 (0.3) 825 (0.2) 800 (0.2) 520 (0.3)    Stool 1300 (0.5) 500 (0.2)  0 (0)  0 (0)     Total Output 2335 1300 670 920 825 800 520    Net +387.6 +330.6 +1306 -920 -705 +1043 +1031               Unmeasured Stool Occurrence    1 x  1 x             Microbiology and Radiology  Microbiology Results (last 10 days)       Procedure Component Value - Date/Time      Urine Culture - Urine, Urine, Clean Catch [[id number]]  (Normal) Collected:  10/08/17 1531    Lab Status:  Final result Specimen:  Urine from Urine, Catheter Updated:  10/10/17 0746     Urine Culture No growth at 2 days    Blood Culture - Blood, [[id number]]  (Normal) Collected:  10/08/17 1149    Lab Status:  Final result Specimen:  Blood from Arm, Left Updated:  10/13/17 1201     Blood Culture No growth at 5 days    Blood Culture - Blood, [[id number]]  (Normal) Collected:  " 10/08/17 1149    Lab Status:  Final result Specimen:  Blood from Arm, Right Updated:  10/13/17 1201     Blood Culture No growth at 5 days    Urine Culture - Urine, Urine, Clean Catch [[id number]]  (Abnormal)  (Susceptibility) Collected:  10/08/17 1054    Lab Status:  Final result Specimen:  Urine from Urine, Catheter Updated:  10/10/17 1207     Urine Culture --      70,000-80,000 CFU/mL Enterococcus faecalis (A)    Susceptibility        Enterococcus faecalis     CARMEN     Ampicillin <=2 ug/ml Susceptible     Gentamicin High Level Synergy <=500 ug/ml Susceptible     Levofloxacin <=1 ug/ml Susceptible     Linezolid <=1 ug/ml Susceptible     Nitrofurantoin <=32 ug/ml Susceptible     Penicillin G 2 ug/ml Susceptible     Streptomycin High Level Synergy <=1000 ug/ml Susceptible     Tetracycline >8 ug/ml Resistant     Vancomycin 1 ug/ml Susceptible                              Evaluation of Level    No results found for: ZACKARY NEFF, MARQUISE    Assessment/Plan:  Will give onetime dose of vancomycin 2000 mg(13 mg/kg)  this evening and then start vancomycin 1250 mg(8 mg/kg) iv q12h starting 10/18; will check vancomycin trough prior to am dose on 10/19  Ha Waters [facility]

## 2017-10-18 NOTE — PLAN OF CARE
Problem: Pressure Ulcer Risk (Antwan Scale) (Adult,Obstetrics,Pediatric)  Goal: Identify Related Risk Factors and Signs and Symptoms  Outcome: Ongoing (interventions implemented as appropriate)  Goal: Skin Integrity  Outcome: Ongoing (interventions implemented as appropriate)    Problem: Acute Alcohol Withdrawal Syndrome, Risk For/Actual (Adult)  Goal: Signs and Symptoms of Listed Potential Problems Will be Absent or Manageable (Acute Alcohol Withdrawal Syndrome, Risk For/Actual)  Outcome: Ongoing (interventions implemented as appropriate)    Problem: Patient Care Overview (Adult)  Goal: Plan of Care Review  Outcome: Ongoing (interventions implemented as appropriate)    10/18/17 0415   Coping/Psychosocial Response Interventions   Plan Of Care Reviewed With patient   Patient Care Overview   Progress declining   Outcome Evaluation   Outcome Summary/Follow up Plan pt made full comfort measures, hospice in AM , pt presents more comfortable with robiul and pain meds        Goal: Adult Individualization and Mutuality  Outcome: Ongoing (interventions implemented as appropriate)  Goal: Discharge Needs Assessment  Outcome: Ongoing (interventions implemented as appropriate)    Problem: Sepsis (Adult)  Goal: Signs and Symptoms of Listed Potential Problems Will be Absent or Manageable (Sepsis)  Outcome: Ongoing (interventions implemented as appropriate)    Problem: Respiratory Insufficiency (Adult)  Goal: Identify Related Risk Factors and Signs and Symptoms  Outcome: Ongoing (interventions implemented as appropriate)  Goal: Acid/Base Balance  Outcome: Ongoing (interventions implemented as appropriate)  Goal: Effective Ventilation  Outcome: Ongoing (interventions implemented as appropriate)    Problem: Wound, Traumatic, Nonburn (Adult)  Goal: Signs and Symptoms of Listed Potential Problems Will be Absent or Manageable (Wound, Traumatic, Nonburn)  Outcome: Ongoing (interventions implemented as appropriate)

## 2017-10-18 NOTE — SIGNIFICANT NOTE
Exam confirms by auscultation no audible heart tones, no breath sounds. Death pronounced on 10/18/2017 at 0640.    Johny Barron RN/[facility]

## 2017-10-18 NOTE — SIGNIFICANT NOTE
Sister whom works in PACU currently at bedside and is current caregiver and next of kin.  Sister is requesting to have care withdrawn including no antibiotics, no keofeed tube, no flushes through keofeed, requesting complete comfort care only.  Second sister ( who is only other next of kin)  is in complete agreement with current sister at bedside to make full decisions.  Hospice consult is requested along with keeping patient completely comfortable. Code Status noted/changed.  RN instructed to notify palliative care.  Hospice consult placed in orders.  Plan of care discussed with RN, sister, and primary care team.

## 2017-10-20 NOTE — DISCHARGE SUMMARY
[facility]  DISCHARGE SUMMARY    Patient Name: Tod Ambrose  : 1950  MRN: [id number]    Date of Admission: 10/8/2017  Date of Discharge:  10/18/2017  Length of Stay: 10  Primary Care Physician: No Known Provider    Consults     No orders found from 2017 to 10/9/2017.          Hospital Course     Presenting Problem:   Sepsis, due to unspecified organism [A41.9]      Active Hospital Problems (** Indicates Principal Problem)    Diagnosis Date Noted   • **Altered mental status [R41.82] 10/08/2017   • Alcohol abuse [F10.10] 10/09/2017   • Mixed respiratory failure [J96.21, J96.22] 10/09/2017   • Sepsis [A41.9] 10/08/2017   • Diastolic dysfunction [I51.9] 10/08/2017   • Hypoglycemia [E16.2] 10/08/2017   • H/O Alcoholic hepatitis with ascites [K70.11] 10/08/2017   • Elevated lactic acid level [R79.89] 10/08/2017   • Hyponatremia [E87.1] 10/08/2017   • Hepatic encephalopathy [K72.90] 10/08/2017      Resolved Hospital Problems    Diagnosis Date Noted Date Resolved   No resolved problems to display.          Hospital Course:  Tod Ambrose is a 66 y.o. male with a history of alcohol abuse presented on 10/8 with acute liver failure- hepatic encephalopathy, transaminitis and coagulopathy (INR 1.8) and was admitted to ICU. Pt's acute hypoxemic and hypercapnic respiratory failure improved on BiPAP and was transitioned on Hi-Flow. Hepatopulmonary syndrome was suspected given no overt evidence for pulmonary infectious etiology or volume overload. Pt's encephalopathy improved initially after treatment of his UTI however it was complicated by severe alcohol withdrawal requiring scheduled valium. Pt remained encephalopathic despite treatment for HE and discontinuation of benzos. Palliative was consulted, family updated on patient's condition and a decision was made to withdraw care. Pt was made comfort care. Pt  shortly after.            Day of Discharge     HPI:   N/A. Pt  .    Review of Systems  N/A. Pt     Vital Signs:       Physical Exam:  N/A. Pt     Pertinent  and/or Most Recent Results         Results from last 7 days  Lab Units 10/18/17  0424 10/17/17  1213 10/17/17  0400 10/16/17  2333 10/16/17  1747 10/16/17  1228 10/16/17  0552 10/15/17  0532   WBC 10*3/mm3 23.74*  --  13.52*  --   --   --  13.35* 13.03*   HEMOGLOBIN g/dL 13.0*  --  12.5*  --   --   --  13.0* 13.8   HEMATOCRIT % 40.1  --  38.4*  --   --   --  38.8* 41.0   PLATELETS 10*3/mm3 159  --  108*  --   --   --  96* 87*   SODIUM mmol/L 141 144 147* 149* 147* 148* 150*  150* 145   POTASSIUM mmol/L 4.4 3.4* 3.7 3.6 4.1 3.9 4.0  4.0 4.1   CHLORIDE mmol/L 97* 104 105 108 108 107 104  104 103   CO2 mmol/L 39.0* 38.0* 39.0* 37.0* 38.0* 37.0* 39.0*  39.0* 29.0   BUN mg/dL 12 11 12 14 14 14 14  14 13   CREATININE mg/dL 0.70 0.40* 0.60 0.60 0.60 0.50* 0.60  0.60 0.60   GLUCOSE mg/dL 135* 141* 138* 149* 144* 145* 107*  107* 109*   CALCIUM mg/dL 8.8 8.4* 8.3* 8.7 8.9 8.7 8.7  8.7 8.2*       Results from last 7 days  Lab Units 10/18/17  0424 10/17/17  0400 10/16/17  0552 10/16/17  0551 10/15/17  0532   BILIRUBIN mg/dL 3.0* 2.8* 3.0*  3.0*  --  3.5*   ALK PHOS U/L 292* 271* 254*  254*  --  249*   ALT (SGPT) U/L 91* 80* 68*  68*  --  60*   AST (SGOT) U/L 228* 219* 202*  202*  --  186*   PROTIME Seconds 14.1*  --   --  14.0*  --    INR  1.28  --   --  1.27  --       Results from last 7 days  Lab Units 10/16/17  0552   CHOLESTEROL mg/dL 81   TRIGLYCERIDES mg/dL 110         Brief Urine Lab Results  (Last result in the past 365 days)      Color   Clarity   Blood   Leuk Est   Nitrite   Protein   CREAT   Urine HCG        10/17/17 1123             167.4                  Imaging Results (all)     Procedure Component Value Units Date/Time    XR Chest 1 View [[id number]] Collected:  10/08/17 1346     Updated:  10/08/17 1400    Narrative:       EXAMINATION: XR CHEST 1 VW - 10/08/2017     INDICATION: Cough.       COMPARISON: 4/19/2016.     FINDINGS: Portable chest reveals the heart to be enlarged. Mild  elevation identified of the right hemidiaphragm. Lung fields are grossly  clear. Degenerative change is seen within the spine. Vascular  calcification seen within the thoracic aorta. No definite pleural  effusion or pneumothorax. Pulmonary vascularity is within normal limits.            Impression:       No acute cardiopulmonary disease.     DICTATED:     10/08/2017  EDITED:         10/08/2017        This report was finalized on 10/8/2017 1:58 PM by Dr. Mary Harden MD.       XR Chest 1 View [[id number]] Collected:  10/08/17 1836     Updated:  10/09/17 0909    Narrative:       EXAMINATION: XR CHEST, SINGLE VIEW - 10/08/2017     INDICATION: A41.9-Sepsis, unspecified organism; E87.2-Acidosis.      COMPARISON: 10/08/2017.     FINDINGS: Portable chest reveals the heart to be enlarged. Patchy  ill-defined opacification left lung base. Degenerative change is seen  within the spine. The right lung is clear. Increased pulmonary  vascularity bilaterally. Vascular calcification seen within the thoracic  aorta.           Impression:       Ill-defined opacification seen in the left lung base. The  heart is enlarged. Prominence of the pulmonary vascularity. Nasogastric  tube below the level of the diaphragm.     DICTATED:     10/08/2017  EDITED:         10/08/2017           This report was finalized on 10/9/2017 9:07 AM by Dr. Mary Harden MD.       CT Head Without Contrast [[id number]] Collected:  10/08/17 1410     Updated:  10/09/17 1053    Narrative:       EXAMINATION: CT HEAD WO CONTRAST date      INDICATION: Altered mental status, unresponsive.     TECHNIQUE: Multiple axial CT imaging was obtained of the head from skull  base to skull vertex without the administration of intravenous contrast.     The radiation dose reduction device was turned on for each scan per the  ALARA (As Low as Reasonably Achievable) protocol.      COMPARISON: None.     FINDINGS: Low-density area seen scattered throughout the periventricular  and subcortical white matter suggesting chronic small vessel ischemic  change. There is no hemorrhage or hydrocephalus. No mass, mass effect,  or midline shift. Question of some low-density area seen within the  leftward aspect of the mario suggesting possibly an ischemic insult.  Findings may also represent artifact at the skull base. Clinical  correlation is needed.       Impression:       Low-density area seen within the periventricular and  subcortical white matter suggesting chronic small vessel ischemic  change. Also a questionable low-density area seen along the leftward  aspect of the mario for which an ischemic insult cannot be excluded.  Clinical correlation is needed.     DICTATED:     10/08/2017  EDITED:         10/08/2017        This report was finalized on 10/9/2017 10:51 AM by Dr. Mary Harden MD.       CT Abdomen Pelvis Without Contrast [[id number]] Collected:  10/08/17 1424     Updated:  10/09/17 1053    Narrative:       EXAMINATION: CT ABDOMEN PELVIS WO CONTRAST - 10/08/2017     INDICATION:  A41.9-Sepsis, unspecified organism; E87.2-Acidosis.  Elevated lactic acid.     TECHNIQUE: Multiple axial CT imaging was obtained of the abdomen and  pelvis from the lung bases to the pubic symphysis without the  administration of oral or intravenous contrast.     The radiation dose reduction device was turned on for each scan per the  ALARA (As Low as Reasonably Achievable) protocol.     COMPARISON: 11/04/2014.     FINDINGS: The lung bases are grossly clear with some atelectatic changes  identified at the left lung base. Small area of pleural thickening  identified at the left lung base. There is fatty infiltration identified  of the liver. Multiple stones identified within the gallbladder. There  is some abnormal stranding seen surrounding the pancreas. Some free  fluid identified in the paracolic gutters  bilaterally as well as within  the peripancreatic region. Question of some wall thickening identified  of the duodenum. Findings concerning for inflammatory change within the  duodenum. Correlation to lab values is recommended. An acute  pancreatitis cannot be excluded. Again inflammatory changes of the  duodenum are of concern. Minimal wall thickening identified of the  second portion of the duodenum. The remainder of the gastrointestinal  tract is within normal limits. There is a ventral abdominal wall hernia  containing mesenteric fat and bowel. No evidence of strangulation  however the hernia sac is not completely imaged on the examination.  There is some stranding identified within the left flank and fluid in  the left paracolic gutter.     Kidneys and adrenal glands reveal cyst seen on the left kidney. Both  adrenal glands are within normal limits. The spleen is unremarkable.     PELVIS: The pelvic organs are unremarkable. The pelvic portion of the  gastrointestinal tract within normal limits. No free fluid or free air.  No pelvic adenopathy. No abnormal mass or fluid collections identified.  Degenerative change is seen within the spine and pelvis.       Impression:       1. Stranding identified within the mesenteric root with peripancreatic  stranding identified. There are also inflammatory changes and wall  thickening of the second and third portion of the duodenum. Correlation  to clinical lab values is recommended. Stones are seen in the  gallbladder. A small amount of fluid along the left paracolic gutter.  2. Ventral abdominal wall hernia containing bowel and fat. No definite  strangulation.     DICTATED:     10/08/2017  EDITED:         10/08/2017        This report was finalized on 10/9/2017 10:51 AM by Dr. Mary Harden MD.       XR Abdomen KUB [[id number]] Collected:  10/08/17 1834     Updated:  10/09/17 1053    Narrative:       EXAMINATION: XR ABDOMEN KUB - 10/08/2017     INDICATION:  A41.9-Sepsis, unspecified organism; E87.2-Acidosis.     COMPARISON: None.     FINDINGS: KUB reveals nasogastric tube identified in the stomach. Mild  increased markings identified at the left lung base. Degenerative change  is seen within the spine. Bowel gas pattern is nonspecific.           Impression:       Nasogastric tube identified, tip in the stomach.     DICTATED:     10/08/2017  EDITED:         10/08/2017           This report was finalized on 10/9/2017 10:51 AM by Dr. Mary Harden MD.       XR Chest 1 View [[id number]] Collected:  10/11/17 1127     Updated:  10/11/17 2150    Narrative:       EXAMINATION: XR CHEST 1 VW-      INDICATION: Respiratory distress; A41.9-Sepsis, unspecified organism;  E87.2-Acidosis.      COMPARISON: 10/08/2017.     FINDINGS: Right upper extremity PICC line and NG tube appear to be in  good position. The heart is enlarged. The vasculature is slightly  cephalized. There may be minimal new right basilar atelectasis, but  patchy opacity in the left base has cleared.       Impression:       1. Cardiomegaly and mild pulmonary venous hypertension.  2. Interval clearing of the left base. Mild new right basilar discoid  atelectasis.     D:  10/11/2017  E:  10/11/2017     This report was finalized on 10/11/2017 9:48 PM by DR. William Rajput MD.       XR Abdomen KUB [[id number]]  (Abnormal) Collected:  10/12/17 0301     Updated:  10/12/17 0523    Narrative:       EXAM:  XR Abdomen, 1 View    CLINICAL HISTORY:  66 years old, male; Sepsis, unspecified organism; Acidosis; Device placement;   Gi device; Nasogastric tube; Additional info: Ng placement for feeding    TECHNIQUE:  Frontal supine view of the abdomen/pelvis.    COMPARISON:  CR - XR ABDOMEN KUB 10/8/2017 5:14:38 PM    FINDINGS:  Gastrointestinal tract:  Normal.  No dilation.  Bones/joints:  Normal.  Tubes, lines and devices:  Enteric tube tip is in the gastric body lumen.    Central venous catheter in the lower superior vena cava.       Impression:         1.  Enteric tube tip is in the gastric body lumen.    2.  Incidental/non-acute findings are described above.    THIS DOCUMENT HAS BEEN ELECTRONICALLY SIGNED BY SHANNON ARREOLA MD    XR Chest 1 View [[id number]] Collected:  10/13/17 0900     Updated:  10/13/17 2131    Narrative:       EXAMINATION: XR CHEST 1 VW- 10/13/2017     INDICATION: A41.9-Sepsis, unspecified organism; E87.2-Acidosis;  Z74.09-Other reduced mobility; R13.10-Dysphagia, unspecified      COMPARISON: 10/11/2017     FINDINGS: PICC line tip is seen in the distal SVC. The heart is  enlarged. The vasculature is slightly cephalized. There is mild hazy  opacity right base increased from the prior study. Lungs elsewhere now  appear practically clear.           Impression:       Mildly increased right basilar atelectasis.     D:  10/13/2017  E:  10/13/2017     This report was finalized on 10/13/2017 9:29 PM by DR. William Rajput MD.       XR Abdomen KUB [[id number]] Collected:  10/16/17 1700     Updated:  10/16/17 1954    Narrative:       EXAM:  XR Abdomen, 1 View    CLINICAL HISTORY:  66 years old, male; Sepsis, unspecified organism; Acidosis; Dysphagia,   unspecified; Other reduced mobility; Device placement; Gi device; Other:   Keofeed placement    TECHNIQUE:  Frontal supine view of the abdomen/pelvis.    COMPARISON:  CR - XR ABDOMEN KUB 10/12/2017 3:07:18 AM    FINDINGS:  An enteric tube is present with tip in the distal stomach.  Evaluation of the   cardiopulmonary and abdominal structures is limited, as this study is optimized   for evaluation of the enteric tube position. Calcified granuloma within the   right lower lobe.      Impression:       Enteric tube tip within the distal stomach.    THIS DOCUMENT HAS BEEN ELECTRONICALLY SIGNED BY SHANNON ARREOLA MD    XR Chest 1 View [[id number]] Collected:  10/17/17 0922     Updated:  10/17/17 1201    Narrative:       EXAMINATION: XR CHEST 1 VW-      INDICATION: Hypoxemia; A41.9-Sepsis, unspecified  organism;  E87.2-Acidosis; Z74.09-Other reduced mobility; R13.10-Dysphagia,  unspecified.      FINDINGS:   1. There is cardiomegaly with volume reduction at the bases. Hilar and  perihilar vascular congestive edema is noted both upper mid and lower  lung zones.  2. Right PICC line is well positioned the mid SVC.           Impression:       Compared to previous studies of 4 days ago, there is a  slight progression of volume loss at the lung bases. The vascular  congestion in the upper mid and lower lung zones has also slightly  increased. The mild bibasilar airspace opacities are otherwise stable  and no other acute findings are noted.     D:  10/17/2017  E:  10/17/2017     This report was finalized on 10/17/2017 11:59 AM by Dr. Tod Soriano MD.             Results for orders placed during the hospital encounter of 10/08/17   Adult Transthoracic Echo Limited W/ Cont if Necessary Per Protocol    Narrative · Left ventricular wall thickness is consistent with mild concentric   hypertrophy.  · Left ventricular systolic function is hyperdynamic (EF > 70).  · lumason contrast shows hyperdynamic LV function  · the patient has underlying tachycardia  · saline study technically difficult and uninterpretable              Discharge Details     There are no discharge medications for this patient.         Discharge Disposition:        No future appointments.          Time Spent on Discharge:  35min    Karolina Garcia MD  10/20/17  10:52 AM